# Patient Record
Sex: FEMALE | Race: WHITE | NOT HISPANIC OR LATINO | Employment: FULL TIME | URBAN - METROPOLITAN AREA
[De-identification: names, ages, dates, MRNs, and addresses within clinical notes are randomized per-mention and may not be internally consistent; named-entity substitution may affect disease eponyms.]

---

## 2019-07-08 ENCOUNTER — HOSPITAL ENCOUNTER (OUTPATIENT)
Dept: URGENT CARE | Facility: CLINIC | Age: 39
Discharge: HOME | End: 2019-07-08

## 2021-09-07 ENCOUNTER — LAB (OUTPATIENT)
Dept: LAB | Facility: HOSPITAL | Age: 41
End: 2021-09-07

## 2021-09-07 ENCOUNTER — TRANSCRIBE ORDERS (OUTPATIENT)
Dept: LAB | Facility: HOSPITAL | Age: 41
End: 2021-09-07

## 2021-09-07 DIAGNOSIS — Z00.00 ROUTINE GENERAL MEDICAL EXAMINATION AT A HEALTH CARE FACILITY: Primary | ICD-10-CM

## 2021-09-07 DIAGNOSIS — Z00.00 ROUTINE GENERAL MEDICAL EXAMINATION AT A HEALTH CARE FACILITY: ICD-10-CM

## 2021-09-07 PROCEDURE — U0004 COV-19 TEST NON-CDC HGH THRU: HCPCS

## 2021-09-07 PROCEDURE — C9803 HOPD COVID-19 SPEC COLLECT: HCPCS

## 2021-09-08 LAB — SARS-COV-2 RNA NOSE QL NAA+PROBE: DETECTED

## 2021-09-08 NOTE — PROGRESS NOTES
Called patient, verified name and date of birth, positive COVID results provided, discussed CDC guidelines, the Health Department will be contacting him/her with further instructions, patient verbalized understanding.She has been testing positive since 8/25 and 8/26. She has been congested for a few weeks. She believes she has been sick for over a few weeks when her mother tested positive from 8/20.

## 2022-09-01 ENCOUNTER — APPOINTMENT (OUTPATIENT)
Dept: ULTRASOUND IMAGING | Facility: HOSPITAL | Age: 42
End: 2022-09-01

## 2022-09-01 ENCOUNTER — HOSPITAL ENCOUNTER (EMERGENCY)
Facility: HOSPITAL | Age: 42
Discharge: HOME | End: 2022-09-01
Attending: EMERGENCY MEDICINE | Admitting: EMERGENCY MEDICINE

## 2022-09-01 VITALS
DIASTOLIC BLOOD PRESSURE: 86 MMHG | HEIGHT: 62 IN | WEIGHT: 128.53 LBS | HEART RATE: 91 BPM | BODY MASS INDEX: 23.65 KG/M2 | RESPIRATION RATE: 20 BRPM | OXYGEN SATURATION: 99 % | TEMPERATURE: 98.4 F | SYSTOLIC BLOOD PRESSURE: 138 MMHG

## 2022-09-01 DIAGNOSIS — R10.30 LOWER ABDOMINAL PAIN: Primary | ICD-10-CM

## 2022-09-01 DIAGNOSIS — D25.9 UTERINE LEIOMYOMA, UNSPECIFIED LOCATION: ICD-10-CM

## 2022-09-01 LAB
ALBUMIN SERPL-MCNC: 4.7 G/DL (ref 3.5–5.2)
ALBUMIN/GLOB SERPL: 2.1 G/DL
ALP SERPL-CCNC: 46 U/L (ref 39–117)
ALT SERPL W P-5'-P-CCNC: 14 U/L (ref 1–33)
ANION GAP SERPL CALCULATED.3IONS-SCNC: 12.6 MMOL/L (ref 5–15)
AST SERPL-CCNC: 16 U/L (ref 1–32)
BASOPHILS # BLD AUTO: 0.03 10*3/MM3 (ref 0–0.2)
BASOPHILS NFR BLD AUTO: 0.4 % (ref 0–1.5)
BILIRUB SERPL-MCNC: 0.5 MG/DL (ref 0–1.2)
BUN SERPL-MCNC: 10 MG/DL (ref 6–20)
BUN/CREAT SERPL: 15.9 (ref 7–25)
CALCIUM SPEC-SCNC: 9.1 MG/DL (ref 8.6–10.5)
CHLORIDE SERPL-SCNC: 104 MMOL/L (ref 98–107)
CO2 SERPL-SCNC: 23.4 MMOL/L (ref 22–29)
CREAT SERPL-MCNC: 0.63 MG/DL (ref 0.57–1)
DEPRECATED RDW RBC AUTO: 46.8 FL (ref 37–54)
EGFRCR SERPLBLD CKD-EPI 2021: 115.2 ML/MIN/1.73
EOSINOPHIL # BLD AUTO: 0.21 10*3/MM3 (ref 0–0.4)
EOSINOPHIL NFR BLD AUTO: 3.1 % (ref 0.3–6.2)
ERYTHROCYTE [DISTWIDTH] IN BLOOD BY AUTOMATED COUNT: 13.1 % (ref 12.3–15.4)
GLOBULIN UR ELPH-MCNC: 2.2 GM/DL
GLUCOSE SERPL-MCNC: 120 MG/DL (ref 65–99)
HCG INTACT+B SERPL-ACNC: <0.5 MIU/ML
HCT VFR BLD AUTO: 39.5 % (ref 34–46.6)
HGB BLD-MCNC: 13.8 G/DL (ref 12–15.9)
HOLD SPECIMEN: NORMAL
HOLD SPECIMEN: NORMAL
IMM GRANULOCYTES # BLD AUTO: 0.02 10*3/MM3 (ref 0–0.05)
IMM GRANULOCYTES NFR BLD AUTO: 0.3 % (ref 0–0.5)
LIPASE SERPL-CCNC: 45 U/L (ref 13–60)
LYMPHOCYTES # BLD AUTO: 2.34 10*3/MM3 (ref 0.7–3.1)
LYMPHOCYTES NFR BLD AUTO: 34.2 % (ref 19.6–45.3)
MCH RBC QN AUTO: 33.8 PG (ref 26.6–33)
MCHC RBC AUTO-ENTMCNC: 34.9 G/DL (ref 31.5–35.7)
MCV RBC AUTO: 96.8 FL (ref 79–97)
MONOCYTES # BLD AUTO: 0.42 10*3/MM3 (ref 0.1–0.9)
MONOCYTES NFR BLD AUTO: 6.1 % (ref 5–12)
NEUTROPHILS NFR BLD AUTO: 3.82 10*3/MM3 (ref 1.7–7)
NEUTROPHILS NFR BLD AUTO: 55.9 % (ref 42.7–76)
NRBC BLD AUTO-RTO: 0 /100 WBC (ref 0–0.2)
PLATELET # BLD AUTO: 183 10*3/MM3 (ref 140–450)
PMV BLD AUTO: 9.2 FL (ref 6–12)
POTASSIUM SERPL-SCNC: 3.6 MMOL/L (ref 3.5–5.2)
PROT SERPL-MCNC: 6.9 G/DL (ref 6–8.5)
RBC # BLD AUTO: 4.08 10*6/MM3 (ref 3.77–5.28)
SODIUM SERPL-SCNC: 140 MMOL/L (ref 136–145)
WBC NRBC COR # BLD: 6.84 10*3/MM3 (ref 3.4–10.8)
WHOLE BLOOD HOLD COAG: NORMAL
WHOLE BLOOD HOLD SPECIMEN: NORMAL

## 2022-09-01 PROCEDURE — 99282 EMERGENCY DEPT VISIT SF MDM: CPT

## 2022-09-01 PROCEDURE — 85025 COMPLETE CBC W/AUTO DIFF WBC: CPT | Performed by: EMERGENCY MEDICINE

## 2022-09-01 PROCEDURE — 83690 ASSAY OF LIPASE: CPT | Performed by: EMERGENCY MEDICINE

## 2022-09-01 PROCEDURE — 76830 TRANSVAGINAL US NON-OB: CPT

## 2022-09-01 PROCEDURE — 36415 COLL VENOUS BLD VENIPUNCTURE: CPT | Performed by: EMERGENCY MEDICINE

## 2022-09-01 PROCEDURE — 99283 EMERGENCY DEPT VISIT LOW MDM: CPT

## 2022-09-01 PROCEDURE — 84702 CHORIONIC GONADOTROPIN TEST: CPT | Performed by: EMERGENCY MEDICINE

## 2022-09-01 PROCEDURE — 80053 COMPREHEN METABOLIC PANEL: CPT | Performed by: EMERGENCY MEDICINE

## 2022-09-01 RX ORDER — SODIUM CHLORIDE 0.9 % (FLUSH) 0.9 %
10 SYRINGE (ML) INJECTION AS NEEDED
Status: DISCONTINUED | OUTPATIENT
Start: 2022-09-01 | End: 2022-09-01 | Stop reason: HOSPADM

## 2022-09-01 NOTE — ED PROVIDER NOTES
Time: 4:23 PM EDT  Arrived by: private car  Chief Complaint: lower abdomen/pelvic pain  History provided by: patient  History is limited by: N/A     History of Present Illness:  Patient is a 42 y.o. year old female who presents to the emergency department with lower abdominal/suprapubic pain x 3 days. She reports pain in the same area over the past few months with irregular periods. She feels like there is something going on with her female anatomy. Denies vaginal discharge or concerns for STD's. She reports negative pregnancy tests and one faint positive test recently. She reports in May she felt like she has a miscarriage because she passed a large clot.           History provided by:  Patient  Abdominal Pain  Pain location:  Suprapubic  Pain quality: sharp    Pain radiates to:  Does not radiate  Pain severity:  Moderate  Onset quality:  Sudden  Duration:  3 days  Timing:  Constant  Progression:  Worsening  Chronicity:  Recurrent  Context: not alcohol use, not awakening from sleep, not diet changes, not eating, not laxative use, not medication withdrawal, not previous surgeries, not recent illness, not recent sexual activity, not recent travel, not retching, not sick contacts, not suspicious food intake and not trauma    Relieved by:  Nothing  Worsened by:  Nothing  Ineffective treatments:  None tried  Associated symptoms: no anorexia, no belching, no chest pain, no chills, no constipation, no cough, no diarrhea, no dysuria, no fatigue, no fever, no flatus, no hematemesis, no hematochezia, no hematuria, no melena, no nausea, no shortness of breath, no sore throat, no vaginal bleeding, no vaginal discharge and no vomiting        Similar Symptoms Previously: yes  Recently seen: no      Patient Care Team  Primary Care Provider: Provider, No Known    Past Medical History:     No Known Allergies  No past medical history on file.  No past surgical history on file.  No family history on file.    Home Medications:  Prior  "to Admission medications    Medication Sig Start Date End Date Taking? Authorizing Provider   diclofenac (VOLTAREN) 50 MG EC tablet Take 1 tablet by mouth 3 (Three) Times a Day. 3/17/22   Sonu Frye PA        Social History:   Social History     Tobacco Use   • Smoking status: Current Every Day Smoker     Packs/day: 0.50   • Smokeless tobacco: Never Used   Vaping Use   • Vaping Use: Some days   Substance Use Topics   • Alcohol use: Yes     Alcohol/week: 1.0 standard drink     Types: 1 Shots of liquor per week     Comment: daily whiskey     Recent travel: no     Review of Systems:  Review of Systems   Constitutional: Negative for chills, fatigue and fever.   HENT: Negative for congestion, ear pain and sore throat.    Eyes: Negative for pain.   Respiratory: Negative for cough, chest tightness and shortness of breath.    Cardiovascular: Negative for chest pain.   Gastrointestinal: Positive for abdominal pain. Negative for anorexia, constipation, diarrhea, flatus, hematemesis, hematochezia, melena, nausea and vomiting.   Genitourinary: Positive for pelvic pain. Negative for dysuria, flank pain, hematuria, vaginal bleeding and vaginal discharge.   Musculoskeletal: Negative for joint swelling.   Skin: Negative for pallor.   Neurological: Negative for seizures and headaches.   All other systems reviewed and are negative.       Physical Exam:  /86 (BP Location: Right arm, Patient Position: Sitting)   Pulse 91   Temp 98.4 °F (36.9 °C) (Oral)   Resp 20   Ht 157.5 cm (62\")   Wt 58.3 kg (128 lb 8.5 oz)   LMP 06/12/2022 Comment: 2 periods in June  SpO2 99%   BMI 23.51 kg/m²     Physical Exam  Vitals and nursing note reviewed.   Constitutional:       General: She is not in acute distress.     Appearance: Normal appearance. She is not toxic-appearing.   HENT:      Head: Normocephalic and atraumatic.      Mouth/Throat:      Mouth: Mucous membranes are moist.   Eyes:      General: No scleral " icterus.  Cardiovascular:      Rate and Rhythm: Normal rate and regular rhythm.      Pulses: Normal pulses.      Heart sounds: Normal heart sounds.   Pulmonary:      Effort: Pulmonary effort is normal. No respiratory distress.      Breath sounds: Normal breath sounds.   Abdominal:      General: Abdomen is flat.      Palpations: Abdomen is soft.      Tenderness: There is no abdominal tenderness.   Musculoskeletal:         General: Normal range of motion.      Cervical back: Normal range of motion and neck supple.   Skin:     General: Skin is warm and dry.   Neurological:      Mental Status: She is alert and oriented to person, place, and time. Mental status is at baseline.                Medications in the Emergency Department:  Medications - No data to display     Labs  Lab Results (last 24 hours)     Procedure Component Value Units Date/Time    CBC & Differential [[id number]]  (Abnormal) Collected: 09/01/22 1634    Specimen: Blood Updated: 09/01/22 1643    Narrative:      The following orders were created for panel order CBC & Differential.  Procedure                               Abnormality         Status                     ---------                               -----------         ------                     CBC Auto Differential[[id number]]        Abnormal            Final result                 Please view results for these tests on the individual orders.    Comprehensive Metabolic Panel [[id number]]  (Abnormal) Collected: 09/01/22 1634    Specimen: Blood Updated: 09/01/22 1709     Glucose 120 mg/dL      BUN 10 mg/dL      Creatinine 0.63 mg/dL      Sodium 140 mmol/L      Potassium 3.6 mmol/L      Chloride 104 mmol/L      CO2 23.4 mmol/L      Calcium 9.1 mg/dL      Total Protein 6.9 g/dL      Albumin 4.70 g/dL      ALT (SGPT) 14 U/L      AST (SGOT) 16 U/L      Alkaline Phosphatase 46 U/L      Total Bilirubin 0.5 mg/dL      Globulin 2.2 gm/dL      A/G Ratio 2.1 g/dL      BUN/Creatinine Ratio 15.9     Anion Gap  12.6 mmol/L      eGFR 115.2 mL/min/1.73      Comment: National Kidney Foundation and American Society of Nephrology (ASN) Task Force recommended calculation based on the Chronic Kidney Disease Epidemiology Collaboration (CKD-EPI) equation refit without adjustment for race.       Narrative:      GFR Normal >60  Chronic Kidney Disease <60  Kidney Failure <15      Lipase [[id number]]  (Normal) Collected: 09/01/22 1634    Specimen: Blood Updated: 09/01/22 1709     Lipase 45 U/L     hCG, Quantitative, Pregnancy [[id number]] Collected: 09/01/22 1634    Specimen: Blood Updated: 09/01/22 1706     HCG Quantitative <0.50 mIU/mL     Narrative:      HCG Ranges by Gestational Age    Females - non-pregnant premenopausal   </= 1mIU/mL HCG  Females - postmenopausal               </= 7mIU/mL HCG    3 Weeks       5.4   -      72 mIU/mL  4 Weeks      10.2   -     708 mIU/mL  5 Weeks       217   -   8,245 mIU/mL  6 Weeks       152   -  32,177 mIU/mL  7 Weeks     4,059   - 153,767 mIU/mL  8 Weeks    31,366   - 149,094 mIU/mL  9 Weeks    59,109   - 135,901 mIU/mL  10 Weeks   44,186   - 170,409 mIU/mL  12 Weeks   27,107   - 201,615 mIU/mL  14 Weeks   24,302   -  93,646 mIU/mL  15 Weeks   12,540   -  69,747 mIU/mL  16 Weeks    8,904   -  55,332 mIU/mL  17 Weeks    8,240   -  51,793 mIU/mL  18 Weeks    9,649   -  55,271 mIU/mL    Results may be falsely decreased if patient taking Biotin.      CBC Auto Differential [[id number]]  (Abnormal) Collected: 09/01/22 1634    Specimen: Blood Updated: 09/01/22 1643     WBC 6.84 10*3/mm3      RBC 4.08 10*6/mm3      Hemoglobin 13.8 g/dL      Hematocrit 39.5 %      MCV 96.8 fL      MCH 33.8 pg      MCHC 34.9 g/dL      RDW 13.1 %      RDW-SD 46.8 fl      MPV 9.2 fL      Platelets 183 10*3/mm3      Neutrophil % 55.9 %      Lymphocyte % 34.2 %      Monocyte % 6.1 %      Eosinophil % 3.1 %      Basophil % 0.4 %      Immature Grans % 0.3 %      Neutrophils, Absolute 3.82 10*3/mm3      Lymphocytes, Absolute 2.34  10*3/mm3      Monocytes, Absolute 0.42 10*3/mm3      Eosinophils, Absolute 0.21 10*3/mm3      Basophils, Absolute 0.03 10*3/mm3      Immature Grans, Absolute 0.02 10*3/mm3      nRBC 0.0 /100 WBC            Imaging:  US Non-ob Transvaginal    Result Date: 9/1/2022  PROCEDURE: US NON-OB TRANSVAGINAL  COMPARISON: None  INDICATIONS: lower abd pain/pelic pain  TECHNIQUE: Ultrasound examination of the pelvis was performed, using endovaginal technique.   FINDINGS:  The visualized uterus appears markedly heterogeneous.  The uterus is estimated to measure 13.5 cm x 6.0 cm x 7.7 cm.  Foci of heterogeneity within the uterus measure up to 4.3 cm.  The endometrial stripe is not demonstrated on the provided images.  The ovaries are not visualized.        1. Diffuse enlargement and marked heterogeneity of the uterus suspected to be secondary to multiple fibroids.  There is associated shadowing by the lesions.  Other superimposed pathology is not excluded.  Comparison with any previous pelvic ultrasound would be useful.  Follow-up pelvic MRI on a nonemergent basis may be useful to further evaluate. 2. Nonvisualization of the ovaries.     Isai Zelaya M.D.       Electronically Signed and Approved By: Isai Zelaya M.D. on 9/01/2022 at 18:54               Procedures:  Procedures    Progress  ED Course as of 09/01/22 2228   Thu Sep 01, 2022   1623   --- PROVIDER IN TRIAGE NOTE ---    Patient was evaluated in triage by Nathaniel shafer.  Orders were written and the patient is currently awaiting disposition.  [CM]      ED Course User Index  [CM] Trace Bernal APRN                            The patient was initially evaluated in the triage area where orders were placed. The patient was later dispositioned by HEAVENLY Rodriguez.      Medical Decision Making:  MDM  Number of Diagnoses or Management Options  Lower abdominal pain: new and requires workup  Uterine leiomyoma, unspecified location: new and requires  workup  Diagnosis management comments: The patient is resting comfortably and feels better, is alert and in no distress. Repeat examination is unremarkable and benign; in particular, there's no discomfort at McBurney's point and there is no pulsatile mass. The history, exam, diagnostic testing, and current condition does not suggest acute appendicitis, bowel obstruction, acute cholecystitis, bowel perforation, major gastrointestinal bleeding, severe diverticulitis, abdominal aortic aneurysm, mesenteric ischemia, volvulus, sepsis, or other significant pathology that warrants further testing, continued ED treatment, admission, for surgical evaluation at this point. The vital signs have been stable. The patient does not have uncontrollable pain, intractable vomiting, or other significant symptoms. The patient's condition is stable and appropriate for discharge from the emergency department.       Amount and/or Complexity of Data Reviewed  Clinical lab tests: reviewed  Tests in the radiology section of CPT®: reviewed    Risk of Complications, Morbidity, and/or Mortality  Presenting problems: moderate  Diagnostic procedures: low  Management options: low    Patient Progress  Patient progress: stable       Final diagnoses:   Lower abdominal pain   Uterine leiomyoma, unspecified location        Disposition:  ED Disposition     ED Disposition   Discharge    Condition   Stable    Comment   --             This medical record created using voice recognition software.           Trace Bernal, APRN  09/01/22 5909

## 2022-09-02 NOTE — DISCHARGE INSTRUCTIONS
Follow up with your primary provider or with an OB/GYN for evaluation and to have someone order pelvic MRI as suggested by radiologist tonight based on your ultrasound result. Return to the ER for worsening pain or any concerns.

## 2023-12-13 ENCOUNTER — OFFICE VISIT (OUTPATIENT)
Dept: OBSTETRICS AND GYNECOLOGY | Facility: CLINIC | Age: 43
End: 2023-12-13
Payer: COMMERCIAL

## 2023-12-13 ENCOUNTER — PATIENT ROUNDING (BHMG ONLY) (OUTPATIENT)
Dept: OBSTETRICS AND GYNECOLOGY | Facility: CLINIC | Age: 43
End: 2023-12-13
Payer: COMMERCIAL

## 2023-12-13 VITALS
HEART RATE: 84 BPM | HEIGHT: 62 IN | SYSTOLIC BLOOD PRESSURE: 132 MMHG | BODY MASS INDEX: 29.19 KG/M2 | DIASTOLIC BLOOD PRESSURE: 92 MMHG | WEIGHT: 158.6 LBS

## 2023-12-13 DIAGNOSIS — N91.2 AMENORRHEA: Primary | ICD-10-CM

## 2023-12-13 DIAGNOSIS — R93.89 THICKENED ENDOMETRIUM: ICD-10-CM

## 2023-12-13 DIAGNOSIS — N85.2 BULKY OR ENLARGED UTERUS: ICD-10-CM

## 2023-12-13 LAB
HCG INTACT+B SERPL-ACNC: <1 MIU/ML
PROLACTIN SERPL-MCNC: 20.1 NG/ML (ref 4.79–23.3)
T4 FREE SERPL-MCNC: 1.02 NG/DL (ref 0.93–1.7)
TSH SERPL DL<=0.05 MIU/L-ACNC: 3.69 UIU/ML (ref 0.27–4.2)

## 2023-12-13 PROCEDURE — 84439 ASSAY OF FREE THYROXINE: CPT | Performed by: OBSTETRICS & GYNECOLOGY

## 2023-12-13 PROCEDURE — 84702 CHORIONIC GONADOTROPIN TEST: CPT | Performed by: OBSTETRICS & GYNECOLOGY

## 2023-12-13 PROCEDURE — 84146 ASSAY OF PROLACTIN: CPT | Performed by: OBSTETRICS & GYNECOLOGY

## 2023-12-13 PROCEDURE — 84443 ASSAY THYROID STIM HORMONE: CPT | Performed by: OBSTETRICS & GYNECOLOGY

## 2023-12-13 RX ORDER — PRENATAL VIT NO.126/IRON/FOLIC 28MG-0.8MG
TABLET ORAL DAILY
COMMUNITY

## 2023-12-13 NOTE — PATIENT INSTRUCTIONS
Venipuncture Blood Specimen Collection  Venipuncture performed in right arm by Mary Garibay with good hemostasis. Patient tolerated the procedure well without complications.   12/13/23   Mary Garibay

## 2023-12-13 NOTE — PROGRESS NOTES
"GYN Visit    Chief Complaint   Patient presents with    Follow-up     FUUS w/ findings of enlarged uterus and right ovarian mass.       HPI:   43 y.o. LMP: Patient's last menstrual period was 2023 (within weeks). Presents for referral for enlarged, fibroid uterus and amenorrhea. Patient states she has not had a period since May. She thought she was pregnant and states she had a faint pregnancy test but the lab draws have all been negative. She had an US in November that showed a thickened endometrium with a possible right complex adnexal mass but no measurements were done. She did have an US in 2022 which also showed a thickened area of the endometrium and an enlarged uterus. She was counseled that these are most likely fibroids that have continued to enlarge. She states she has gained weight and feels bloated. She was counseled that due to the amenorrhea I recommend TSH, prolactin, b-hcg and to repeat the US with a possible EMB. She denies any complaints today and states she is up to date with her pap smear from the health department. We will obtain records.       History: PMHx, Meds, Allergies, PSHx, Social Hx, and POBHx all reviewed and updated.    PHYSICAL EXAM:  /92   Pulse 84   Ht 157.5 cm (62\")   Wt 71.9 kg (158 lb 9.6 oz)   LMP 2023 (Within Weeks) Comment: Pt has not had a period since May  BMI 29.01 kg/m²   General- NAD, alert and oriented, appropriate  Psych- Normal mood, good memory  Neck- No masses, no thyroid enlargement  Cardiovascular- Regular rhythm, no murnurs  Respiratory- CTA to bases, no wheezes  Abdomen- Soft, non distended, non tender, no masses  Pelvic exam: defer to US and EMB visit     ASSESSMENT AND PLAN:  Diagnoses and all orders for this visit:    1. Amenorrhea (Primary)  -     US Non-ob Transvaginal; Future  -     TSH; Future  -     T4, free; Future  -     Prolactin; Future  -     hCG, Quantitative, Pregnancy  -     TSH  -     T4, free  -     " Prolactin    2. Thickened endometrium    3. Bulky or enlarged uterus          Follow Up:  Return in about 2 weeks (around 12/27/2023) for TVUS and EMB.    I spent 20 minutes caring for Frances on this date of service. This time includes time spent by me in the following activities:preparing for the visit, reviewing tests, obtaining and/or reviewing a separately obtained history, performing a medically appropriate examination and/or evaluation , counseling and educating the patient/family/caregiver, ordering medications, tests, or procedures, and documenting information in the medical record    Afshan Magana DO  12/13/2023    [facility]  [address] [place] [address]  Dept: [phone]  Dept Fax: [phone]  Loc: [phone]  Loc Fax: [phone]

## 2023-12-20 ENCOUNTER — OFFICE VISIT (OUTPATIENT)
Dept: INTERNAL MEDICINE | Facility: CLINIC | Age: 43
End: 2023-12-20
Payer: COMMERCIAL

## 2023-12-20 VITALS
SYSTOLIC BLOOD PRESSURE: 130 MMHG | BODY MASS INDEX: 29.59 KG/M2 | OXYGEN SATURATION: 98 % | TEMPERATURE: 97.7 F | HEART RATE: 79 BPM | DIASTOLIC BLOOD PRESSURE: 78 MMHG | WEIGHT: 161.8 LBS

## 2023-12-20 DIAGNOSIS — F17.210 CIGARETTE NICOTINE DEPENDENCE WITHOUT COMPLICATION: ICD-10-CM

## 2023-12-20 DIAGNOSIS — R03.0 ELEVATED BLOOD PRESSURE READING WITHOUT DIAGNOSIS OF HYPERTENSION: ICD-10-CM

## 2023-12-20 DIAGNOSIS — Z12.31 ENCOUNTER FOR SCREENING MAMMOGRAM FOR BREAST CANCER: ICD-10-CM

## 2023-12-20 DIAGNOSIS — N92.6 IRREGULAR PERIODS: ICD-10-CM

## 2023-12-20 DIAGNOSIS — Z00.00 ANNUAL PHYSICAL EXAM: Primary | ICD-10-CM

## 2023-12-20 NOTE — ASSESSMENT & PLAN NOTE
Discussed bp elevation at today's visit. Discussed cutting down/quitting mt dew intake and nicotine use. Not high enough for medication at this time. Discussed risks of blood pressure elevation including death, heart attack, stroke, kidney disease, blindness. Low salt diet, increase exercise. We will monitor at follow up and discuss blood pressure medications if necessary. To er if chest pain, palpitations, vision loss, unilateral weakness, altered mental status. Pt understands and agrees with plan.

## 2023-12-20 NOTE — ASSESSMENT & PLAN NOTE
Reviewed preventative medication recommendations that are age appropriate for the patient. Education provided for health and wellness. Encouraged healthy diet, regular exercise, and routine wellness checkups.  Declines vaccines.

## 2023-12-20 NOTE — ASSESSMENT & PLAN NOTE
Discussed risks of smoking with patient including death, blood pressure elevation, heart attack, stroke, kidney disese, blindness, slow wound healing. Pt is not ready to quit smoking at this time, encouraged to RTC once ready to quit.

## 2023-12-20 NOTE — PROGRESS NOTES
Chief Complaint  Establish Care (Possible pregnancy. Thinks it might be menopause.  /)    Subjective          Frances Rao presents to [facility]  History of Present Illness  Last PCP: [facility]  Previous Specialists: GYN   Last labs: 12/13/23  Last mammogram: never, family history of breast cancer in great grandparents  Last pap: at health dept in 11/2023  Last colonoscopy: never,  no family history of colon cancer     Irregular periods:   No period since 5/2023.   Has seen gyn for this. Labs 12/13/23  She had period which started 12/18/23.     BP elevation: it has been high in the past  Denies chest pain, palpitations, dizziness   Pt drinks 5 cans of Mt Dew /day   Smoking use       Past Medical History:   Diagnosis Date    Abnormal Pap smear of cervix     Fibroid     Ovarian cyst         History reviewed. No pertinent surgical history.     Current Outpatient Medications on File Prior to Visit   Medication Sig Dispense Refill    prenatal vitamin (prenatal, CLASSIC, vitamin) tablet Take  by mouth Daily.       No current facility-administered medications on file prior to visit.        No Known Allergies    Social History     Tobacco Use   Smoking Status Every Day    Packs/day: 0.50    Years: 22.00    Additional pack years: 0.00    Total pack years: 11.00    Types: Cigarettes   Smokeless Tobacco Never   Tobacco Comments    Trying to stop          Objective   Vital Signs:   /78   Pulse 79   Temp 97.7 °F (36.5 °C) (Temporal)   Wt 73.4 kg (161 lb 12.8 oz)   SpO2 98%   BMI 29.59 kg/m²     Physical Exam  Vitals reviewed.   Constitutional:       Appearance: Normal appearance.   HENT:      Head: Normocephalic and atraumatic.      Nose: Nose normal.      Mouth/Throat:      Mouth: Mucous membranes are moist.   Eyes:      Extraocular Movements: Extraocular movements intact.      Conjunctiva/sclera: Conjunctivae normal.      Pupils: Pupils are  equal, round, and reactive to light.   Cardiovascular:      Rate and Rhythm: Normal rate and regular rhythm.   Pulmonary:      Effort: Pulmonary effort is normal.      Breath sounds: Normal breath sounds.   Abdominal:      General: Abdomen is flat. Bowel sounds are normal.      Palpations: Abdomen is soft.   Musculoskeletal:         General: Normal range of motion.   Neurological:      General: No focal deficit present.      Mental Status: She is alert and oriented to person, place, and time.   Psychiatric:         Mood and Affect: Mood normal.        Result Review :   The following data was reviewed by: Maria Guadalupe Francisco PA-C on 12/20/2023:    Data reviewed : Radiologic studies TVUS                    Assessment and Plan    Diagnoses and all orders for this visit:    1. Annual physical exam (Primary)  Assessment & Plan:  Reviewed preventative medication recommendations that are age appropriate for the patient. Education provided for health and wellness. Encouraged healthy diet, regular exercise, and routine wellness checkups.  Declines vaccines.      2. Elevated blood pressure reading without diagnosis of hypertension  Assessment & Plan:  Discussed bp elevation at today's visit. Discussed cutting down/quitting mt dew intake and nicotine use. Not high enough for medication at this time. Discussed risks of blood pressure elevation including death, heart attack, stroke, kidney disease, blindness. Low salt diet, increase exercise. We will monitor at follow up and discuss blood pressure medications if necessary. To er if chest pain, palpitations, vision loss, unilateral weakness, altered mental status. Pt understands and agrees with plan.        3. Cigarette nicotine dependence without complication  Assessment & Plan:  Discussed risks of smoking with patient including death, blood pressure elevation, heart attack, stroke, kidney disese, blindness, slow wound healing. Pt is not ready to quit smoking at this time,  encouraged to RTC once ready to quit.      4. Encounter for screening mammogram for breast cancer  -     Mammo Screening Digital Tomosynthesis Bilateral With CAD; Future    5. Irregular periods  Comments:  reviewed GYN note and US. Discussed importance of f/u. Pt will keep appt on 12/28.        Follow Up   Return in about 6 weeks (around 1/31/2024).  Patient was given instructions and counseling regarding her condition or for health maintenance advice. Please see specific information pulled into the AVS if appropriate.

## 2023-12-21 ENCOUNTER — PATIENT ROUNDING (BHMG ONLY) (OUTPATIENT)
Dept: INTERNAL MEDICINE | Facility: CLINIC | Age: 43
End: 2023-12-21
Payer: COMMERCIAL

## 2023-12-21 NOTE — PROGRESS NOTES
A My-Chart message has been sent to the patient for PATIENT ROUNDING with [facility].

## 2023-12-28 ENCOUNTER — PROCEDURE VISIT (OUTPATIENT)
Dept: OBSTETRICS AND GYNECOLOGY | Facility: CLINIC | Age: 43
End: 2023-12-28
Payer: COMMERCIAL

## 2023-12-28 VITALS
HEIGHT: 62 IN | WEIGHT: 161 LBS | DIASTOLIC BLOOD PRESSURE: 85 MMHG | BODY MASS INDEX: 29.63 KG/M2 | SYSTOLIC BLOOD PRESSURE: 121 MMHG | HEART RATE: 73 BPM

## 2023-12-28 DIAGNOSIS — N85.2 BULKY OR ENLARGED UTERUS: ICD-10-CM

## 2023-12-28 DIAGNOSIS — R93.89 THICKENED ENDOMETRIUM: Primary | ICD-10-CM

## 2023-12-28 NOTE — PROGRESS NOTES
US only visit. See report for details.     Electronically signed by:    Afshan Magana DO  12/28/23  16:17 EST

## 2024-01-26 ENCOUNTER — PROCEDURE VISIT (OUTPATIENT)
Dept: OBSTETRICS AND GYNECOLOGY | Facility: CLINIC | Age: 44
End: 2024-01-26
Payer: COMMERCIAL

## 2024-01-26 ENCOUNTER — PREP FOR SURGERY (OUTPATIENT)
Dept: OTHER | Facility: HOSPITAL | Age: 44
End: 2024-01-26
Payer: COMMERCIAL

## 2024-01-26 VITALS
SYSTOLIC BLOOD PRESSURE: 124 MMHG | DIASTOLIC BLOOD PRESSURE: 84 MMHG | WEIGHT: 155.2 LBS | BODY MASS INDEX: 28.56 KG/M2 | HEIGHT: 62 IN

## 2024-01-26 DIAGNOSIS — N85.2 BULKY OR ENLARGED UTERUS: ICD-10-CM

## 2024-01-26 DIAGNOSIS — R93.89 THICKENED ENDOMETRIUM: Primary | ICD-10-CM

## 2024-01-26 LAB
B-HCG UR QL: NEGATIVE
EXPIRATION DATE: NORMAL
INTERNAL NEGATIVE CONTROL: NORMAL
INTERNAL POSITIVE CONTROL: NORMAL
Lab: NORMAL

## 2024-01-26 RX ORDER — SODIUM CHLORIDE 9 MG/ML
40 INJECTION, SOLUTION INTRAVENOUS AS NEEDED
OUTPATIENT
Start: 2024-01-26

## 2024-01-26 RX ORDER — SODIUM CHLORIDE 0.9 % (FLUSH) 0.9 %
3 SYRINGE (ML) INJECTION EVERY 12 HOURS SCHEDULED
OUTPATIENT
Start: 2024-01-26

## 2024-01-26 RX ORDER — SODIUM CHLORIDE 0.9 % (FLUSH) 0.9 %
10 SYRINGE (ML) INJECTION AS NEEDED
OUTPATIENT
Start: 2024-01-26

## 2024-01-26 NOTE — PROGRESS NOTES
"Endometrial Biopsy Procedure Note      CC:  Pt presents for Endometrial Bx  Chief Complaint   Patient presents with    EMBX       Birth control: NA  cg:  Negative  Consent signed: Yes    Procedure reviewed in detail.  She understands the potential risks include, but are not limited to, pain, bleeding, uterine perforation and infection.  Her questions have been answered.      Subjective/HPI:  Patient with thickened endometrium on US.     Objective:  /84   Ht 157.5 cm (62\")   Wt 70.4 kg (155 lb 3.2 oz)   LMP 01/18/2024 (Within Weeks) Comment: Irregular menses  BMI 28.39 kg/m²   External genitalia- Without lesion   Vulva/Vagina/Perineum- Without lesion  Cvx- Without lesion  Procedure:   The patient was placed in the lithotomy position on the exam table. Bimanual exam was performed. The uterus was found to be anteverted. A sterile speculum was placed in the vagina and the cervix was visualized. The cervix was cleansed with Betadine. The cervix was grasped with a tenaculum. The cervix was attempted to be dilated but cervical stenosis prevented this. Unable to obtain EMB, we will plan for Hysteroscopy D&C.    The tenaculum was removed from the cervix , hemostasis was affirmed on the cervix.    The speculum was removed and the patient was given post procedural instructions. Patient tolerated the procedure well.    Assessment and Plan:  Diagnoses and all orders for this visit:    1. Thickened endometrium (Primary)  -     POC Pregnancy, Urine  -     Cancel: Tissue Pathology Exam    2. Bulky or enlarged uterus    -Plan for hysteroscopy D&C      Counseling:  PRECAUTIONS - It is common to have bright red spotting and/or bleeding.  She should not be bleeding heavily.  She can use OTC pain relievers prn.  She needs to return to office or ER (if after hours/weekends) if she has pelvic pain, bleeding > 1 pad/2 hours, discharge that has a bad vaginal odor or vaginal itching, fever > 101.5, or any other concerns.    Pt to " call office if hasn't received results and recommended next steps in the next 7-10days.    TRACK MENSES, RTO if <q21 days (frequent) or >q3mo (infrequent IF not on hormonal BC), >7d long, heavy, or painful.      I spent 20 minutes on the separately reported service of Failed EMB. This time is not included in the time used to support the E/M service also reported today.      Follow Up:  Return in about 2 weeks (around 2/9/2024) for Post op visit.        Afshan Magana DO  01/26/2024    [facility]  [address] [place] [address]  Dept: [phone]  Dept Fax: [phone]  Loc: [phone]  Loc Fax: [phone]

## 2024-01-29 ENCOUNTER — TELEPHONE (OUTPATIENT)
Dept: OBSTETRICS AND GYNECOLOGY | Facility: CLINIC | Age: 44
End: 2024-01-29
Payer: COMMERCIAL

## 2024-01-29 NOTE — TELEPHONE ENCOUNTER
Patient called and advised no notes in account that we called her today. She was advised that it may of been PAT and told to call them back.

## 2024-01-31 ENCOUNTER — OFFICE VISIT (OUTPATIENT)
Dept: INTERNAL MEDICINE | Facility: CLINIC | Age: 44
End: 2024-01-31
Payer: COMMERCIAL

## 2024-01-31 ENCOUNTER — ANESTHESIA EVENT (OUTPATIENT)
Dept: PERIOP | Facility: HOSPITAL | Age: 44
End: 2024-01-31
Payer: COMMERCIAL

## 2024-01-31 VITALS
RESPIRATION RATE: 15 BRPM | HEIGHT: 62 IN | OXYGEN SATURATION: 98 % | SYSTOLIC BLOOD PRESSURE: 130 MMHG | WEIGHT: 157.38 LBS | BODY MASS INDEX: 28.96 KG/M2 | HEART RATE: 75 BPM | TEMPERATURE: 97.8 F | DIASTOLIC BLOOD PRESSURE: 84 MMHG

## 2024-01-31 DIAGNOSIS — R03.0 ELEVATED BLOOD PRESSURE READING WITHOUT DIAGNOSIS OF HYPERTENSION: Primary | ICD-10-CM

## 2024-01-31 PROCEDURE — 99213 OFFICE O/P EST LOW 20 MIN: CPT | Performed by: PHYSICIAN ASSISTANT

## 2024-01-31 NOTE — PROGRESS NOTES
Chief Complaint  Elevated Blood Pressure and Menstrual Problem    Subjective          Frances Rao presents to [facility]  History of Present Illness  Pt here for follow up on bp elevation  Still drinking mt dew but has cut back   Scheduled for d&c tomorrow with gyn   States she is worrying about upcoming procedure.  Denies chest pain, palpitations, ha, dizziness    Past Medical History:   Diagnosis Date    Abnormal Pap smear of cervix     Anxiety     Fibroid     Migraine     Ovarian cyst         Past Surgical History:   Procedure Laterality Date    COLPOSCOPY      WISDOM TOOTH EXTRACTION          Current Facility-Administered Medications on File Prior to Visit   Medication Dose Route Frequency Provider Last Rate Last Admin    acetaminophen (TYLENOL) tablet 1,000 mg  1,000 mg Oral Once Todd Francis MD        lactated ringers infusion  9 mL/hr Intravenous Continuous PRN Todd Francis MD        Midazolam HCl (PF) (VERSED) injection 2 mg  2 mg Intravenous Once Todd Francis MD        sodium chloride 0.9 % flush 10 mL  10 mL Intravenous PRN Afshan Magana, DO        sodium chloride 0.9 % flush 3 mL  3 mL Intravenous Q12H Afshan Magana, DO        sodium chloride 0.9 % infusion 40 mL  40 mL Intravenous PRN Afshan Magana, DO         Current Outpatient Medications on File Prior to Visit   Medication Sig Dispense Refill    doxylamine (UNISOM) 25 MG tablet Take 1 tablet by mouth At Night As Needed for Sleep.      prenatal vitamin (prenatal, CLASSIC, vitamin) tablet Take  by mouth Daily.          No Known Allergies    Social History     Tobacco Use   Smoking Status Every Day    Packs/day: 0.50    Years: 22.00    Additional pack years: 0.00    Total pack years: 11.00    Types: Cigarettes   Smokeless Tobacco Never   Tobacco Comments    Last 1/31/24          Objective   Vital Signs:   /84 (BP Location: Left arm, Patient Position: Sitting, Cuff Size: Adult)   Pulse 75   " Temp 97.8 °F (36.6 °C) (Temporal)   Resp 15   Ht 157.5 cm (62.01\")   Wt 71.4 kg (157 lb 6 oz)   SpO2 98%   BMI 28.77 kg/m²     Physical Exam  Vitals reviewed.   Constitutional:       Appearance: Normal appearance.   HENT:      Head: Normocephalic and atraumatic.      Nose: Nose normal.      Mouth/Throat:      Mouth: Mucous membranes are moist.   Eyes:      Extraocular Movements: Extraocular movements intact.      Conjunctiva/sclera: Conjunctivae normal.      Pupils: Pupils are equal, round, and reactive to light.   Cardiovascular:      Rate and Rhythm: Normal rate and regular rhythm.   Pulmonary:      Effort: Pulmonary effort is normal.      Breath sounds: Normal breath sounds.   Abdominal:      General: Abdomen is flat. Bowel sounds are normal.      Palpations: Abdomen is soft.   Musculoskeletal:         General: Normal range of motion.   Neurological:      General: No focal deficit present.      Mental Status: She is alert and oriented to person, place, and time.   Psychiatric:         Mood and Affect: Mood normal.        Result Review :   The following data was reviewed by: Maria Guadalupe Francisco PA-C on 01/31/2024:  Common labs          2/1/2024    07:06   Common Labs   WBC 7.54    Hemoglobin 14.8    Hematocrit 44.2    Platelets 215                           Assessment and Plan    Diagnoses and all orders for this visit:    1. Elevated blood pressure reading without diagnosis of hypertension (Primary)  Assessment & Plan:  Discussed bp elevation at today's visit. Not high enough for medication at this time. Discussed risks of blood pressure elevation including death, heart attack, stroke, kidney disease, blindness. Low salt diet, increase exercise. We will monitor at follow up and discuss blood pressure medications if necessary. To er if chest pain, palpitations, vision loss, unilateral weakness, altered mental status. Pt understands and agrees with plan.            Follow Up   Return in about 2 months (around " 3/31/2024).  Patient was given instructions and counseling regarding her condition or for health maintenance advice. Please see specific information pulled into the AVS if appropriate.

## 2024-01-31 NOTE — PRE-PROCEDURE INSTRUCTIONS
IMPORTANT INSTRUCTIONS - PRE-ADMISSION TESTING  DO NOT EAT OR CHEW anything after midnight the night before your procedure.    You may have CLEAR liquids up to ______ hours prior to ARRIVAL time. PER DR ALVA BAKER/SEAN NO RED UP TO 3 HOURS PRIOR TO ARRIVAL  Take the following medications the morning of your procedure with JUST A SIP OF WATER:  _____NONE __________________________________________________________________________________________________________________________________________________________________________________    DO NOT BRING your medications to the hospital with you, UNLESS something has changed since your PRE-Admission Testing appointment.  Hold all vitamins, supplements, and NSAIDS (Non- steroidal anti-inflammatory meds) for one week prior to surgery (you MAY take Tylenol or Acetaminophen).  If you are diabetic, check your blood sugar the morning of your procedure. If it is less than 70 or if you are feeling symptomatic, call the following number for further instructions: [phone]  Use your inhalers/nebulizers as usual, the morning of your procedure. BRING YOUR INHALERS with you.   Bring your CPAP or BIPAP to hospital, ONLY IF YOU WILL BE SPENDING THE NIGHT.   Make sure you have a ride home and have someone who will stay with you the day of your procedure after you go home.  If you have any questions, please call your Pre-Admission Testing Nurse, ___DAWMARICARMEN_____________ at [phone]__.   Per anesthesia request, do not smoke for 24 hours before your procedure or as instructed by your surgeon.    BATHING INSTRUCTIONS GIVEN. NO JEWELRY DAY OF PROCEDURE. NO NAIL POLISH UPPER OR LOWER EXTREMITIES.  ENTRANCE A, ELEVATOR A, 3RD FLOOR DAY OF PROCEDURE  NO SMOKING 24 HOURS PRIOR TO PROCEDURE

## 2024-01-31 NOTE — H&P
GYN HISTORY & PHYSICAL      Patient Name: Frances Rao  : 1980  MRN: [id number]    Subjective         HPI:  43 y.o.  Patient's last menstrual period was 2024 (approximate).. Patient here today to undergo hysteroscopy D&C due to cervical stenosis in office and inability to obtain EMB in office.   She wishes to proceed with the above procedure. She denies any F/C, D/C, N/V, CP or SOB.     Risks and benefits and alternatives of surgery were discussed with patient.  Risks are not limited to anesthesia, bleeding, blood transfusion, infection, damage to surrounding organs, wound separation, re-operation, thromboembolic disease, death.            ROS: Review of Systems   Constitutional: Negative.    HENT: Negative.     Eyes: Negative.    Respiratory: Negative.     Cardiovascular: Negative.    Gastrointestinal: Negative.    Endocrine: Negative.    Genitourinary:  Positive for pelvic pain and vaginal bleeding.   Musculoskeletal: Negative.    Skin: Negative.    Neurological: Negative.    Hematological: Negative.    Psychiatric/Behavioral: Negative.           Personal History     PMHx:    Past Medical History:   Diagnosis Date    Abnormal Pap smear of cervix     Anxiety     Fibroid     Migraine     Ovarian cyst        OBHx:   OB History    Para Term  AB Living   0 0 0 0 0 0   SAB IAB Ectopic Molar Multiple Live Births   0 0 0 0 0 0        Home Medications:  doxylamine and prenatal vitamin    Allergies:  No Known Allergies    PSHx:   Past Surgical History:   Procedure Laterality Date    COLPOSCOPY      WISDOM TOOTH EXTRACTION         Social History:    reports that she has been smoking cigarettes. She has a 11.00 pack-year smoking history. She has never used smokeless tobacco. She reports that she does not currently use alcohol. She reports current drug use. Drug: Marijuana.      Objective     Vitals:   Temp:  [97.8 °F (36.6 °C)] 97.8 °F (36.6 °C)  Heart Rate:  [75] 75  Resp:  [15]  15  BP: (130)/(84) 130/84    PHYSICAL EXAM:   General- NAD, alert and oriented, appropriate  Psych- Normal mood, good memory  CV- Regular rhythm, no murnurs  Resp- CTA to bases, no wheezes  Abdomen- NABS, soft, non distended, non tender, no masses  Pelvic- Defer to OR           Assessment / Plan     Assessment:  Enlarged fibroid uterus  AUB   Cervical stenosis     Plan:   Hysteroscopy, dilation and curettage   Patient to follow up in office in 2 weeks for post op visit. All questions and concerns have been answered.       Counseling: Risks and benefits and alternatives of surgery were discussed with patient.  Risks are not limited to anesthesia, bleeding, blood transfusion, infection, damage to surrounding organs, wound separation, re-operation, thromboembolic disease, death.  See separate written and signed consent for surgical specific risks and benefits.        Signature:   Electronically signed by:    Afshan Magana DO  02/01/24  07:01 EST

## 2024-02-01 ENCOUNTER — ANESTHESIA (OUTPATIENT)
Dept: PERIOP | Facility: HOSPITAL | Age: 44
End: 2024-02-01
Payer: COMMERCIAL

## 2024-02-01 ENCOUNTER — HOSPITAL ENCOUNTER (OUTPATIENT)
Facility: HOSPITAL | Age: 44
Setting detail: HOSPITAL OUTPATIENT SURGERY
Discharge: HOME | End: 2024-02-01
Attending: OBSTETRICS & GYNECOLOGY | Admitting: OBSTETRICS & GYNECOLOGY
Payer: COMMERCIAL

## 2024-02-01 VITALS
RESPIRATION RATE: 16 BRPM | DIASTOLIC BLOOD PRESSURE: 75 MMHG | BODY MASS INDEX: 28.76 KG/M2 | OXYGEN SATURATION: 100 % | HEIGHT: 62 IN | WEIGHT: 156.31 LBS | TEMPERATURE: 97.8 F | HEART RATE: 50 BPM | SYSTOLIC BLOOD PRESSURE: 132 MMHG

## 2024-02-01 DIAGNOSIS — R93.89 THICKENED ENDOMETRIUM: ICD-10-CM

## 2024-02-01 LAB
B-HCG UR QL: NEGATIVE
BASOPHILS # BLD AUTO: 0.04 10*3/MM3 (ref 0–0.2)
BASOPHILS NFR BLD AUTO: 0.5 % (ref 0–1.5)
DEPRECATED RDW RBC AUTO: 42.1 FL (ref 37–54)
EOSINOPHIL # BLD AUTO: 0.38 10*3/MM3 (ref 0–0.4)
EOSINOPHIL NFR BLD AUTO: 5 % (ref 0.3–6.2)
ERYTHROCYTE [DISTWIDTH] IN BLOOD BY AUTOMATED COUNT: 12.7 % (ref 12.3–15.4)
HCT VFR BLD AUTO: 44.2 % (ref 34–46.6)
HGB BLD-MCNC: 14.8 G/DL (ref 12–15.9)
IMM GRANULOCYTES # BLD AUTO: 0.01 10*3/MM3 (ref 0–0.05)
IMM GRANULOCYTES NFR BLD AUTO: 0.1 % (ref 0–0.5)
LYMPHOCYTES # BLD AUTO: 2.53 10*3/MM3 (ref 0.7–3.1)
LYMPHOCYTES NFR BLD AUTO: 33.6 % (ref 19.6–45.3)
MCH RBC QN AUTO: 30.1 PG (ref 26.6–33)
MCHC RBC AUTO-ENTMCNC: 33.5 G/DL (ref 31.5–35.7)
MCV RBC AUTO: 90 FL (ref 79–97)
MONOCYTES # BLD AUTO: 0.44 10*3/MM3 (ref 0.1–0.9)
MONOCYTES NFR BLD AUTO: 5.8 % (ref 5–12)
NEUTROPHILS NFR BLD AUTO: 4.14 10*3/MM3 (ref 1.7–7)
NEUTROPHILS NFR BLD AUTO: 55 % (ref 42.7–76)
NRBC BLD AUTO-RTO: 0 /100 WBC (ref 0–0.2)
PLATELET # BLD AUTO: 215 10*3/MM3 (ref 140–450)
PMV BLD AUTO: 9.4 FL (ref 6–12)
RBC # BLD AUTO: 4.91 10*6/MM3 (ref 3.77–5.28)
WBC NRBC COR # BLD AUTO: 7.54 10*3/MM3 (ref 3.4–10.8)

## 2024-02-01 PROCEDURE — 85025 COMPLETE CBC W/AUTO DIFF WBC: CPT | Performed by: OBSTETRICS & GYNECOLOGY

## 2024-02-01 PROCEDURE — 25810000003 LACTATED RINGERS PER 1000 ML: Performed by: ANESTHESIOLOGY

## 2024-02-01 PROCEDURE — 25010000002 MIDAZOLAM PER 1MG: Performed by: ANESTHESIOLOGY

## 2024-02-01 PROCEDURE — 25010000002 FENTANYL CITRATE (PF) 50 MCG/ML SOLUTION

## 2024-02-01 PROCEDURE — 81025 URINE PREGNANCY TEST: CPT | Performed by: OBSTETRICS & GYNECOLOGY

## 2024-02-01 PROCEDURE — 58558 HYSTEROSCOPY BIOPSY: CPT | Performed by: OBSTETRICS & GYNECOLOGY

## 2024-02-01 PROCEDURE — 25010000002 KETOROLAC TROMETHAMINE PER 15 MG

## 2024-02-01 PROCEDURE — 88305 TISSUE EXAM BY PATHOLOGIST: CPT | Performed by: OBSTETRICS & GYNECOLOGY

## 2024-02-01 PROCEDURE — 25010000002 ONDANSETRON PER 1 MG

## 2024-02-01 PROCEDURE — 25010000002 PROPOFOL 10 MG/ML EMULSION

## 2024-02-01 PROCEDURE — 25010000002 DEXAMETHASONE PER 1 MG

## 2024-02-01 RX ORDER — SODIUM CHLORIDE 0.9 % (FLUSH) 0.9 %
3 SYRINGE (ML) INJECTION EVERY 12 HOURS SCHEDULED
Status: DISCONTINUED | OUTPATIENT
Start: 2024-02-01 | End: 2024-02-01 | Stop reason: HOSPADM

## 2024-02-01 RX ORDER — MAGNESIUM HYDROXIDE 1200 MG/15ML
LIQUID ORAL AS NEEDED
Status: DISCONTINUED | OUTPATIENT
Start: 2024-02-01 | End: 2024-02-01 | Stop reason: HOSPADM

## 2024-02-01 RX ORDER — KETOROLAC TROMETHAMINE 30 MG/ML
INJECTION, SOLUTION INTRAMUSCULAR; INTRAVENOUS AS NEEDED
Status: DISCONTINUED | OUTPATIENT
Start: 2024-02-01 | End: 2024-02-01 | Stop reason: SURG

## 2024-02-01 RX ORDER — SODIUM CHLORIDE 0.9 % (FLUSH) 0.9 %
10 SYRINGE (ML) INJECTION AS NEEDED
Status: DISCONTINUED | OUTPATIENT
Start: 2024-02-01 | End: 2024-02-01 | Stop reason: HOSPADM

## 2024-02-01 RX ORDER — MIDAZOLAM HYDROCHLORIDE 2 MG/2ML
2 INJECTION, SOLUTION INTRAMUSCULAR; INTRAVENOUS ONCE
Status: COMPLETED | OUTPATIENT
Start: 2024-02-01 | End: 2024-02-01

## 2024-02-01 RX ORDER — PROMETHAZINE HYDROCHLORIDE 12.5 MG/1
25 TABLET ORAL ONCE AS NEEDED
Status: DISCONTINUED | OUTPATIENT
Start: 2024-02-01 | End: 2024-02-01 | Stop reason: HOSPADM

## 2024-02-01 RX ORDER — SODIUM CHLORIDE 9 MG/ML
40 INJECTION, SOLUTION INTRAVENOUS AS NEEDED
Status: DISCONTINUED | OUTPATIENT
Start: 2024-02-01 | End: 2024-02-01 | Stop reason: HOSPADM

## 2024-02-01 RX ORDER — FENTANYL CITRATE 50 UG/ML
INJECTION, SOLUTION INTRAMUSCULAR; INTRAVENOUS AS NEEDED
Status: DISCONTINUED | OUTPATIENT
Start: 2024-02-01 | End: 2024-02-01 | Stop reason: SURG

## 2024-02-01 RX ORDER — ONDANSETRON 2 MG/ML
4 INJECTION INTRAMUSCULAR; INTRAVENOUS ONCE AS NEEDED
Status: DISCONTINUED | OUTPATIENT
Start: 2024-02-01 | End: 2024-02-01 | Stop reason: HOSPADM

## 2024-02-01 RX ORDER — PROPOFOL 10 MG/ML
VIAL (ML) INTRAVENOUS AS NEEDED
Status: DISCONTINUED | OUTPATIENT
Start: 2024-02-01 | End: 2024-02-01 | Stop reason: SURG

## 2024-02-01 RX ORDER — PROMETHAZINE HYDROCHLORIDE 25 MG/1
25 SUPPOSITORY RECTAL ONCE AS NEEDED
Status: DISCONTINUED | OUTPATIENT
Start: 2024-02-01 | End: 2024-02-01 | Stop reason: HOSPADM

## 2024-02-01 RX ORDER — KETAMINE HCL IN NACL, ISO-OSM 100MG/10ML
SYRINGE (ML) INJECTION AS NEEDED
Status: DISCONTINUED | OUTPATIENT
Start: 2024-02-01 | End: 2024-02-01 | Stop reason: SURG

## 2024-02-01 RX ORDER — DEXAMETHASONE SODIUM PHOSPHATE 4 MG/ML
INJECTION, SOLUTION INTRA-ARTICULAR; INTRALESIONAL; INTRAMUSCULAR; INTRAVENOUS; SOFT TISSUE AS NEEDED
Status: DISCONTINUED | OUTPATIENT
Start: 2024-02-01 | End: 2024-02-01 | Stop reason: SURG

## 2024-02-01 RX ORDER — MEPERIDINE HYDROCHLORIDE 25 MG/ML
12.5 INJECTION INTRAMUSCULAR; INTRAVENOUS; SUBCUTANEOUS
Status: DISCONTINUED | OUTPATIENT
Start: 2024-02-01 | End: 2024-02-01 | Stop reason: HOSPADM

## 2024-02-01 RX ORDER — LIDOCAINE HYDROCHLORIDE 20 MG/ML
INJECTION, SOLUTION EPIDURAL; INFILTRATION; INTRACAUDAL; PERINEURAL AS NEEDED
Status: DISCONTINUED | OUTPATIENT
Start: 2024-02-01 | End: 2024-02-01 | Stop reason: SURG

## 2024-02-01 RX ORDER — DEXMEDETOMIDINE HYDROCHLORIDE 100 UG/ML
INJECTION, SOLUTION INTRAVENOUS AS NEEDED
Status: DISCONTINUED | OUTPATIENT
Start: 2024-02-01 | End: 2024-02-01 | Stop reason: SURG

## 2024-02-01 RX ORDER — ACETAMINOPHEN 500 MG
1000 TABLET ORAL ONCE
Status: COMPLETED | OUTPATIENT
Start: 2024-02-01 | End: 2024-02-01

## 2024-02-01 RX ORDER — ONDANSETRON 2 MG/ML
INJECTION INTRAMUSCULAR; INTRAVENOUS AS NEEDED
Status: DISCONTINUED | OUTPATIENT
Start: 2024-02-01 | End: 2024-02-01 | Stop reason: SURG

## 2024-02-01 RX ORDER — OXYCODONE HYDROCHLORIDE 5 MG/1
5 TABLET ORAL
Status: DISCONTINUED | OUTPATIENT
Start: 2024-02-01 | End: 2024-02-01 | Stop reason: HOSPADM

## 2024-02-01 RX ORDER — SODIUM CHLORIDE, SODIUM LACTATE, POTASSIUM CHLORIDE, CALCIUM CHLORIDE 600; 310; 30; 20 MG/100ML; MG/100ML; MG/100ML; MG/100ML
9 INJECTION, SOLUTION INTRAVENOUS CONTINUOUS PRN
Status: DISCONTINUED | OUTPATIENT
Start: 2024-02-01 | End: 2024-02-01 | Stop reason: HOSPADM

## 2024-02-01 RX ADMIN — ONDANSETRON 4 MG: 2 INJECTION INTRAMUSCULAR; INTRAVENOUS at 08:58

## 2024-02-01 RX ADMIN — KETOROLAC TROMETHAMINE 30 MG: 30 INJECTION, SOLUTION INTRAMUSCULAR; INTRAVENOUS at 08:59

## 2024-02-01 RX ADMIN — DEXAMETHASONE SODIUM PHOSPHATE 4 MG: 4 INJECTION, SOLUTION INTRAMUSCULAR; INTRAVENOUS at 08:58

## 2024-02-01 RX ADMIN — DEXMEDETOMIDINE HYDROCHLORIDE 10 MCG: 100 INJECTION, SOLUTION, CONCENTRATE INTRAVENOUS at 08:55

## 2024-02-01 RX ADMIN — MIDAZOLAM HYDROCHLORIDE 2 MG: 1 INJECTION, SOLUTION INTRAMUSCULAR; INTRAVENOUS at 08:14

## 2024-02-01 RX ADMIN — LIDOCAINE HYDROCHLORIDE 50 MG: 20 INJECTION, SOLUTION EPIDURAL; INFILTRATION; INTRACAUDAL; PERINEURAL at 08:37

## 2024-02-01 RX ADMIN — DEXMEDETOMIDINE HYDROCHLORIDE 10 MCG: 100 INJECTION, SOLUTION, CONCENTRATE INTRAVENOUS at 08:49

## 2024-02-01 RX ADMIN — PROPOFOL 100 MG: 10 INJECTION, EMULSION INTRAVENOUS at 08:37

## 2024-02-01 RX ADMIN — PROPOFOL 200 MCG/KG/MIN: 10 INJECTION, EMULSION INTRAVENOUS at 08:37

## 2024-02-01 RX ADMIN — Medication 25 MG: at 08:45

## 2024-02-01 RX ADMIN — ACETAMINOPHEN 1000 MG: 500 TABLET ORAL at 07:52

## 2024-02-01 RX ADMIN — SODIUM CHLORIDE, POTASSIUM CHLORIDE, SODIUM LACTATE AND CALCIUM CHLORIDE 9 ML/HR: 600; 310; 30; 20 INJECTION, SOLUTION INTRAVENOUS at 07:52

## 2024-02-01 RX ADMIN — FENTANYL CITRATE 50 MCG: 50 INJECTION, SOLUTION INTRAMUSCULAR; INTRAVENOUS at 08:49

## 2024-02-01 NOTE — DISCHARGE INSTRUCTIONS
DISCHARGE INSTRUCTIONS  GYNECOLOGICAL  PROCEDURES      For your surgery you had:  Monitored anesthesia care  You may experience dizziness, drowsiness, or lightheadedness for several hours following surgery.  Do not stay alone today or tonight.  Limit your activity for 24 hours.  Resume your diet slowly.  Follow any special dietary instructions you may have been given by your doctor.  You should not drive or operate machinery, drink alcohol, or sign legally binding documents for 24 hours or while you are taking pain medication.    NOTIFY YOUR DOCTOR IF YOU EXPERIENCE ANY OF THE FOLLOWING:  Temperature greater than 101 degrees Fahrenheit  Shaking Chills  Redness or excessive drainage from incision  Nausea, vomiting and/or pain that is not controlled by prescribed medications  Increase in bleeding or bleeding that is excessive  Unable to urinate in 6 hours after surgery  If unable to reach your doctor, please go to the closest Emergency Room [x] Nothing in the vagina until cleared at follow up to include intercourse, douches, or tampons.  [x] Vaginal bleeding may be expected for several days with flow decreasing with time and never any heavier than a normal period.  If you have foul smelling discharge, notify your physician.      SPECIAL INSTRUCTIONS:  Follow any verbal instructions given by Dr. Magana.      Last dose of pain medication was given at:   Tylenol (1000mg) last at 7:52am. Do not exceed 4000mg of tylenol in a 24hour period.  May take tylenol next at 1:52pm if needed.  Toradol last at 9am. May take ibuprofen next at 2pm if needed.

## 2024-02-01 NOTE — ANESTHESIA PREPROCEDURE EVALUATION
Anesthesia Evaluation     Patient summary reviewed and Nursing notes reviewed   no history of anesthetic complications:   NPO Solid Status: > 8 hours  NPO Liquid Status: > 2 hours           Airway   Mallampati: III  TM distance: >3 FB  Neck ROM: full  No difficulty expected  Dental      Pulmonary - negative pulmonary ROS and normal exam    breath sounds clear to auscultation  Cardiovascular - negative cardio ROS and normal exam  Exercise tolerance: good (4-7 METS)    Rhythm: regular  Rate: normal        Neuro/Psych  (+) headaches, psychiatric history  GI/Hepatic/Renal/Endo - negative ROS     Musculoskeletal (-) negative ROS    Abdominal    Substance History - negative use     OB/GYN negative ob/gyn ROS         Other - negative ROS       ROS/Med Hx Other: PAT Nursing Notes unavailable.                     Anesthesia Plan    ASA 2     general     (Patient understands anesthesia not responsible for dental damage.    Please proceed w heavy mac/TIVA spontaneous breathing (pt requests))  intravenous induction     Anesthetic plan, risks, benefits, and alternatives have been provided, discussed and informed consent has been obtained with: patient.    Use of blood products discussed with patient .    Plan discussed with CRNA.        CODE STATUS:

## 2024-02-01 NOTE — ANESTHESIA POSTPROCEDURE EVALUATION
Patient: Frances Rao    Procedure Summary       Date: 02/01/24 Room / Location: [facility] OR 04 / [facility] MAIN OR    Anesthesia Start: 0834 Anesthesia Stop: 0913    Procedure: DILATATION AND CURETTAGE HYSTEROSCOPY (Uterus) Diagnosis:       Thickened endometrium      (Thickened endometrium [R93.89])    Surgeons: Afshan Magana DO Provider: Des Littlejohn MD    Anesthesia Type: general ASA Status: 2            Anesthesia Type: general    Vitals  Vitals Value Taken Time   /89 02/01/24 0937   Temp 36.3 °C (97.3 °F) 02/01/24 0910   Pulse 44 02/01/24 0937   Resp 16 02/01/24 0930   SpO2 97 % 02/01/24 0937   Vitals shown include unfiled device data.        Post Anesthesia Care and Evaluation    Patient location during evaluation: bedside  Patient participation: complete - patient participated  Level of consciousness: awake  Pain management: adequate    Airway patency: patent  PONV Status: none  Cardiovascular status: acceptable  Respiratory status: acceptable  Hydration status: acceptable    Comments: An Anesthesiologist personally participated in the most demanding procedures (including induction and emergence if applicable) in the anesthesia plan, monitored the course of anesthesia administration at frequent intervals and remained physically present and available for immediate diagnosis and treatment of emergencies.

## 2024-02-01 NOTE — OP NOTE
Pre-Op diagnosis: Abnormal Uterine Bleeding, enlarged fibroid uterus, cervical stenosis     Post-Op diagnosis:  same    Procedure:  Dilation & Curettage, Hysteroscopy (Extraction of the endometrium),     Surgeon:  Afshan Magana DO     Assistant: Jessica Clark,   was responsible for performing the following activities: Retraction and their skilled assistance was necessary for the success of this case.        Anesthesia:  Des Littlejohn MD    Anesthesia Type:  General    EBL:  Minimal    Specimen:  Endometrial curettings to pathology    Complications:  None    Disposition:  To Recovery Room in stable condition    Findings:  Normal uterus    After general anesthesia was administered, the patient was placed in candy cane stirrups. She was then prepped and draped in a normal sterile fashion.      A weighted speculum was placed in the patient's vagina, and the anterior lip of the cervix was grasped with a single-tooth tenaculum  clamp.  The cervix was dilated using arpita dilators to a size 5mm  The hysteroscope was inserted.  The distension medium was Normal Saline.    The findings are noted above.    Using a #2 curette, the endometrium was curetted until a gritty texture was noted.  The hysteroscope was re-inserted and a clean endometrial cavity was encountered.    All instruments were removed from the patient's vagina.  Sponge, lap, and instrument counts are correct times 2.  The patient was taken to the recovery room awake and in stable condition.    Her surgery went well and she is stable.  All of her discharge instructions have been given to her personally by me.  She is to have vaginal rest for  weeks.  No lifting anything heavier than 10 lb until it is not painful.  No driving while taking pain medications. She will follow up with me in 2 weeks.      Electronically signed by:    Afshan Magana DO  02/01/24  09:12 EST

## 2024-02-02 LAB
CYTO UR: NORMAL
LAB AP CASE REPORT: NORMAL
LAB AP CLINICAL INFORMATION: NORMAL
PATH REPORT.FINAL DX SPEC: NORMAL
PATH REPORT.GROSS SPEC: NORMAL

## 2024-02-13 ENCOUNTER — OFFICE VISIT (OUTPATIENT)
Dept: OBSTETRICS AND GYNECOLOGY | Facility: CLINIC | Age: 44
End: 2024-02-13
Payer: COMMERCIAL

## 2024-02-13 VITALS
WEIGHT: 160 LBS | DIASTOLIC BLOOD PRESSURE: 81 MMHG | HEIGHT: 62 IN | BODY MASS INDEX: 29.44 KG/M2 | HEART RATE: 80 BPM | SYSTOLIC BLOOD PRESSURE: 118 MMHG

## 2024-02-13 DIAGNOSIS — Z98.890 POST-OPERATIVE STATE: Primary | ICD-10-CM

## 2024-02-20 ENCOUNTER — TELEPHONE (OUTPATIENT)
Dept: OBSTETRICS AND GYNECOLOGY | Facility: CLINIC | Age: 44
End: 2024-02-20
Payer: COMMERCIAL

## 2024-02-20 NOTE — TELEPHONE ENCOUNTER
Caller: Frances Rao    Relationship: Self    Best call back number: [phone]      Who are you requesting to speak with (clinical staff, DR. CALLE      What was the call regarding: PATIENT ADVISED THAT PER LAST VISIT ON 2/13/24, IT WAS DISCUSSED THAT MEDICATION WOULD BE PRESCRIBED TO ASSIST WITH FIBROIDS. PATIENT WOULD LIKE TO KNOW WHAT TYPE OF MEDICATION AND ITS' SIDE EFFECTS BEFORE TAKING.

## 2024-02-21 NOTE — TELEPHONE ENCOUNTER
Patient called I have attached her note.  Last seen 2/13/24.  Patient has questions about meds.  I didn't see any meds in Epic sent in that day.

## 2024-02-26 NOTE — TELEPHONE ENCOUNTER
Called patient informed her name of Rx was Myf[facility].  Patient is going to decide then she will call back & let me know if she is wanting Rx

## 2024-02-26 NOTE — TELEPHONE ENCOUNTER
I wasn't sure if you wanted me to send an Rx for Myfembree?  I have attached an Rx for Myfembree.  [facility] isn't one I'm familiar with I left all the boxes blank

## 2024-03-13 ENCOUNTER — HOSPITAL ENCOUNTER (OUTPATIENT)
Dept: MAMMOGRAPHY | Facility: HOSPITAL | Age: 44
Discharge: HOME | End: 2024-03-13
Admitting: PHYSICIAN ASSISTANT
Payer: COMMERCIAL

## 2024-03-13 DIAGNOSIS — Z12.31 ENCOUNTER FOR SCREENING MAMMOGRAM FOR BREAST CANCER: ICD-10-CM

## 2024-03-13 PROCEDURE — 77067 SCR MAMMO BI INCL CAD: CPT

## 2024-03-13 PROCEDURE — 77063 BREAST TOMOSYNTHESIS BI: CPT

## 2024-03-14 DIAGNOSIS — R92.8 ABNORMAL MAMMOGRAM: Primary | ICD-10-CM

## 2024-04-04 ENCOUNTER — HOSPITAL ENCOUNTER (OUTPATIENT)
Dept: ULTRASOUND IMAGING | Facility: HOSPITAL | Age: 44
Discharge: HOME | End: 2024-04-04
Payer: COMMERCIAL

## 2024-04-04 ENCOUNTER — HOSPITAL ENCOUNTER (OUTPATIENT)
Dept: MAMMOGRAPHY | Facility: HOSPITAL | Age: 44
Discharge: HOME | End: 2024-04-04
Payer: COMMERCIAL

## 2024-04-04 DIAGNOSIS — R92.8 ABNORMAL MAMMOGRAM: ICD-10-CM

## 2024-04-04 PROCEDURE — 76642 ULTRASOUND BREAST LIMITED: CPT

## 2024-04-04 PROCEDURE — G0279 TOMOSYNTHESIS, MAMMO: HCPCS

## 2024-04-04 PROCEDURE — 77066 DX MAMMO INCL CAD BI: CPT

## 2024-04-08 ENCOUNTER — OFFICE VISIT (OUTPATIENT)
Dept: INTERNAL MEDICINE | Facility: CLINIC | Age: 44
End: 2024-04-08
Payer: COMMERCIAL

## 2024-04-08 VITALS
HEIGHT: 62 IN | HEART RATE: 87 BPM | BODY MASS INDEX: 29.74 KG/M2 | RESPIRATION RATE: 18 BRPM | SYSTOLIC BLOOD PRESSURE: 120 MMHG | TEMPERATURE: 97.5 F | DIASTOLIC BLOOD PRESSURE: 68 MMHG | WEIGHT: 161.6 LBS | OXYGEN SATURATION: 98 %

## 2024-04-08 DIAGNOSIS — Z13.220 SCREENING FOR CHOLESTEROL LEVEL: ICD-10-CM

## 2024-04-08 DIAGNOSIS — Z11.59 SCREENING FOR VIRAL DISEASE: ICD-10-CM

## 2024-04-08 DIAGNOSIS — R92.8 ABNORMAL MAMMOGRAM: Primary | ICD-10-CM

## 2024-04-08 DIAGNOSIS — R23.2 HOT FLASHES: Primary | ICD-10-CM

## 2024-04-08 LAB
ALBUMIN SERPL-MCNC: 4.7 G/DL (ref 3.5–5.2)
ALBUMIN/GLOB SERPL: 2.1 G/DL
ALP SERPL-CCNC: 81 U/L (ref 39–117)
ALT SERPL W P-5'-P-CCNC: 19 U/L (ref 1–33)
ANION GAP SERPL CALCULATED.3IONS-SCNC: 8 MMOL/L (ref 5–15)
AST SERPL-CCNC: 14 U/L (ref 1–32)
BASOPHILS # BLD AUTO: 0.04 10*3/MM3 (ref 0–0.2)
BASOPHILS NFR BLD AUTO: 0.6 % (ref 0–1.5)
BILIRUB SERPL-MCNC: 0.3 MG/DL (ref 0–1.2)
BUN SERPL-MCNC: 11 MG/DL (ref 6–20)
BUN/CREAT SERPL: 13.8 (ref 7–25)
CALCIUM SPEC-SCNC: 9.8 MG/DL (ref 8.6–10.5)
CHLORIDE SERPL-SCNC: 106 MMOL/L (ref 98–107)
CHOLEST SERPL-MCNC: 192 MG/DL (ref 0–200)
CO2 SERPL-SCNC: 28 MMOL/L (ref 22–29)
CREAT SERPL-MCNC: 0.8 MG/DL (ref 0.57–1)
DEPRECATED RDW RBC AUTO: 41.5 FL (ref 37–54)
EGFRCR SERPLBLD CKD-EPI 2021: 95.1 ML/MIN/1.73
EOSINOPHIL # BLD AUTO: 0.33 10*3/MM3 (ref 0–0.4)
EOSINOPHIL NFR BLD AUTO: 5.1 % (ref 0.3–6.2)
ERYTHROCYTE [DISTWIDTH] IN BLOOD BY AUTOMATED COUNT: 12.7 % (ref 12.3–15.4)
GLOBULIN UR ELPH-MCNC: 2.2 GM/DL
GLUCOSE SERPL-MCNC: 91 MG/DL (ref 65–99)
HCT VFR BLD AUTO: 42.6 % (ref 34–46.6)
HCV AB SER DONR QL: NORMAL
HDLC SERPL-MCNC: 41 MG/DL (ref 40–60)
HGB BLD-MCNC: 14.3 G/DL (ref 12–15.9)
IMM GRANULOCYTES # BLD AUTO: 0.01 10*3/MM3 (ref 0–0.05)
IMM GRANULOCYTES NFR BLD AUTO: 0.2 % (ref 0–0.5)
LDLC SERPL CALC-MCNC: 136 MG/DL (ref 0–100)
LDLC/HDLC SERPL: 3.29 {RATIO}
LYMPHOCYTES # BLD AUTO: 2.11 10*3/MM3 (ref 0.7–3.1)
LYMPHOCYTES NFR BLD AUTO: 32.8 % (ref 19.6–45.3)
MCH RBC QN AUTO: 30 PG (ref 26.6–33)
MCHC RBC AUTO-ENTMCNC: 33.6 G/DL (ref 31.5–35.7)
MCV RBC AUTO: 89.3 FL (ref 79–97)
MONOCYTES # BLD AUTO: 0.5 10*3/MM3 (ref 0.1–0.9)
MONOCYTES NFR BLD AUTO: 7.8 % (ref 5–12)
NEUTROPHILS NFR BLD AUTO: 3.44 10*3/MM3 (ref 1.7–7)
NEUTROPHILS NFR BLD AUTO: 53.5 % (ref 42.7–76)
NRBC BLD AUTO-RTO: 0 /100 WBC (ref 0–0.2)
PLATELET # BLD AUTO: 205 10*3/MM3 (ref 140–450)
PMV BLD AUTO: 10 FL (ref 6–12)
POTASSIUM SERPL-SCNC: 4.5 MMOL/L (ref 3.5–5.2)
PROT SERPL-MCNC: 6.9 G/DL (ref 6–8.5)
RBC # BLD AUTO: 4.77 10*6/MM3 (ref 3.77–5.28)
SODIUM SERPL-SCNC: 142 MMOL/L (ref 136–145)
T4 FREE SERPL-MCNC: 1.11 NG/DL (ref 0.93–1.7)
TRIGL SERPL-MCNC: 80 MG/DL (ref 0–150)
TSH SERPL DL<=0.05 MIU/L-ACNC: 2.43 UIU/ML (ref 0.27–4.2)
VLDLC SERPL-MCNC: 15 MG/DL (ref 5–40)
WBC NRBC COR # BLD AUTO: 6.43 10*3/MM3 (ref 3.4–10.8)

## 2024-04-08 PROCEDURE — 80053 COMPREHEN METABOLIC PANEL: CPT | Performed by: PHYSICIAN ASSISTANT

## 2024-04-08 PROCEDURE — 84439 ASSAY OF FREE THYROXINE: CPT | Performed by: PHYSICIAN ASSISTANT

## 2024-04-08 PROCEDURE — 80061 LIPID PANEL: CPT | Performed by: PHYSICIAN ASSISTANT

## 2024-04-08 PROCEDURE — 84443 ASSAY THYROID STIM HORMONE: CPT | Performed by: PHYSICIAN ASSISTANT

## 2024-04-08 PROCEDURE — 86803 HEPATITIS C AB TEST: CPT | Performed by: PHYSICIAN ASSISTANT

## 2024-04-08 PROCEDURE — 85025 COMPLETE CBC W/AUTO DIFF WBC: CPT | Performed by: PHYSICIAN ASSISTANT

## 2024-04-08 RX ORDER — VENLAFAXINE HYDROCHLORIDE 37.5 MG/1
37.5 CAPSULE, EXTENDED RELEASE ORAL DAILY
Qty: 30 CAPSULE | Refills: 1 | Status: SHIPPED | OUTPATIENT
Start: 2024-04-08

## 2024-04-08 NOTE — PROGRESS NOTES
Chief Complaint  Follow-up (2 month), Night Sweats, heat flashes, and Insomnia    Subjective          Frances Rao presents to [facility]  History of Present Illness  Pt has been having hot flashes, worst at night since 2021  Hot flashes occur during the day as well  She has not had any vaginal bleeding since D&C in Feb 2024  Pt has issues with insomnia. She falls asleep ok but unable to stay asleep. She wakes up every 1-2 hr sweating. Sleeps with fan and a/c on.  Admits to feeling down, sad at times  When she is depressed, she does not want to leave her home.  States she has her moments since 2016 when her grandma passed away  Denies si/hi     Past Medical History:   Diagnosis Date    Abnormal Pap smear of cervix     Anxiety     Fibroid     Migraine     Ovarian cyst         Past Surgical History:   Procedure Laterality Date    COLPOSCOPY      D & C HYSTEROSCOPY N/A 2/1/2024    Procedure: DILATATION AND CURETTAGE HYSTEROSCOPY;  Surgeon: Afshan Magana DO;  Location: [facility] OR;  Service: Gynecology;  Laterality: N/A;    WISDOM TOOTH EXTRACTION          Current Outpatient Medications on File Prior to Visit   Medication Sig Dispense Refill    doxylamine (UNISOM) 25 MG tablet Take 1 tablet by mouth At Night As Needed for Sleep.      [DISCONTINUED] ondansetron ODT (ZOFRAN-ODT) 4 MG disintegrating tablet Place 1 tablet on the tongue Every 8 (Eight) Hours As Needed for Nausea or Vomiting. 12 tablet 0    [DISCONTINUED] prenatal vitamin (prenatal, CLASSIC, vitamin) tablet Take  by mouth Daily.       No current facility-administered medications on file prior to visit.        No Known Allergies    Social History     Tobacco Use   Smoking Status Every Day    Current packs/day: 0.50    Average packs/day: 0.5 packs/day for 22.0 years (11.0 ttl pk-yrs)    Types: Cigarettes   Smokeless Tobacco Never   Tobacco Comments    Last 1/31/24          Objective   Vital Signs:   BP  "120/68 (BP Location: Left arm, Patient Position: Sitting, Cuff Size: Adult)   Pulse 87   Temp 97.5 °F (36.4 °C) (Temporal)   Resp 18   Ht 157.5 cm (62.01\")   Wt 73.3 kg (161 lb 9.6 oz)   SpO2 98%   BMI 29.55 kg/m²     Physical Exam  Vitals reviewed.   Constitutional:       Appearance: Normal appearance.   HENT:      Head: Normocephalic and atraumatic.      Nose: Nose normal.      Mouth/Throat:      Mouth: Mucous membranes are moist.   Eyes:      Extraocular Movements: Extraocular movements intact.      Conjunctiva/sclera: Conjunctivae normal.      Pupils: Pupils are equal, round, and reactive to light.   Cardiovascular:      Rate and Rhythm: Normal rate and regular rhythm.   Pulmonary:      Effort: Pulmonary effort is normal.      Breath sounds: Normal breath sounds.   Abdominal:      General: Abdomen is flat. Bowel sounds are normal.      Palpations: Abdomen is soft.   Musculoskeletal:         General: Normal range of motion.   Neurological:      General: No focal deficit present.      Mental Status: She is alert and oriented to person, place, and time.   Psychiatric:         Mood and Affect: Mood normal.        Result Review :                     Assessment and Plan    Diagnoses and all orders for this visit:    1. Hot flashes (Primary)  -     Comprehensive Metabolic Panel  -     CBC & Differential  -     TSH; Future  -     T4, Free; Future  -     TSH  -     T4, Free    2. Screening for cholesterol level  -     Lipid Panel    3. Screening for viral disease  -     Hepatitis C Antibody    Other orders  -     venlafaxine XR (Effexor XR) 37.5 MG 24 hr capsule; Take 1 capsule by mouth Daily.  Dispense: 30 capsule; Refill: 1  -     Tdap Vaccine => 8yo IM (BOOSTRIX)     Discussed ddx. Will get labs today. Discussed medication options. Will start Effexor for mood and hot flashes. Discussed f/u with GYN as directed.   Pt understands and agrees with plan.       Follow Up   Return in about 6 weeks (around " 5/20/2024).  Patient was given instructions and counseling regarding her condition or for health maintenance advice. Please see specific information pulled into the AVS if appropriate.

## 2024-04-09 ENCOUNTER — TELEPHONE (OUTPATIENT)
Dept: INTERNAL MEDICINE | Facility: CLINIC | Age: 44
End: 2024-04-09
Payer: COMMERCIAL

## 2024-04-09 NOTE — TELEPHONE ENCOUNTER
Caller: Frances Rao    Relationship: Self    Best call back number: [phone]    What was the call regarding: PATIENT IS RETURNING [facility]S CALL.

## 2024-05-06 ENCOUNTER — TELEPHONE (OUTPATIENT)
Dept: OBSTETRICS AND GYNECOLOGY | Facility: CLINIC | Age: 44
End: 2024-05-06
Payer: COMMERCIAL

## 2024-05-07 ENCOUNTER — PREP FOR SURGERY (OUTPATIENT)
Dept: OTHER | Facility: HOSPITAL | Age: 44
End: 2024-05-07
Payer: COMMERCIAL

## 2024-05-07 DIAGNOSIS — N93.9 ABNORMAL UTERINE BLEEDING (AUB): Primary | ICD-10-CM

## 2024-05-07 DIAGNOSIS — D25.9 UTERINE LEIOMYOMA, UNSPECIFIED LOCATION: ICD-10-CM

## 2024-05-07 RX ORDER — SODIUM CHLORIDE 0.9 % (FLUSH) 0.9 %
10 SYRINGE (ML) INJECTION AS NEEDED
OUTPATIENT
Start: 2024-05-07

## 2024-05-07 RX ORDER — SODIUM CHLORIDE 0.9 % (FLUSH) 0.9 %
3 SYRINGE (ML) INJECTION EVERY 12 HOURS SCHEDULED
OUTPATIENT
Start: 2024-05-07

## 2024-05-07 RX ORDER — BUPIVACAINE HCL/0.9 % NACL/PF 0.1 %
2000 PLASTIC BAG, INJECTION (ML) EPIDURAL ONCE
OUTPATIENT
Start: 2024-05-07 | End: 2024-05-07

## 2024-05-07 RX ORDER — PHENAZOPYRIDINE HYDROCHLORIDE 100 MG/1
100 TABLET, FILM COATED ORAL ONCE
OUTPATIENT
Start: 2024-05-07 | End: 2024-05-07

## 2024-05-07 RX ORDER — SODIUM CHLORIDE 9 MG/ML
40 INJECTION, SOLUTION INTRAVENOUS AS NEEDED
OUTPATIENT
Start: 2024-05-07

## 2024-05-07 RX ORDER — ACETAMINOPHEN 500 MG
1000 TABLET ORAL ONCE
OUTPATIENT
Start: 2024-05-07 | End: 2024-05-07

## 2024-05-20 ENCOUNTER — OFFICE VISIT (OUTPATIENT)
Dept: INTERNAL MEDICINE | Facility: CLINIC | Age: 44
End: 2024-05-20
Payer: COMMERCIAL

## 2024-05-20 VITALS
HEART RATE: 85 BPM | OXYGEN SATURATION: 95 % | BODY MASS INDEX: 29.44 KG/M2 | RESPIRATION RATE: 18 BRPM | WEIGHT: 160 LBS | DIASTOLIC BLOOD PRESSURE: 76 MMHG | SYSTOLIC BLOOD PRESSURE: 132 MMHG | TEMPERATURE: 97.4 F | HEIGHT: 62 IN

## 2024-05-20 DIAGNOSIS — M54.50 ACUTE BILATERAL LOW BACK PAIN WITHOUT SCIATICA: ICD-10-CM

## 2024-05-20 DIAGNOSIS — R23.2 HOT FLASHES: Primary | ICD-10-CM

## 2024-05-20 DIAGNOSIS — G47.00 INSOMNIA, UNSPECIFIED TYPE: ICD-10-CM

## 2024-05-20 PROCEDURE — 1160F RVW MEDS BY RX/DR IN RCRD: CPT | Performed by: PHYSICIAN ASSISTANT

## 2024-05-20 PROCEDURE — 1159F MED LIST DOCD IN RCRD: CPT | Performed by: PHYSICIAN ASSISTANT

## 2024-05-20 PROCEDURE — 99214 OFFICE O/P EST MOD 30 MIN: CPT | Performed by: PHYSICIAN ASSISTANT

## 2024-05-20 RX ORDER — CYCLOBENZAPRINE HCL 10 MG
10 TABLET ORAL DAILY PRN
Qty: 30 TABLET | Refills: 0 | Status: SHIPPED | OUTPATIENT
Start: 2024-05-20

## 2024-05-20 RX ORDER — CLONIDINE HYDROCHLORIDE 0.1 MG/1
0.1 TABLET ORAL NIGHTLY
Qty: 30 TABLET | Refills: 1 | Status: SHIPPED | OUTPATIENT
Start: 2024-05-20

## 2024-05-20 NOTE — PROGRESS NOTES
Chief Complaint  Follow-up (6 week follow up for hot flashes and insomnia, venlafaxine 37.5 mg once a day for hot flashes is not helping, still not sleeping well because of the hot flashes, unsure if she has restless leg syndrome legs are bothering her when she is trying to sleep, also wanted to see about muscle relaxer for back pain until she can have her hysterectomy )    Subjective          Frances Rao presents to [facility]  History of Present Illness  Pt here for follow up on several issues     States she could not feel any improvement in hot flashes or mood with Effexor.   She states she wakes up drenched in sweat  She is using fan and a/c   Insomnia: She is taking unisom.   States when she lays down at night, she has to get up and walk because legs feel they are constantly moving them.   States this happened after taking unisom in the past  She does not snore that she knows of     LBP: states she has had chronic back pain. It has worsened recently. Feels like pressure   Worse with certain movements   Denies recent injuries   states she was told by gyn the back pain is coming from fibroids  She is planning to schedule surgery for July.  Back feels tight   Denies incontinence or saddle anesthesia     Pt states mood is doing ok  She feels down due to not having a job  Denies si/hi     Past Medical History:   Diagnosis Date    Abnormal Pap smear of cervix     Anxiety     Fibroid     Migraine     Ovarian cyst         Past Surgical History:   Procedure Laterality Date    COLPOSCOPY      D & C HYSTEROSCOPY N/A 2/1/2024    Procedure: DILATATION AND CURETTAGE HYSTEROSCOPY;  Surgeon: Afshan Magana DO;  Location: [facility] MAIN OR;  Service: Gynecology;  Laterality: N/A;    WISDOM TOOTH EXTRACTION          Current Outpatient Medications on File Prior to Visit   Medication Sig Dispense Refill    doxylamine (UNISOM) 25 MG tablet Take 1 tablet by mouth At Night As Needed  "for Sleep.      [DISCONTINUED] venlafaxine XR (Effexor XR) 37.5 MG 24 hr capsule Take 1 capsule by mouth Daily. (Patient not taking: Reported on 5/20/2024) 30 capsule 1     No current facility-administered medications on file prior to visit.        No Known Allergies    Social History     Tobacco Use   Smoking Status Every Day    Current packs/day: 0.50    Average packs/day: 0.5 packs/day for 49.8 years (24.9 ttl pk-yrs)    Types: Cigarettes    Start date: 8/12/1996   Smokeless Tobacco Never   Tobacco Comments    Last 1/31/24          Objective   Vital Signs:   /76 (BP Location: Left arm, Patient Position: Sitting, Cuff Size: Adult)   Pulse 85   Temp 97.4 °F (36.3 °C) (Temporal)   Resp 18   Ht 157.5 cm (62.01\")   Wt 72.6 kg (160 lb)   SpO2 95%   BMI 29.25 kg/m²     Physical Exam  Vitals reviewed.   Constitutional:       Appearance: Normal appearance.   HENT:      Head: Normocephalic and atraumatic.      Nose: Nose normal.      Mouth/Throat:      Mouth: Mucous membranes are moist.   Eyes:      Extraocular Movements: Extraocular movements intact.      Conjunctiva/sclera: Conjunctivae normal.      Pupils: Pupils are equal, round, and reactive to light.   Cardiovascular:      Rate and Rhythm: Normal rate and regular rhythm.   Pulmonary:      Effort: Pulmonary effort is normal.      Breath sounds: Normal breath sounds.   Abdominal:      General: Abdomen is flat. Bowel sounds are normal.      Palpations: Abdomen is soft.   Musculoskeletal:         General: Normal range of motion.   Neurological:      General: No focal deficit present.      Mental Status: She is alert and oriented to person, place, and time.   Psychiatric:         Mood and Affect: Mood normal.        Result Review :   The following data was reviewed by: Maria Guadalupe Francisco PA-C on 05/20/2024:  Common labs          2/1/2024    07:06 4/8/2024    12:42   Common Labs   Glucose  91    BUN  11    Creatinine  0.80    Sodium  142    Potassium  4.5  "   Chloride  106    Calcium  9.8    Albumin  4.7    Total Bilirubin  0.3    Alkaline Phosphatase  81    AST (SGOT)  14    ALT (SGPT)  19    WBC 7.54  6.43    Hemoglobin 14.8  14.3    Hematocrit 44.2  42.6    Platelets 215  205    Total Cholesterol  192    Triglycerides  80    HDL Cholesterol  41    LDL Cholesterol   136                           Assessment and Plan    Diagnoses and all orders for this visit:    1. Hot flashes (Primary)  Comments:  Pt self d/c Effexor. Will try clonidine. Discussed need to monitor bp. Will recheck at f/u.    2. Insomnia, unspecified type  Comments:  Will refer for sleep study to [facility], treating hot flashes with additional medicine.  Orders:  -     Ambulatory Referral to Sleep Medicine    3. Acute bilateral low back pain without sciatica  Comments:  Discussed ddx back pain. Pt is going to schedule hysterectomy. Will give muscle realxer to use prn. Discussed not using before driving/work due to drosiness.    Other orders  -     cyclobenzaprine (FLEXERIL) 10 MG tablet; Take 1 tablet by mouth Daily As Needed for Muscle Spasms.  Dispense: 30 tablet; Refill: 0  -     cloNIDine (Catapres) 0.1 MG tablet; Take 1 tablet by mouth Every Night.  Dispense: 30 tablet; Refill: 1      I spent 35 minutes caring for Frances on this date of service. This time includes time spent by me in the following activities:preparing for the visit, reviewing tests, obtaining and/or reviewing a separately obtained history, performing a medically appropriate examination and/or evaluation , counseling and educating the patient/family/caregiver, ordering medications, tests, or procedures, referring and communicating with other health care professionals , and documenting information in the medical record  Follow Up   Return in about 1 month (around 6/20/2024).  Patient was given instructions and counseling regarding her condition or for health maintenance advice. Please see specific information pulled into the AVS if  appropriate.

## 2024-05-21 ENCOUNTER — OFFICE VISIT (OUTPATIENT)
Dept: SLEEP MEDICINE | Facility: HOSPITAL | Age: 44
End: 2024-05-21
Payer: COMMERCIAL

## 2024-05-21 ENCOUNTER — LAB (OUTPATIENT)
Dept: LAB | Facility: HOSPITAL | Age: 44
End: 2024-05-21
Payer: COMMERCIAL

## 2024-05-21 VITALS
DIASTOLIC BLOOD PRESSURE: 60 MMHG | WEIGHT: 163 LBS | SYSTOLIC BLOOD PRESSURE: 109 MMHG | BODY MASS INDEX: 30 KG/M2 | HEART RATE: 81 BPM | HEIGHT: 62 IN | OXYGEN SATURATION: 97 %

## 2024-05-21 DIAGNOSIS — G25.81 RESTLESS LEGS SYNDROME (RLS): Primary | ICD-10-CM

## 2024-05-21 DIAGNOSIS — G47.01 INSOMNIA DUE TO MEDICAL CONDITION: ICD-10-CM

## 2024-05-21 DIAGNOSIS — G25.81 RESTLESS LEGS SYNDROME (RLS): ICD-10-CM

## 2024-05-21 DIAGNOSIS — F17.210 CIGARETTE NICOTINE DEPENDENCE WITHOUT COMPLICATION: ICD-10-CM

## 2024-05-21 LAB
FERRITIN SERPL-MCNC: 49.4 NG/ML (ref 13–150)
IRON 24H UR-MRATE: 68 MCG/DL (ref 37–145)

## 2024-05-21 PROCEDURE — 83540 ASSAY OF IRON: CPT

## 2024-05-21 PROCEDURE — 36415 COLL VENOUS BLD VENIPUNCTURE: CPT

## 2024-05-21 PROCEDURE — G0463 HOSPITAL OUTPT CLINIC VISIT: HCPCS

## 2024-05-21 PROCEDURE — 82728 ASSAY OF FERRITIN: CPT

## 2024-05-21 RX ORDER — PRAMIPEXOLE DIHYDROCHLORIDE 0.12 MG/1
0.12 TABLET ORAL 2 TIMES DAILY
Qty: 60 TABLET | Refills: 0 | Status: SHIPPED | OUTPATIENT
Start: 2024-05-21 | End: 2024-06-20

## 2024-05-21 NOTE — PROGRESS NOTES
Sleep Consultation    Patient Name: Frances Rao  Age/Sex: 44 y.o. female  : 1980  MRN: [id number]    Date of Encounter Visit: 2024  Encounter Provider: Braeden Do MD  Referring Provider: Maria Guadalupe Francisco PA-C  Place of Service: [facility]  Patient Care Team:  Maria Guadalupe Francisco PA-C as PCP - General (Physician Assistant)    Subjective:     Reason for Consult: Insomnia    History of Present Illness:  Frances Rao is a 44 y.o. female is here for evaluation of insomnia.  Patient is been having insomnia with worsening symptoms with the recent hot flashes.  Patient is already on Flexeril for the muscle pain  and clonidine for the hot flushes . She is no longer on the Venlafaxine   She takes unisome and it helps her go to sleep but can not stay asleep   She is on flexeril but she just got it yesterday to help with back pain   Not a good historian , she has poor sleep schedule, with variable bed time and wake up time.   Last night she went to bed at 1 am, and got out of bed at 7-8 am, she is not sure how much sleep she got in between   Patient complains of daytime fatigue and sleepiness with an Carson Sleepiness Scale (ESS) of 6.  Patient complains of  leg jerking and RLS  Patient denies any cataplexy, sleep paralysis or other symptoms to suggest narcolepsy.  Patient denies any parasomnias.  Denies any history of seizure disorder or recent head trauma.  Patient spends adequate amount of time in bed with improper sleep hygiene     Comorbidities include: Hypertension, smoking,     Review of Systems:   A twelve-system review was conducted and was negative except for the following:  insomnia,         Past Medical History:  Past Medical History:   Diagnosis Date    Abnormal Pap smear of cervix     Anxiety     Fibroid     History of sleep walking     Insomnia     Migraine     Ovarian cyst        Past Surgical History:   Procedure Laterality Date    COLPOSCOPY      D & C  "HYSTEROSCOPY N/A 2/1/2024    Procedure: DILATATION AND CURETTAGE HYSTEROSCOPY;  Surgeon: Afshan Magana DO;  Location: [facility] MAIN OR;  Service: Gynecology;  Laterality: N/A;    WISDOM TOOTH EXTRACTION         Home Medications:     Current Outpatient Medications:     cloNIDine (Catapres) 0.1 MG tablet, Take 1 tablet by mouth Every Night., Disp: 30 tablet, Rfl: 1    cyclobenzaprine (FLEXERIL) 10 MG tablet, Take 1 tablet by mouth Daily As Needed for Muscle Spasms., Disp: 30 tablet, Rfl: 0    doxylamine (UNISOM) 25 MG tablet, Take 1 tablet by mouth At Night As Needed for Sleep., Disp: , Rfl:     pramipexole (Mirapex) 0.125 MG tablet, Take 1 tablet by mouth 2 (Two) Times a Day for 30 days., Disp: 60 tablet, Rfl: 0    Allergies:  No Known Allergies    Past Social History:  Social History     Socioeconomic History    Marital status: Single   Tobacco Use    Smoking status: Every Day     Current packs/day: 0.50     Average packs/day: 0.5 packs/day for 49.8 years (24.9 ttl pk-yrs)     Types: Cigarettes     Start date: 8/12/1996    Smokeless tobacco: Never    Tobacco comments:     Last 1/31/24   Vaping Use    Vaping status: Former    Substances: Nicotine, Flavoring    Devices: Disposable   Substance and Sexual Activity    Alcohol use: Not Currently     Comment: Quit 03/02/23    Drug use: Yes     Types: Marijuana     Comment: last 1/31/24    Sexual activity: Yes     Partners: Male     Birth control/protection: None       Past Family History:  Family History   Problem Relation Age of Onset    COPD Mother     Restless legs syndrome Mother     COPD Father     Insomnia Father     Breast cancer Paternal Great-Grandmother     Breast cancer Maternal Great-Grandmother     Uterine cancer Neg Hx     Ovarian cancer Neg Hx     Colon cancer Neg Hx     Melanoma Neg Hx     Prostate cancer Neg Hx         Objective:        Vital Signs:   Visit Vitals  /60   Pulse 81   Ht 157.5 cm (62.01\")   Wt 73.9 kg (163 lb)   SpO2 97%   BMI 29.80 " kg/m²     Wt Readings from Last 3 Encounters:   05/21/24 73.9 kg (163 lb)   05/20/24 72.6 kg (160 lb)   04/08/24 73.3 kg (161 lb 9.6 oz)     Neck Circumference: 12.5 inches    Physical Exam:   GEN:  No acute distress, alert, cooperative, well developed   EYES:   Sclerae clear. No icterus. PERRL. Normal EOM  ENT:   External ears/nose normal, no oral lesions, no thrush, mucous membranes moist, Septum midline. Mallampati 3 airway.  Enlarged uvula, pierced tongue   NECK:  Supple, midline trachea, no JVD  LUNGS: Normal chest on inspection, CTAB, no wheezes. No rhonchi. No crackles. Respirations regular, even and unlabored.   CV:  Regular rhythm and rate. Normal S1/S2. No murmurs, gallops, or rubs noted.  ABD:  Soft, nontender and nondistended. Normal bowel sounds. No guarding  EXT:  Moves all extremities well. No cyanosis. No redness. No edema.   Skin: Dry, intact, no bleeding      Diagnostic Data:  No prior sleep study on records  No prior iron study on record    Assessment and Plan:       ICD-10-CM ICD-9-CM   1. Restless legs syndrome (RLS)  G25.81 333.94   2. Insomnia due to medical condition  G47.01 327.01   3. Cigarette nicotine dependence without complication  F17.210 305.1       Recommendations:     Patient was educated in depth about restless leg syndrome and the association with paretic leg movement disorder  We did discuss the impact on sleep quality and the potential benefit from the treatment  The insomnia can be secondary to restless leg syndrome or can be another independent condition and will address accordingly depending on the residual symptoms after treating the restless leg  Patient is no longer on any antidepressant and no medication on her list is to be considered as a possible factor  We did discuss the effect of caffeine and nicotine on the restless leg  We did discuss the effect of nicotine on the sleep onset specially if she smokes before bedtime  We did discuss the proper sleep hygiene  We did  discuss the treatment option versus like starting with the pramipexole, will start with 0.125 mg with education on how to titrate the dose gradually over up to 0.5 mg depending on the symptoms  We will check iron level and we will replace any iron deficiency with the goal to keep iron and ferritin level above 50  We will consider gabapentin if no better on the pramipexole  We might even consider clonazepam if she fails all the others  Will follow-up in 4 weeks for further recommendations, no specific sleep testing is recommended at this point, her history is not suggestive of obstructive sleep apnea    Orders Placed This Encounter   Procedures    Ferritin Level    Iron     New Medications Ordered This Visit   Medications    pramipexole (Mirapex) 0.125 MG tablet     Sig: Take 1 tablet by mouth 2 (Two) Times a Day for 30 days.     Dispense:  60 tablet     Refill:  0      Return in about 4 weeks (around 6/18/2024).    Braeden Do MD   [facility]   05/21/24  16:19 EDT    Dictated utilizing Dragon dictation

## 2024-06-19 ENCOUNTER — OFFICE VISIT (OUTPATIENT)
Dept: SLEEP MEDICINE | Facility: HOSPITAL | Age: 44
End: 2024-06-19
Payer: COMMERCIAL

## 2024-06-19 VITALS
OXYGEN SATURATION: 99 % | BODY MASS INDEX: 30.05 KG/M2 | HEIGHT: 62 IN | SYSTOLIC BLOOD PRESSURE: 112 MMHG | HEART RATE: 82 BPM | DIASTOLIC BLOOD PRESSURE: 83 MMHG | WEIGHT: 163.3 LBS

## 2024-06-19 DIAGNOSIS — G47.01 INSOMNIA DUE TO MEDICAL CONDITION: ICD-10-CM

## 2024-06-19 DIAGNOSIS — G25.81 RESTLESS LEGS SYNDROME (RLS): Primary | ICD-10-CM

## 2024-06-19 PROCEDURE — G0463 HOSPITAL OUTPT CLINIC VISIT: HCPCS

## 2024-06-19 RX ORDER — CLONIDINE HYDROCHLORIDE 0.1 MG/1
0.1 TABLET ORAL NIGHTLY
Qty: 30 TABLET | Refills: 1 | Status: SHIPPED | OUTPATIENT
Start: 2024-06-19 | End: 2024-06-20 | Stop reason: SDUPTHER

## 2024-06-19 RX ORDER — PRAMIPEXOLE DIHYDROCHLORIDE 0.12 MG/1
0.12 TABLET ORAL NIGHTLY
Qty: 30 TABLET | Refills: 11 | Status: SHIPPED | OUTPATIENT
Start: 2024-06-19 | End: 2025-06-19

## 2024-06-19 NOTE — PROGRESS NOTES
Sleep Disorder Follow Up    Patient Name: Frances Rao  Age/Sex: 44 y.o. female  : 1980  MRN: [id number]    Date of Encounter Visit: 24   Referring Provider: Maria Guadalupe Francisco PA-C  Place of Service: [facility]  Patient Care Team:  Maria Guadalupe Francisco PA-C as PCP - General (Physician Assistant)    PROBLEM LIST:  Restless leg syndrome  Insomnia    History of Present Illness:  Frances Rao is a 44 y.o. female who is here for follow up on restless leg syndrome. Patient was last seen in the office on 2024.  Since last visit, Patient was started on pramipexole and had a blood test for iron and ferritin level .  Iron level came back normal at 68, ferritin level was normal at 50  Jean Sleepiness Scale (ESS) is 9.  Compliance download was reviewed and documented below.  Weight is unchanged from last visit since last visit.  Other comorbidities include hypertension and history of smoking  Patient reported consuming 5 carbonated caffeinated beverages per day    She started the pramipexole at 0.125 mg and it is working   She denies any side effect, no residual RLS symptoms   Improved sleep onset and overall sleep quality     Review of Systems:   A ten-system review was conducted and was negative except for the following: Acid reflux/heartburn and anxiety.        Past Medical History:  Past medical, surgical, social, and family history, except as mentioned above, was unchanged from the last visit.     Past Medical History:   Diagnosis Date    Abnormal Pap smear of cervix     Anxiety     Fibroid     History of sleep walking     Insomnia     Migraine     Ovarian cyst        Past Surgical History:   Procedure Laterality Date    COLPOSCOPY      D & C HYSTEROSCOPY N/A 2024    Procedure: DILATATION AND CURETTAGE HYSTEROSCOPY;  Surgeon: Afshan Magana DO;  Location: [facility] MAIN OR;  Service: Gynecology;  Laterality: N/A;    WISDOM TOOTH EXTRACTION         Home Medications:  "    Current Outpatient Medications:     cloNIDine (Catapres) 0.1 MG tablet, Take 1 tablet by mouth Every Night., Disp: 30 tablet, Rfl: 1    cyclobenzaprine (FLEXERIL) 10 MG tablet, Take 1 tablet by mouth Daily As Needed for Muscle Spasms., Disp: 30 tablet, Rfl: 0    doxylamine (UNISOM) 25 MG tablet, Take 1 tablet by mouth At Night As Needed for Sleep., Disp: , Rfl:     pramipexole (Mirapex) 0.125 MG tablet, Take 1 tablet by mouth Every Night., Disp: 30 tablet, Rfl: 11    Allergies:  No Known Allergies    Past Social History:  Social History     Socioeconomic History    Marital status: Single   Tobacco Use    Smoking status: Every Day     Current packs/day: 0.50     Average packs/day: 0.5 packs/day for 49.9 years (24.9 ttl pk-yrs)     Types: Cigarettes     Start date: 8/12/1996    Smokeless tobacco: Never    Tobacco comments:     Last 1/31/24   Vaping Use    Vaping status: Former    Substances: Nicotine, Flavoring    Devices: Disposable   Substance and Sexual Activity    Alcohol use: Not Currently     Comment: Quit 03/02/23    Drug use: Yes     Types: Marijuana     Comment: last 1/31/24    Sexual activity: Yes     Partners: Male     Birth control/protection: None       Past Family History:  Family History   Problem Relation Age of Onset    COPD Mother     Restless legs syndrome Mother     COPD Father     Insomnia Father     Breast cancer Paternal Great-Grandmother     Breast cancer Maternal Great-Grandmother     Uterine cancer Neg Hx     Ovarian cancer Neg Hx     Colon cancer Neg Hx     Melanoma Neg Hx     Prostate cancer Neg Hx          Objective:        Vital Signs:   Visit Vitals  /83   Pulse 82   Ht 157.5 cm (62.01\")   Wt 74.1 kg (163 lb 4.8 oz)   SpO2 99%   BMI 29.86 kg/m²     Wt Readings from Last 3 Encounters:   06/19/24 74.1 kg (163 lb 4.8 oz)   05/21/24 73.9 kg (163 lb)   05/20/24 72.6 kg (160 lb)          Physical Exam:   GEN:  No acute distress, alert, cooperative, well developed   EYES:   Sclerae " clear. No icterus. PERRL. Normal EOM  ENT:   External ears/nose normal, no oral lesions, no thrush, mucous membranes moist, Septum midline. Mallampati 3 airway.  Enlarged uvula, pierced tongue   NECK:  Supple, midline trachea, no JVD  LUNGS: Normal chest on inspection, CTAB, no wheezes. No rhonchi. No crackles. Respirations regular, even and unlabored.   CV:  Regular rhythm and rate. Normal S1/S2. No murmurs, gallops, or rubs noted.  ABD:  Soft, nontender and nondistended. Normal bowel sounds. No guarding  EXT:  Moves all extremities well. No cyanosis. No redness. No edema.   Skin: Dry, intact, no bleeding    Diagnostic Data:   Latest Reference Range & Units Most Recent   Iron 37 - 145 mcg/dL 68  5/21/24 16:44   Ferritin 13.00 - 150.00 ng/mL 49.40  5/21/24 16:44           Assessment and Plan:       ICD-10-CM ICD-9-CM   1. Restless legs syndrome (RLS)  G25.81 333.94   2. Insomnia due to medical condition  G47.01 327.01       Recommendations:     Patient is responding very well to the pramipexole at the minimum dose of 0.125 mg per night  No sign of any augmentation symptoms  No unwanted side effect  No problem with impulse control  At this point I see no contraindication to continue with that medication at the same dose and we will follow-up once a year  We did discuss again the potential augmentation syndrome with dose medication and as long as she does not have that problem we should be safe to continue with it for the long term  Iron studies were normal and there is no room for iron supplementation in this case    No orders of the defined types were placed in this encounter.    New Medications Ordered This Visit   Medications    pramipexole (Mirapex) 0.125 MG tablet     Sig: Take 1 tablet by mouth Every Night.     Dispense:  30 tablet     Refill:  11     Return in about 1 year (around 6/19/2025).    Braeden Do MD   [facility]   06/19/24  09:42 EDT    Dictated utilizing AirPOS

## 2024-06-20 ENCOUNTER — OFFICE VISIT (OUTPATIENT)
Dept: INTERNAL MEDICINE | Facility: CLINIC | Age: 44
End: 2024-06-20
Payer: COMMERCIAL

## 2024-06-20 VITALS
BODY MASS INDEX: 29.81 KG/M2 | DIASTOLIC BLOOD PRESSURE: 78 MMHG | HEIGHT: 62 IN | WEIGHT: 162 LBS | TEMPERATURE: 98.1 F | OXYGEN SATURATION: 95 % | RESPIRATION RATE: 16 BRPM | SYSTOLIC BLOOD PRESSURE: 110 MMHG | HEART RATE: 87 BPM

## 2024-06-20 DIAGNOSIS — G25.81 RESTLESS LEGS SYNDROME (RLS): ICD-10-CM

## 2024-06-20 DIAGNOSIS — R23.2 HOT FLASHES: Primary | ICD-10-CM

## 2024-06-20 DIAGNOSIS — M54.50 CHRONIC LOW BACK PAIN WITHOUT SCIATICA, UNSPECIFIED BACK PAIN LATERALITY: ICD-10-CM

## 2024-06-20 DIAGNOSIS — D25.9 UTERINE LEIOMYOMA, UNSPECIFIED LOCATION: ICD-10-CM

## 2024-06-20 DIAGNOSIS — G89.29 CHRONIC LOW BACK PAIN WITHOUT SCIATICA, UNSPECIFIED BACK PAIN LATERALITY: ICD-10-CM

## 2024-06-20 PROCEDURE — 99214 OFFICE O/P EST MOD 30 MIN: CPT | Performed by: PHYSICIAN ASSISTANT

## 2024-06-20 PROCEDURE — 1160F RVW MEDS BY RX/DR IN RCRD: CPT | Performed by: PHYSICIAN ASSISTANT

## 2024-06-20 PROCEDURE — 1126F AMNT PAIN NOTED NONE PRSNT: CPT | Performed by: PHYSICIAN ASSISTANT

## 2024-06-20 PROCEDURE — 1159F MED LIST DOCD IN RCRD: CPT | Performed by: PHYSICIAN ASSISTANT

## 2024-06-20 RX ORDER — CLONIDINE HYDROCHLORIDE 0.1 MG/1
0.1 TABLET ORAL NIGHTLY
Qty: 30 TABLET | Refills: 1 | Status: SHIPPED | OUTPATIENT
Start: 2024-06-20

## 2024-06-20 NOTE — PROGRESS NOTES
Chief Complaint  Hot Flashes (i), Insomnia, and Back Pain    Subjective          Frances Rao presents to [facility]  History of Present Illness  Patient recently had sleep study.  Diagnosed with restless leg syndrome.  Patient was started on pramipexole 0.125 milligrams and has been doing well.    Hot flashes: Has been taking clonidine and feels they are helping more than Lexapro.  States she has still been having hot flashes at times but definitely improved.    Back pain: did not change with Flexoril   States GYN told her that symptoms would likely improve after surgery next month.  Declines x-rays or further workup until after surgery.    Past Medical History:   Diagnosis Date    Abnormal Pap smear of cervix     Anxiety     Fibroid     History of sleep walking     Insomnia     Migraine     Ovarian cyst         Past Surgical History:   Procedure Laterality Date    COLPOSCOPY      D & C HYSTEROSCOPY N/A 2/1/2024    Procedure: DILATATION AND CURETTAGE HYSTEROSCOPY;  Surgeon: Afshan Magana DO;  Location: [facility] OR;  Service: Gynecology;  Laterality: N/A;    WISDOM TOOTH EXTRACTION          Current Outpatient Medications on File Prior to Visit   Medication Sig Dispense Refill    doxylamine (UNISOM) 25 MG tablet Take 1 tablet by mouth At Night As Needed for Sleep.      pramipexole (Mirapex) 0.125 MG tablet Take 1 tablet by mouth Every Night. 30 tablet 11    [DISCONTINUED] cloNIDine (CATAPRES) 0.1 MG tablet TAKE ONE TABLET BY MOUTH ONCE NIGHTLY 30 tablet 1    [DISCONTINUED] cyclobenzaprine (FLEXERIL) 10 MG tablet Take 1 tablet by mouth Daily As Needed for Muscle Spasms. 30 tablet 0     No current facility-administered medications on file prior to visit.        No Known Allergies    Social History     Tobacco Use   Smoking Status Every Day    Current packs/day: 0.50    Average packs/day: 0.5 packs/day for 49.9 years (24.9 ttl pk-yrs)    Types: Cigarettes    Start  "date: 8/12/1996   Smokeless Tobacco Never   Tobacco Comments    Last 1/31/24          Objective   Vital Signs:   /78 (BP Location: Left arm, Patient Position: Sitting, Cuff Size: Adult)   Pulse 87   Temp 98.1 °F (36.7 °C) (Temporal)   Resp 16   Ht 157.5 cm (62.01\")   Wt 73.5 kg (162 lb)   SpO2 95%   BMI 29.62 kg/m²     Physical Exam  Vitals reviewed.   Constitutional:       Appearance: Normal appearance.   HENT:      Head: Normocephalic and atraumatic.      Nose: Nose normal.      Mouth/Throat:      Mouth: Mucous membranes are moist.   Eyes:      Extraocular Movements: Extraocular movements intact.      Conjunctiva/sclera: Conjunctivae normal.      Pupils: Pupils are equal, round, and reactive to light.   Cardiovascular:      Rate and Rhythm: Normal rate and regular rhythm.   Pulmonary:      Effort: Pulmonary effort is normal.      Breath sounds: Normal breath sounds.   Abdominal:      General: Abdomen is flat. Bowel sounds are normal.      Palpations: Abdomen is soft.   Musculoskeletal:         General: Normal range of motion.   Neurological:      General: No focal deficit present.      Mental Status: She is alert and oriented to person, place, and time.   Psychiatric:         Mood and Affect: Mood normal.        Result Review :   The following data was reviewed by: Maria Guadalupe Francisco PA-C on 06/20/2024:  Common labs          2/1/2024    07:06 4/8/2024    12:42   Common Labs   Glucose  91    BUN  11    Creatinine  0.80    Sodium  142    Potassium  4.5    Chloride  106    Calcium  9.8    Albumin  4.7    Total Bilirubin  0.3    Alkaline Phosphatase  81    AST (SGOT)  14    ALT (SGPT)  19    WBC 7.54  6.43    Hemoglobin 14.8  14.3    Hematocrit 44.2  42.6    Platelets 215  205    Total Cholesterol  192    Triglycerides  80    HDL Cholesterol  41    LDL Cholesterol   136                           Assessment and Plan    Diagnoses and all orders for this visit:    1. Hot flashes (Primary)  Comments:  Symptoms " have improved clonidine.  Will continue at this time and monitor at follow-up.    2. Restless legs syndrome (RLS)  Comments:  Reviewed sleep medicine's note, continue medication as prescribed.  Symptoms improved    3. Chronic low back pain without sciatica, unspecified back pain laterality  Comments:  Discussed DDx.  Offered x-rays, patient declined.  Will follow-up after surgery to see if symptoms are persisting or if they resolve with removal of fibroids    4. Uterine leiomyoma, unspecified location  Comments:  Follow-up with GYN as discussed for surgery next month    Other orders  -     cloNIDine (CATAPRES) 0.1 MG tablet; Take 1 tablet by mouth Every Night.  Dispense: 30 tablet; Refill: 1        Follow Up   Return in about 6 weeks (around 8/1/2024).  Patient was given instructions and counseling regarding her condition or for health maintenance advice. Please see specific information pulled into the AVS if appropriate.

## 2024-06-20 NOTE — PROGRESS NOTES
I have reviewed the notes, assessments, and/or procedures performed by Maria Guadalupe Francisco PA-C, I concur with her documentation of Frances Rao.

## 2024-06-24 ENCOUNTER — PATIENT MESSAGE (OUTPATIENT)
Dept: INTERNAL MEDICINE | Facility: CLINIC | Age: 44
End: 2024-06-24
Payer: COMMERCIAL

## 2024-06-24 RX ORDER — CYCLOBENZAPRINE HCL 10 MG
10 TABLET ORAL DAILY PRN
Qty: 30 TABLET | Refills: 0 | Status: SHIPPED | OUTPATIENT
Start: 2024-06-24

## 2024-06-24 NOTE — TELEPHONE ENCOUNTER
From: Frances Rao  To: Maria Guadalupe Francisco  Sent: 6/24/2024 9:48 AM EDT  Subject: Muscle relaxer     Is there any way I can get more muscle relaxers? They did help some in the morning. Didn't notice til I didn't have anymore.

## 2024-07-10 PROBLEM — D25.9 UTERINE LEIOMYOMA: Status: ACTIVE | Noted: 2024-05-07

## 2024-07-10 PROBLEM — N93.9 ABNORMAL UTERINE BLEEDING (AUB): Status: ACTIVE | Noted: 2024-05-07

## 2024-07-12 RX ORDER — CYCLOBENZAPRINE HCL 10 MG
10 TABLET ORAL DAILY PRN
Qty: 30 TABLET | Refills: 0 | OUTPATIENT
Start: 2024-07-12

## 2024-07-22 ENCOUNTER — OFFICE VISIT (OUTPATIENT)
Dept: INTERNAL MEDICINE | Facility: CLINIC | Age: 44
End: 2024-07-22
Payer: COMMERCIAL

## 2024-07-22 VITALS
RESPIRATION RATE: 18 BRPM | OXYGEN SATURATION: 97 % | HEIGHT: 62 IN | SYSTOLIC BLOOD PRESSURE: 120 MMHG | WEIGHT: 160.2 LBS | DIASTOLIC BLOOD PRESSURE: 82 MMHG | HEART RATE: 75 BPM | BODY MASS INDEX: 29.48 KG/M2

## 2024-07-22 DIAGNOSIS — J06.9 ACUTE URI: ICD-10-CM

## 2024-07-22 DIAGNOSIS — R05.1 ACUTE COUGH: Primary | ICD-10-CM

## 2024-07-22 PROCEDURE — 1126F AMNT PAIN NOTED NONE PRSNT: CPT | Performed by: NURSE PRACTITIONER

## 2024-07-22 PROCEDURE — 99213 OFFICE O/P EST LOW 20 MIN: CPT | Performed by: NURSE PRACTITIONER

## 2024-07-22 NOTE — PROGRESS NOTES
"Chief Complaint  URI, Sinus Problem (Sinus pressure ), and Nasal Congestion (45 y/o female is here today c/o nasal congestion, cough, chest congestion x 1 week. Patient stated she has not tried anything OTC.)    Subjective          Frances Rao presents to [facility]  History of Present Illness  She reports onset of sinus pressure, rhinorrhea, congestion and cough x7 days  Denies fever  She reports no change in soa from baseline  She reports that shortness of air began when she had all her weight gain.  She denies wheezing, orthopnea  Not taking  any otc meds  Eating and drinking well   Does not feel bad but has hysterectomy scheduled next Friday   with similar sx last week. Negative testing for covid, flu  Objective   Vital Signs:   /82 (BP Location: Left arm, Patient Position: Sitting, Cuff Size: Adult)   Pulse 75   Resp 18   Ht 157.5 cm (62.01\")   Wt 72.7 kg (160 lb 3.2 oz)   SpO2 97%   BMI 29.29 kg/m²     Physical Exam  Vitals and nursing note reviewed.   Constitutional:       General: She is not in acute distress.     Appearance: Normal appearance.   HENT:      Head: Normocephalic and atraumatic.      Right Ear: External ear normal.      Left Ear: External ear normal.      Nose: Nose normal.      Mouth/Throat:      Mouth: Mucous membranes are moist.   Eyes:      Conjunctiva/sclera: Conjunctivae normal.   Cardiovascular:      Rate and Rhythm: Normal rate and regular rhythm.      Pulses: Normal pulses.      Heart sounds: Normal heart sounds. No murmur heard.     No friction rub. No gallop.   Pulmonary:      Effort: Pulmonary effort is normal. No respiratory distress.      Breath sounds: Wheezing (LLL intially, cleared with cough) present. No rhonchi or rales.   Musculoskeletal:      Cervical back: Neck supple.      Right lower leg: No edema.      Left lower leg: No edema.   Skin:     General: Skin is warm and dry.   Neurological:      General: " No focal deficit present.      Mental Status: She is alert and oriented to person, place, and time.   Psychiatric:         Mood and Affect: Mood normal.         Behavior: Behavior normal.        Result Review :          Procedures      Assessment and Plan    Diagnoses and all orders for this visit:    1. Acute cough (Primary)  -     XR Chest PA & Lateral (In Office)    2. Acute URI    Discussed suspicion for viral etiology.  She declined testing today for flu, COVID, strep given has been with similar symptoms and negative testing for him.  Also outside the window where she may test positive.  Discussed course and supportive care for viral illness.  Chest x-ray today given complaint of shortness of breath and wheeze that cleared with cough.  Patient will call with new or worsening symptoms for further eval.           Follow Up   No follow-ups on file.  Patient was given instructions and counseling regarding her condition or for health maintenance advice. Please see specific information pulled into the AVS if appropriate.

## 2024-07-26 ENCOUNTER — TELEPHONE (OUTPATIENT)
Dept: INTERNAL MEDICINE | Facility: CLINIC | Age: 44
End: 2024-07-26
Payer: COMMERCIAL

## 2024-07-26 RX ORDER — CYCLOBENZAPRINE HCL 10 MG
10 TABLET ORAL DAILY PRN
Qty: 30 TABLET | Refills: 0 | Status: SHIPPED | OUTPATIENT
Start: 2024-07-26

## 2024-07-26 NOTE — TELEPHONE ENCOUNTER
Caller: Maira Francesulysses Gama    Relationship: Self    Best call back number: [phone]    Requested Prescriptions:     cyclobenzaprine (FLEXERIL) 10 MG tablet     Pharmacy where request should be sent: [facility] [id number] - [place] - [address] AT [facility] [address] - [phone] [facility] [phone]      Last office visit with prescribing clinician: 6/20/2024   Last telemedicine visit with prescribing clinician: Visit date not found   Next office visit with prescribing clinician: 8/1/2024     Additional details provided by patient: OUT OF MEDICATION     Does the patient have less than a 3 day supply:  [x] Yes  [] No    Would you like a call back once the refill request has been completed: [] Yes [] No    If the office needs to give you a call back, can they leave a voicemail: [] Yes [] No    Slim Yan   07/26/24 11:06 EDT

## 2024-07-31 NOTE — PRE-PROCEDURE INSTRUCTIONS
PATIENT INSTRUCTED TO BE:    - NOTHING TO EAT AFTER MIDNIGHT OR CHEW, EXCEPT CAN HAVE CLEAR LIQUIDS 2 HOURS PRIOR TO SURGERY ARRIVAL TIME , NO MORE THAN 8 OZ. (NOTHING RED)     - TO HOLD ALL VITAMINS, SUPPLEMENTS, NSAIDS FOR ONE WEEK PRIOR TO THEIR SURGICAL PROCEDURE     - INSTRUCTED PT TO USE SURGICAL SOAP 1 TIME THE NIGHT PRIOR TO SURGERY ____8/1_______ OR THE AM OF SURGERY _____8/2________   USE THE SOAP FROM NECK TO TOES, AVOID THEIR FACE, HAIR, AND PRIVATE PARTS. IF USE THE SOAP THE NIGHT PRIOR TO SURGERY, CHANGE BED LINENS AND NO PETS IN THE BED.     INSTRUCTED NO LOTIONS, JEWELRY, PIERCINGS,  NAIL POLISH, OR DEODORANT DAY OF SURGERY    - IF DIABETIC, CHECK BLOOD GLUCOSE IF LESS THAN 70 OR HAVING SYMPTOMS CALL THE PREOP AREA FOR INSTRUCTIONS ON AM OF SURGERY ([phone]    -INSTRUCTED TO TAKE THE FOLLOWING MEDICATIONS THE DAY OF SURGERY WITH SIPS OF WATER: NONE    - DO NOT BRING ANY MEDICATIONS WITH YOU TO THE HOSPITAL THE DAY OF SURGERY, EXCEPT IF USE INHALERS. BRING INHALERS DAY OF SURGERY       - BRING CPAP OR BIPAP TO THE HOSPITAL ONLY IF YOU ARE SPENDING THE NIGHT    - DO NOT SMOKE OR VAPE 24 HOURS PRIOR TO PROCEDURE PER ANESTHESIA REQUEST     -MAKE SURE YOU HAVE A RIDE HOME OR SOMEONE TO STAY WITH YOU THE DAY OF THE PROCEDURE AFTER YOU GO HOME     - FOLLOW ANY OTHER INSTRUCTIONS GIVEN TO YOU BY YOUR SURGEON'S OFFICE.     - DAY OF SURGERY ____8/2________, COME TO ELEVATOR A, THIRD FLOOR, CHECK IN AT THE DESK FOR REGISTRATION/SURGERY     - YOU WILL RECEIVE A PHONE CALL THE DAY PRIOR TO SURGERY BETWEEN 1PM AND 4 PM WITH ARRIVAL TIME, IF YOUR SURGERY IS ON A MONDAY YOU WILL RECEIVE A CALL THE FRIDAY PRIOR TO SURGERY DATE    - BRING CASH OR CREDIT CARD FOR COPAYMENT OF MEDICATIONS AFTER SURGERY IF YOU USE THE HOSPITAL PHARMACY (MEDS TO BED)    - PREADMISSION TESTING NURSE JOMAR JOYCE [phone] IF HAVE ANY QUESTIONS     -PATIENT PROVIDED THE NUMBER FOR PREOP SURGICAL DEPT IF HAD QUESTIONS AFTER HOURS PRIOR  TO SURGERY ([phone]   INFORMED PT IF NO ANSWER, LEAVE A MESSAGE AND SOMEONE WILL RETURN THEIR CALL       PATIENT VERBALIZED UNDERSTANDING       Clear Liquid Diet        Find out when you need to start a clear liquid diet.   Think of “clear liquids” as anything you could read a newspaper through. This includes things like water, broth, sports drinks, or tea WITHOUT any kind of milk or cream.           Once you are told to start a clear liquid diet, only drink these things until 2 hours before arrival to the hospital or when the hospital says to stop. Total volume limitation: 8 oz.       Clear liquids you CAN drink:   Water   Clear broth: beef, chicken, vegetable, or bone broth with nothing in it   Gatorade   Lemonade or Justin-aid   Soda   Tea, coffee (NO cream or honey)   Jell-O (without fruit)   Popsicles (without fruit or cream)   Italian ices   Juice without pulp: apple, white, grape   You may use salt, pepper, and sugar  NO RED  NO NOODLES    Do NOT drink:   Milk or cream   Soy milk, almond milk, coconut milk, or other non-dairy drinks and   creamers   Milkshakes or smoothies   Tomato juice   Orange juice   Grapefruit juice   Cream soups or any other than broth         Clear Liquid Diet:  Do NOT eat any solid food.  Do NOT eat or suck on mints or candy.  Do NOT chew gum.  Do NOT drink thick liquids like milk or juice with pulp in it.  Do NOT add milk, cream, or anything like soy milk or almond milk to coffee or tea.

## 2024-08-01 ENCOUNTER — OFFICE VISIT (OUTPATIENT)
Dept: INTERNAL MEDICINE | Facility: CLINIC | Age: 44
End: 2024-08-01
Payer: COMMERCIAL

## 2024-08-01 VITALS
SYSTOLIC BLOOD PRESSURE: 122 MMHG | TEMPERATURE: 99.2 F | HEIGHT: 62 IN | HEART RATE: 90 BPM | OXYGEN SATURATION: 98 % | BODY MASS INDEX: 29.3 KG/M2 | WEIGHT: 159.2 LBS | DIASTOLIC BLOOD PRESSURE: 68 MMHG | RESPIRATION RATE: 18 BRPM

## 2024-08-01 DIAGNOSIS — R23.2 HOT FLASHES: Primary | ICD-10-CM

## 2024-08-01 DIAGNOSIS — M54.50 ACUTE BILATERAL LOW BACK PAIN WITHOUT SCIATICA: ICD-10-CM

## 2024-08-01 PROCEDURE — 99213 OFFICE O/P EST LOW 20 MIN: CPT | Performed by: PHYSICIAN ASSISTANT

## 2024-08-01 PROCEDURE — 1160F RVW MEDS BY RX/DR IN RCRD: CPT | Performed by: PHYSICIAN ASSISTANT

## 2024-08-01 PROCEDURE — 1126F AMNT PAIN NOTED NONE PRSNT: CPT | Performed by: PHYSICIAN ASSISTANT

## 2024-08-01 PROCEDURE — 1159F MED LIST DOCD IN RCRD: CPT | Performed by: PHYSICIAN ASSISTANT

## 2024-08-01 RX ORDER — CLONIDINE HYDROCHLORIDE 0.1 MG/1
0.1 TABLET ORAL NIGHTLY
Qty: 90 TABLET | Refills: 1 | Status: SHIPPED | OUTPATIENT
Start: 2024-08-01

## 2024-08-01 NOTE — PROGRESS NOTES
"Chief Complaint  Follow-up (F/u on medication for hot flashes.)    Subjective          Frances Rao presents to [facility]  History of Present Illness  LBP: pt states she was moving her inlaws so she had flare of lbp   She was taking 2 muscle relaxer/day for a period for a period of time   Hot flashes: Pt states hot flashes have improved with clonidine  She is scheduled for hysterectomy tomorrow with GYN    Past Medical History:   Diagnosis Date    Abnormal Pap smear of cervix     Abnormal uterine bleeding (AUB)     Anxiety     Fibroid     History of sleep walking     Insomnia     Migraine     Ovarian cyst         Past Surgical History:   Procedure Laterality Date    COLPOSCOPY      D & C HYSTEROSCOPY N/A 2/1/2024    Procedure: DILATATION AND CURETTAGE HYSTEROSCOPY;  Surgeon: Afshan Magana DO;  Location: [facility] MAIN OR;  Service: Gynecology;  Laterality: N/A;    WISDOM TOOTH EXTRACTION          Current Outpatient Medications on File Prior to Visit   Medication Sig Dispense Refill    cyclobenzaprine (FLEXERIL) 10 MG tablet Take 1 tablet by mouth Daily As Needed for Muscle Spasms. (Patient taking differently: Take 1 tablet by mouth At Night As Needed for Muscle Spasms.) 30 tablet 0    pramipexole (Mirapex) 0.125 MG tablet Take 1 tablet by mouth Every Night. 30 tablet 11     No current facility-administered medications on file prior to visit.        No Known Allergies    Social History     Tobacco Use   Smoking Status Every Day    Current packs/day: 0.50    Average packs/day: 0.5 packs/day for 50.0 years (25.0 ttl pk-yrs)    Types: Cigarettes    Start date: 8/12/1996   Smokeless Tobacco Never   Tobacco Comments    Last 1/31/24          Objective   Vital Signs:   /68 (BP Location: Right arm, Patient Position: Lying, Cuff Size: Adult)   Pulse 90   Temp 99.2 °F (37.3 °C) (Temporal)   Resp 18   Ht 157.5 cm (62.01\")   Wt 72.2 kg (159 lb 3.2 oz)   SpO2 98%   " BMI 29.11 kg/m²     Physical Exam  Vitals reviewed.   Constitutional:       Appearance: Normal appearance.   HENT:      Head: Normocephalic and atraumatic.      Nose: Nose normal.      Mouth/Throat:      Mouth: Mucous membranes are moist.   Eyes:      Extraocular Movements: Extraocular movements intact.      Conjunctiva/sclera: Conjunctivae normal.      Pupils: Pupils are equal, round, and reactive to light.   Cardiovascular:      Rate and Rhythm: Normal rate and regular rhythm.   Pulmonary:      Effort: Pulmonary effort is normal.      Breath sounds: Normal breath sounds.   Abdominal:      General: Abdomen is flat. Bowel sounds are normal.      Palpations: Abdomen is soft.   Musculoskeletal:         General: Normal range of motion.   Neurological:      General: No focal deficit present.      Mental Status: She is alert and oriented to person, place, and time.   Psychiatric:         Mood and Affect: Mood normal.        Result Review :                            Assessment and Plan    Diagnoses and all orders for this visit:    1. Hot flashes (Primary)  Comments:  improved, cont current medicine at this time.    2. Acute bilateral low back pain without sciatica  Comments:  Cont muscle relaxer prn    Other orders  -     cloNIDine (CATAPRES) 0.1 MG tablet; Take 1 tablet by mouth Every Night.  Dispense: 90 tablet; Refill: 1        Follow Up   Return in about 3 months (around 11/1/2024).  Patient was given instructions and counseling regarding her condition or for health maintenance advice. Please see specific information pulled into the AVS if appropriate.

## 2024-08-02 ENCOUNTER — TELEPHONE (OUTPATIENT)
Dept: OBSTETRICS AND GYNECOLOGY | Facility: CLINIC | Age: 44
End: 2024-08-02
Payer: COMMERCIAL

## 2024-08-02 NOTE — TELEPHONE ENCOUNTER
LVM - Trying to reach pt - was supposed to have surgery 8/2 but will need to reschedule  [phone] option 3

## 2024-08-05 ENCOUNTER — TELEPHONE (OUTPATIENT)
Dept: SLEEP MEDICINE | Facility: HOSPITAL | Age: 44
End: 2024-08-05
Payer: COMMERCIAL

## 2024-08-05 NOTE — TELEPHONE ENCOUNTER
Patient reports she is taking 2 pills per day of Mirpaex now due to symptom increase. [facility] states they need new prescription for the change. Could you please send this in? Patient has enough pills to last for this week only.

## 2024-08-06 RX ORDER — PRAMIPEXOLE DIHYDROCHLORIDE 0.25 MG/1
0.25 TABLET ORAL NIGHTLY
Qty: 30 TABLET | Refills: 11 | Status: SHIPPED | OUTPATIENT
Start: 2024-08-06 | End: 2025-08-06

## 2024-08-08 ENCOUNTER — ANESTHESIA EVENT (OUTPATIENT)
Dept: PERIOP | Facility: HOSPITAL | Age: 44
End: 2024-08-08
Payer: COMMERCIAL

## 2024-08-08 NOTE — H&P
GYN HISTORY & PHYSICAL      Patient Name: Frances Rao  : 1980  MRN: [id number]    Subjective       HPI:  44 y.o.  No LMP recorded.. Patient here today to undergo RALH with BS and cystoscopy due to enlarged, fibroid uterus and AUB.   She wishes to proceed with the above procedure. She denies any F/C, D/C, N/V, CP or SOB.     Risks and benefits and alternatives of surgery were discussed with patient.  Risks are not limited to anesthesia, bleeding, blood transfusion, infection, damage to surrounding organs, wound separation, re-operation, thromboembolic disease, death.            ROS: Review of Systems   Constitutional: Negative.    HENT: Negative.     Eyes: Negative.    Respiratory: Negative.     Cardiovascular: Negative.    Gastrointestinal: Negative.    Endocrine: Negative.    Genitourinary:  Positive for menstrual problem, pelvic pain and vaginal bleeding.   Musculoskeletal: Negative.    Skin: Negative.    Allergic/Immunologic: Negative.    Neurological: Negative.    Hematological: Negative.    Psychiatric/Behavioral: Negative.           Personal History     PMHx:    Past Medical History:   Diagnosis Date    Abnormal Pap smear of cervix     Abnormal uterine bleeding (AUB)     Anxiety     Fibroid     History of sleep walking     Insomnia     Migraine     Ovarian cyst        OBHx:   OB History    Para Term  AB Living   0 0 0 0 0 0   SAB IAB Ectopic Molar Multiple Live Births   0 0 0 0 0 0        Home Medications:  cloNIDine, cyclobenzaprine, pramipexole, and venlafaxine    Allergies:  No Known Allergies    PSHx:   Past Surgical History:   Procedure Laterality Date    COLPOSCOPY      D & C HYSTEROSCOPY N/A 2024    Procedure: DILATATION AND CURETTAGE HYSTEROSCOPY;  Surgeon: Afshan Magana DO;  Location: [facility] MAIN OR;  Service: Gynecology;  Laterality: N/A;    WISDOM TOOTH EXTRACTION         Social History:    reports that she has been smoking cigarettes. She started smoking  about 28 years ago. She has a 25 pack-year smoking history. She has never used smokeless tobacco. She reports that she does not currently use alcohol. She reports that she does not currently use drugs after having used the following drugs: Marijuana.      Objective     Vitals:   Temp:  [98.2 °F (36.8 °C)] 98.2 °F (36.8 °C)  Heart Rate:  [87] 87  Resp:  [18] 18  BP: (133)/(87) 133/87    PHYSICAL EXAM:   General- NAD, alert and oriented, appropriate  Psych- Normal mood, good memory  CV- Regular rhythm, no murnurs  Resp- CTA to bases, no wheezes  Abdomen- NABS, soft, non distended, non tender, no masses  Pelvic- Defer to OR     Lab/Imaging/Other:  [facility]  Obstetrics and Gynecology  [place]      Ultrasound: 23     Patient:  Frances Rao    MR#:[id number]     43 y.o.   for GYN US for amenorrhea     Comparison: none     Method: TVUS     Findings:  Uterus: 14cm X10cm X11cm with two intramural fibroids   Largest fibroid 8cm X5cm X6cm, second measuring 7cm X5cm X6cm   Endometrium: 10.81mm  Ovaries: Bilateral ovaries with normal blood flow  Right ovary with 2 simple ovarian cyst  Let ovary with small hemorrhagic cyst measuring 1cm X 1cm X1cm      Interpretation:  Enlarged, fibroid uterus  Recommend EMB and then hysterectomy  See US report in media for full details of exam         Surgical Pathology Report (Final result) [id number]  Authorizing Provider: Afshan Magana DO Ordering Provider: Afshan Magana DO  Ordering Location: [facility] MAIN OR Collected: 2024 0858  Pathologist: Cecilia Mclaughlin MD Received: 2024 1238  .  Specimens  1 Endometrial Curettings, endometrial curettings  .  Clinical Information  Thickened endometrium  .  Final Diagnosis  Endometrium, currettage:  - Weakly proliferative endometrium  Electronically signed by Cecilia Mclaughlin MD on 2024 at 1020  .  Gross Description  1. Endometrial Curettings.  Received in  "formalin and labeled \"endometrial curettings\" is a 1.0 cm aggregate of red-pink soft tissue fragments admixed  with clotted blood on a Telfa pad. The specimen is entirely submitted in 1 cassette.  ASIF  Microscopic Description  Microscopic examination performed.    Assessment / Plan     Assessment:  AUB  Enlarged, fibroid uterus     Plan:   RALH with BS, cystoscopy   Patient to follow up in office in 2 weeks for post op visit. All questions and concerns have been answered.       Counseling: Risks and benefits and alternatives of surgery were discussed with patient.  Risks are not limited to anesthesia, bleeding, blood transfusion, infection, damage to surrounding organs, wound separation, re-operation, thromboembolic disease, death.  See separate written and signed consent for surgical specific risks and benefits.        Signature:   Electronically signed by:    Afshan Magana DO  08/09/24  06:58 EDT               "

## 2024-08-09 ENCOUNTER — HOSPITAL ENCOUNTER (OUTPATIENT)
Facility: HOSPITAL | Age: 44
Setting detail: HOSPITAL OUTPATIENT SURGERY
Discharge: HOME | End: 2024-08-09
Attending: OBSTETRICS & GYNECOLOGY | Admitting: OBSTETRICS & GYNECOLOGY
Payer: COMMERCIAL

## 2024-08-09 ENCOUNTER — ANESTHESIA EVENT CONVERTED (OUTPATIENT)
Dept: ANESTHESIOLOGY | Facility: HOSPITAL | Age: 44
End: 2024-08-09
Payer: COMMERCIAL

## 2024-08-09 ENCOUNTER — ANESTHESIA (OUTPATIENT)
Dept: PERIOP | Facility: HOSPITAL | Age: 44
End: 2024-08-09
Payer: COMMERCIAL

## 2024-08-09 VITALS
HEART RATE: 52 BPM | DIASTOLIC BLOOD PRESSURE: 69 MMHG | TEMPERATURE: 97.1 F | HEIGHT: 63 IN | SYSTOLIC BLOOD PRESSURE: 126 MMHG | RESPIRATION RATE: 16 BRPM | BODY MASS INDEX: 28.2 KG/M2 | OXYGEN SATURATION: 98 % | WEIGHT: 159.17 LBS

## 2024-08-09 DIAGNOSIS — N93.9 ABNORMAL UTERINE BLEEDING (AUB): ICD-10-CM

## 2024-08-09 DIAGNOSIS — Z90.710 S/P HYSTERECTOMY: Primary | ICD-10-CM

## 2024-08-09 DIAGNOSIS — D25.9 UTERINE LEIOMYOMA, UNSPECIFIED LOCATION: ICD-10-CM

## 2024-08-09 LAB
ABO GROUP BLD: NORMAL
ABO GROUP BLD: NORMAL
B-HCG UR QL: NEGATIVE
BASOPHILS # BLD AUTO: 0.04 10*3/MM3 (ref 0–0.2)
BASOPHILS NFR BLD AUTO: 0.6 % (ref 0–1.5)
BLD GP AB SCN SERPL QL: NEGATIVE
DEPRECATED RDW RBC AUTO: 45.8 FL (ref 37–54)
EOSINOPHIL # BLD AUTO: 0.21 10*3/MM3 (ref 0–0.4)
EOSINOPHIL NFR BLD AUTO: 3 % (ref 0.3–6.2)
ERYTHROCYTE [DISTWIDTH] IN BLOOD BY AUTOMATED COUNT: 13.6 % (ref 12.3–15.4)
HCT VFR BLD AUTO: 45 % (ref 34–46.6)
HGB BLD-MCNC: 14.9 G/DL (ref 12–15.9)
IMM GRANULOCYTES # BLD AUTO: 0.02 10*3/MM3 (ref 0–0.05)
IMM GRANULOCYTES NFR BLD AUTO: 0.3 % (ref 0–0.5)
LYMPHOCYTES # BLD AUTO: 2.39 10*3/MM3 (ref 0.7–3.1)
LYMPHOCYTES NFR BLD AUTO: 33.7 % (ref 19.6–45.3)
MCH RBC QN AUTO: 30.3 PG (ref 26.6–33)
MCHC RBC AUTO-ENTMCNC: 33.1 G/DL (ref 31.5–35.7)
MCV RBC AUTO: 91.6 FL (ref 79–97)
MONOCYTES # BLD AUTO: 0.42 10*3/MM3 (ref 0.1–0.9)
MONOCYTES NFR BLD AUTO: 5.9 % (ref 5–12)
NEUTROPHILS NFR BLD AUTO: 4.02 10*3/MM3 (ref 1.7–7)
NEUTROPHILS NFR BLD AUTO: 56.5 % (ref 42.7–76)
NRBC BLD AUTO-RTO: 0 /100 WBC (ref 0–0.2)
PLATELET # BLD AUTO: 212 10*3/MM3 (ref 140–450)
PMV BLD AUTO: 9.3 FL (ref 6–12)
RBC # BLD AUTO: 4.91 10*6/MM3 (ref 3.77–5.28)
RH BLD: POSITIVE
RH BLD: POSITIVE
T&S EXPIRATION DATE: NORMAL
WBC NRBC COR # BLD AUTO: 7.1 10*3/MM3 (ref 3.4–10.8)

## 2024-08-09 PROCEDURE — S2900 ROBOTIC SURGICAL SYSTEM: HCPCS | Performed by: OBSTETRICS & GYNECOLOGY

## 2024-08-09 PROCEDURE — 25810000003 LACTATED RINGERS PER 1000 ML: Performed by: ANESTHESIOLOGY

## 2024-08-09 PROCEDURE — 25010000002 MIDAZOLAM PER 1MG: Performed by: ANESTHESIOLOGY

## 2024-08-09 PROCEDURE — 25010000002 DEXAMETHASONE PER 1 MG

## 2024-08-09 PROCEDURE — 25010000002 FENTANYL CITRATE (PF) 50 MCG/ML SOLUTION: Performed by: ANESTHESIOLOGY

## 2024-08-09 PROCEDURE — 25010000002 SUGAMMADEX 200 MG/2ML SOLUTION

## 2024-08-09 PROCEDURE — 25010000002 LABETALOL 5 MG/ML SOLUTION

## 2024-08-09 PROCEDURE — 25010000002 HYDROMORPHONE 1 MG/ML SOLUTION

## 2024-08-09 PROCEDURE — 81025 URINE PREGNANCY TEST: CPT | Performed by: OBSTETRICS & GYNECOLOGY

## 2024-08-09 PROCEDURE — 25010000002 ROPIVACAINE PER 1 MG: Performed by: ANESTHESIOLOGY

## 2024-08-09 PROCEDURE — 25010000002 PROPOFOL 500 MG/50ML EMULSION

## 2024-08-09 PROCEDURE — 86900 BLOOD TYPING SEROLOGIC ABO: CPT | Performed by: OBSTETRICS & GYNECOLOGY

## 2024-08-09 PROCEDURE — 25010000002 ONDANSETRON PER 1 MG

## 2024-08-09 PROCEDURE — 86901 BLOOD TYPING SEROLOGIC RH(D): CPT | Performed by: OBSTETRICS & GYNECOLOGY

## 2024-08-09 PROCEDURE — 25010000002 PROPOFOL 10 MG/ML EMULSION

## 2024-08-09 PROCEDURE — 88307 TISSUE EXAM BY PATHOLOGIST: CPT | Performed by: OBSTETRICS & GYNECOLOGY

## 2024-08-09 PROCEDURE — 25010000002 MAGNESIUM SULFATE IN D5W 1G/100ML (PREMIX) 1-5 GM/100ML-% SOLUTION

## 2024-08-09 PROCEDURE — 85025 COMPLETE CBC W/AUTO DIFF WBC: CPT | Performed by: OBSTETRICS & GYNECOLOGY

## 2024-08-09 PROCEDURE — 25010000002 CEFAZOLIN PER 500 MG: Performed by: OBSTETRICS & GYNECOLOGY

## 2024-08-09 PROCEDURE — 25010000002 MIDAZOLAM PER 1MG

## 2024-08-09 PROCEDURE — 86850 RBC ANTIBODY SCREEN: CPT | Performed by: OBSTETRICS & GYNECOLOGY

## 2024-08-09 PROCEDURE — 86900 BLOOD TYPING SEROLOGIC ABO: CPT

## 2024-08-09 PROCEDURE — 25010000002 ESMOLOL 100 MG/10ML SOLUTION

## 2024-08-09 PROCEDURE — 86901 BLOOD TYPING SEROLOGIC RH(D): CPT

## 2024-08-09 PROCEDURE — 58573 TLH W/T/O UTERUS OVER 250 G: CPT | Performed by: OBSTETRICS & GYNECOLOGY

## 2024-08-09 DEVICE — SEAL HEMO SURG ARISTA/AH ABS/PWDR 3GM: Type: IMPLANTABLE DEVICE | Site: ABDOMEN | Status: FUNCTIONAL

## 2024-08-09 DEVICE — ABSORBABLE WOUND CLOSURE DEVICE
Type: IMPLANTABLE DEVICE | Site: ABDOMEN | Status: FUNCTIONAL
Brand: V-LOC 180

## 2024-08-09 RX ORDER — ROPIVACAINE HYDROCHLORIDE 5 MG/ML
INJECTION, SOLUTION EPIDURAL; INFILTRATION; PERINEURAL
Status: COMPLETED | OUTPATIENT
Start: 2024-08-09 | End: 2024-08-09

## 2024-08-09 RX ORDER — HYDROCODONE BITARTRATE AND ACETAMINOPHEN 5; 325 MG/1; MG/1
1 TABLET ORAL EVERY 6 HOURS PRN
Qty: 10 TABLET | Refills: 0 | Status: SHIPPED | OUTPATIENT
Start: 2024-08-09 | End: 2024-08-12

## 2024-08-09 RX ORDER — LIDOCAINE HYDROCHLORIDE 20 MG/ML
JELLY TOPICAL AS NEEDED
Status: DISCONTINUED | OUTPATIENT
Start: 2024-08-09 | End: 2024-08-09 | Stop reason: HOSPADM

## 2024-08-09 RX ORDER — ROPIVACAINE HYDROCHLORIDE 5 MG/ML
INJECTION, SOLUTION EPIDURAL; INFILTRATION; PERINEURAL
Status: COMPLETED
Start: 2024-08-09 | End: 2024-08-09

## 2024-08-09 RX ORDER — EPHEDRINE SULFATE 50 MG/ML
INJECTION INTRAVENOUS AS NEEDED
Status: DISCONTINUED | OUTPATIENT
Start: 2024-08-09 | End: 2024-08-09 | Stop reason: SURG

## 2024-08-09 RX ORDER — SODIUM CHLORIDE, SODIUM LACTATE, POTASSIUM CHLORIDE, CALCIUM CHLORIDE 600; 310; 30; 20 MG/100ML; MG/100ML; MG/100ML; MG/100ML
9 INJECTION, SOLUTION INTRAVENOUS CONTINUOUS PRN
Status: DISCONTINUED | OUTPATIENT
Start: 2024-08-09 | End: 2024-08-09 | Stop reason: HOSPADM

## 2024-08-09 RX ORDER — OXYCODONE HYDROCHLORIDE 5 MG/1
5 TABLET ORAL
Status: DISCONTINUED | OUTPATIENT
Start: 2024-08-09 | End: 2024-08-09 | Stop reason: HOSPADM

## 2024-08-09 RX ORDER — LIDOCAINE HYDROCHLORIDE 20 MG/ML
INJECTION, SOLUTION EPIDURAL; INFILTRATION; INTRACAUDAL; PERINEURAL AS NEEDED
Status: DISCONTINUED | OUTPATIENT
Start: 2024-08-09 | End: 2024-08-09 | Stop reason: SURG

## 2024-08-09 RX ORDER — DEXMEDETOMIDINE HYDROCHLORIDE 4 UG/ML
INJECTION, SOLUTION INTRAVENOUS AS NEEDED
Status: DISCONTINUED | OUTPATIENT
Start: 2024-08-09 | End: 2024-08-09 | Stop reason: SURG

## 2024-08-09 RX ORDER — MEPERIDINE HYDROCHLORIDE 25 MG/ML
12.5 INJECTION INTRAMUSCULAR; INTRAVENOUS; SUBCUTANEOUS
Status: DISCONTINUED | OUTPATIENT
Start: 2024-08-09 | End: 2024-08-09 | Stop reason: HOSPADM

## 2024-08-09 RX ORDER — ROCURONIUM BROMIDE 10 MG/ML
INJECTION, SOLUTION INTRAVENOUS AS NEEDED
Status: DISCONTINUED | OUTPATIENT
Start: 2024-08-09 | End: 2024-08-09 | Stop reason: SURG

## 2024-08-09 RX ORDER — FENTANYL CITRATE 50 UG/ML
INJECTION, SOLUTION INTRAMUSCULAR; INTRAVENOUS AS NEEDED
Status: DISCONTINUED | OUTPATIENT
Start: 2024-08-09 | End: 2024-08-09 | Stop reason: SURG

## 2024-08-09 RX ORDER — EPHEDRINE SULFATE 50 MG/ML
5 INJECTION, SOLUTION INTRAVENOUS ONCE AS NEEDED
Status: DISCONTINUED | OUTPATIENT
Start: 2024-08-09 | End: 2024-08-09 | Stop reason: HOSPADM

## 2024-08-09 RX ORDER — LABETALOL HYDROCHLORIDE 5 MG/ML
INJECTION, SOLUTION INTRAVENOUS AS NEEDED
Status: DISCONTINUED | OUTPATIENT
Start: 2024-08-09 | End: 2024-08-09 | Stop reason: SURG

## 2024-08-09 RX ORDER — SODIUM CHLORIDE 0.9 % (FLUSH) 0.9 %
3 SYRINGE (ML) INJECTION EVERY 12 HOURS SCHEDULED
Status: DISCONTINUED | OUTPATIENT
Start: 2024-08-09 | End: 2024-08-09 | Stop reason: HOSPADM

## 2024-08-09 RX ORDER — ONDANSETRON 2 MG/ML
INJECTION INTRAMUSCULAR; INTRAVENOUS AS NEEDED
Status: DISCONTINUED | OUTPATIENT
Start: 2024-08-09 | End: 2024-08-09 | Stop reason: SURG

## 2024-08-09 RX ORDER — ACETAMINOPHEN 500 MG
1000 TABLET ORAL ONCE
Status: DISCONTINUED | OUTPATIENT
Start: 2024-08-09 | End: 2024-08-09 | Stop reason: SDUPTHER

## 2024-08-09 RX ORDER — ESMOLOL HYDROCHLORIDE 10 MG/ML
INJECTION INTRAVENOUS AS NEEDED
Status: DISCONTINUED | OUTPATIENT
Start: 2024-08-09 | End: 2024-08-09 | Stop reason: SURG

## 2024-08-09 RX ORDER — PHENAZOPYRIDINE HYDROCHLORIDE 100 MG/1
100 TABLET, FILM COATED ORAL ONCE
Status: COMPLETED | OUTPATIENT
Start: 2024-08-09 | End: 2024-08-09

## 2024-08-09 RX ORDER — PROMETHAZINE HYDROCHLORIDE 25 MG/1
25 SUPPOSITORY RECTAL ONCE AS NEEDED
Status: DISCONTINUED | OUTPATIENT
Start: 2024-08-09 | End: 2024-08-09 | Stop reason: HOSPADM

## 2024-08-09 RX ORDER — KETAMINE HCL IN NACL, ISO-OSM 100MG/10ML
SYRINGE (ML) INJECTION AS NEEDED
Status: DISCONTINUED | OUTPATIENT
Start: 2024-08-09 | End: 2024-08-09 | Stop reason: SURG

## 2024-08-09 RX ORDER — SODIUM CHLORIDE 0.9 % (FLUSH) 0.9 %
10 SYRINGE (ML) INJECTION AS NEEDED
Status: DISCONTINUED | OUTPATIENT
Start: 2024-08-09 | End: 2024-08-09 | Stop reason: HOSPADM

## 2024-08-09 RX ORDER — MAGNESIUM SULFATE 1 G/100ML
2 INJECTION INTRAVENOUS ONCE
Status: COMPLETED | OUTPATIENT
Start: 2024-08-09 | End: 2024-08-09

## 2024-08-09 RX ORDER — PROPOFOL 10 MG/ML
INJECTION, EMULSION INTRAVENOUS CONTINUOUS PRN
Status: DISCONTINUED | OUTPATIENT
Start: 2024-08-09 | End: 2024-08-09 | Stop reason: SURG

## 2024-08-09 RX ORDER — PROMETHAZINE HYDROCHLORIDE 12.5 MG/1
25 TABLET ORAL ONCE AS NEEDED
Status: DISCONTINUED | OUTPATIENT
Start: 2024-08-09 | End: 2024-08-09 | Stop reason: HOSPADM

## 2024-08-09 RX ORDER — ACETAMINOPHEN 500 MG
1000 TABLET ORAL ONCE
Status: COMPLETED | OUTPATIENT
Start: 2024-08-09 | End: 2024-08-09

## 2024-08-09 RX ORDER — SCOLOPAMINE TRANSDERMAL SYSTEM 1 MG/1
1 PATCH, EXTENDED RELEASE TRANSDERMAL ONCE
Status: DISCONTINUED | OUTPATIENT
Start: 2024-08-09 | End: 2024-08-09 | Stop reason: HOSPADM

## 2024-08-09 RX ORDER — DEXAMETHASONE SODIUM PHOSPHATE 4 MG/ML
INJECTION, SOLUTION INTRA-ARTICULAR; INTRALESIONAL; INTRAMUSCULAR; INTRAVENOUS; SOFT TISSUE AS NEEDED
Status: DISCONTINUED | OUTPATIENT
Start: 2024-08-09 | End: 2024-08-09 | Stop reason: SURG

## 2024-08-09 RX ORDER — MIDAZOLAM HYDROCHLORIDE 2 MG/2ML
INJECTION, SOLUTION INTRAMUSCULAR; INTRAVENOUS AS NEEDED
Status: DISCONTINUED | OUTPATIENT
Start: 2024-08-09 | End: 2024-08-09 | Stop reason: SURG

## 2024-08-09 RX ORDER — DIPHENHYDRAMINE HYDROCHLORIDE 50 MG/ML
12.5 INJECTION INTRAMUSCULAR; INTRAVENOUS ONCE AS NEEDED
Status: DISCONTINUED | OUTPATIENT
Start: 2024-08-09 | End: 2024-08-09 | Stop reason: HOSPADM

## 2024-08-09 RX ORDER — AMOXICILLIN 250 MG
1 CAPSULE ORAL 2 TIMES DAILY PRN
Qty: 20 TABLET | Refills: 0 | Status: SHIPPED | OUTPATIENT
Start: 2024-08-09

## 2024-08-09 RX ORDER — MIDAZOLAM HYDROCHLORIDE 2 MG/2ML
2 INJECTION, SOLUTION INTRAMUSCULAR; INTRAVENOUS ONCE
Status: COMPLETED | OUTPATIENT
Start: 2024-08-09 | End: 2024-08-09

## 2024-08-09 RX ORDER — PHENYLEPHRINE HCL IN 0.9% NACL 1 MG/10 ML
SYRINGE (ML) INTRAVENOUS AS NEEDED
Status: DISCONTINUED | OUTPATIENT
Start: 2024-08-09 | End: 2024-08-09 | Stop reason: SURG

## 2024-08-09 RX ORDER — IBUPROFEN 800 MG/1
800 TABLET ORAL EVERY 6 HOURS PRN
Qty: 20 TABLET | Refills: 0 | Status: SHIPPED | OUTPATIENT
Start: 2024-08-09

## 2024-08-09 RX ORDER — LABETALOL HYDROCHLORIDE 5 MG/ML
5 INJECTION, SOLUTION INTRAVENOUS
Status: DISCONTINUED | OUTPATIENT
Start: 2024-08-09 | End: 2024-08-09 | Stop reason: HOSPADM

## 2024-08-09 RX ORDER — PETROLATUM,WHITE
OINTMENT IN PACKET (GRAM) TOPICAL AS NEEDED
Status: DISCONTINUED | OUTPATIENT
Start: 2024-08-09 | End: 2024-08-09 | Stop reason: SURG

## 2024-08-09 RX ORDER — ONDANSETRON 2 MG/ML
4 INJECTION INTRAMUSCULAR; INTRAVENOUS ONCE AS NEEDED
Status: DISCONTINUED | OUTPATIENT
Start: 2024-08-09 | End: 2024-08-09 | Stop reason: HOSPADM

## 2024-08-09 RX ORDER — SODIUM CHLORIDE 9 MG/ML
40 INJECTION, SOLUTION INTRAVENOUS AS NEEDED
Status: DISCONTINUED | OUTPATIENT
Start: 2024-08-09 | End: 2024-08-09 | Stop reason: HOSPADM

## 2024-08-09 RX ORDER — VENLAFAXINE 37.5 MG/1
37.5 TABLET ORAL NIGHTLY
COMMUNITY

## 2024-08-09 RX ORDER — FENTANYL CITRATE 50 UG/ML
INJECTION, SOLUTION INTRAMUSCULAR; INTRAVENOUS
Status: COMPLETED
Start: 2024-08-09 | End: 2024-08-09

## 2024-08-09 RX ORDER — PROPOFOL 10 MG/ML
VIAL (ML) INTRAVENOUS AS NEEDED
Status: DISCONTINUED | OUTPATIENT
Start: 2024-08-09 | End: 2024-08-09 | Stop reason: SURG

## 2024-08-09 RX ADMIN — Medication 0.1 PACKAGE: at 07:47

## 2024-08-09 RX ADMIN — PROPOFOL 150 MCG/KG/MIN: 10 INJECTION, EMULSION INTRAVENOUS at 07:52

## 2024-08-09 RX ADMIN — HYDROMORPHONE HYDROCHLORIDE 0.25 MG: 1 INJECTION, SOLUTION INTRAMUSCULAR; INTRAVENOUS; SUBCUTANEOUS at 13:48

## 2024-08-09 RX ADMIN — MIDAZOLAM HYDROCHLORIDE 2 MG: 1 INJECTION, SOLUTION INTRAMUSCULAR; INTRAVENOUS at 07:22

## 2024-08-09 RX ADMIN — SODIUM CHLORIDE 2000 MG: 9 INJECTION, SOLUTION INTRAVENOUS at 08:04

## 2024-08-09 RX ADMIN — DEXMEDETOMIDINE HYDROCHLORIDE IN 0.9% SODIUM CHLORIDE 8 MCG: 4 INJECTION INTRAVENOUS at 11:31

## 2024-08-09 RX ADMIN — Medication 25 MG: at 10:19

## 2024-08-09 RX ADMIN — Medication 200 MCG: at 08:18

## 2024-08-09 RX ADMIN — PROPOFOL 120 MCG/KG/MIN: 10 INJECTION, EMULSION INTRAVENOUS at 12:24

## 2024-08-09 RX ADMIN — PROPOFOL 140 MG: 10 INJECTION, EMULSION INTRAVENOUS at 07:46

## 2024-08-09 RX ADMIN — ROCURONIUM BROMIDE 20 MG: 10 INJECTION, SOLUTION INTRAVENOUS at 08:39

## 2024-08-09 RX ADMIN — DEXAMETHASONE SODIUM PHOSPHATE 8 MG: 4 INJECTION, SOLUTION INTRAMUSCULAR; INTRAVENOUS at 08:05

## 2024-08-09 RX ADMIN — Medication 25 MG: at 11:21

## 2024-08-09 RX ADMIN — MIDAZOLAM HYDROCHLORIDE 2 MG: 1 INJECTION, SOLUTION INTRAMUSCULAR; INTRAVENOUS at 07:08

## 2024-08-09 RX ADMIN — FENTANYL CITRATE 25 MCG: 50 INJECTION, SOLUTION INTRAMUSCULAR; INTRAVENOUS at 13:05

## 2024-08-09 RX ADMIN — PHENAZOPYRIDINE 100 MG: 100 TABLET ORAL at 07:09

## 2024-08-09 RX ADMIN — ROPIVACAINE HYDROCHLORIDE 20 ML: 5 INJECTION, SOLUTION EPIDURAL; INFILTRATION; PERINEURAL at 07:23

## 2024-08-09 RX ADMIN — LIDOCAINE HYDROCHLORIDE 80 MG: 20 INJECTION, SOLUTION EPIDURAL; INFILTRATION; INTRACAUDAL; PERINEURAL at 07:46

## 2024-08-09 RX ADMIN — SODIUM CHLORIDE, POTASSIUM CHLORIDE, SODIUM LACTATE AND CALCIUM CHLORIDE: 600; 310; 30; 20 INJECTION, SOLUTION INTRAVENOUS at 10:33

## 2024-08-09 RX ADMIN — EPHEDRINE SULFATE 10 MG: 50 INJECTION INTRAVENOUS at 08:16

## 2024-08-09 RX ADMIN — SUGAMMADEX 150 MG: 100 INJECTION, SOLUTION INTRAVENOUS at 13:11

## 2024-08-09 RX ADMIN — HYDROMORPHONE HYDROCHLORIDE 0.5 MG: 1 INJECTION, SOLUTION INTRAMUSCULAR; INTRAVENOUS; SUBCUTANEOUS at 09:42

## 2024-08-09 RX ADMIN — ROPIVACAINE HYDROCHLORIDE 20 ML: 5 INJECTION, SOLUTION EPIDURAL; INFILTRATION; PERINEURAL at 07:26

## 2024-08-09 RX ADMIN — FENTANYL CITRATE 50 MCG: 50 INJECTION, SOLUTION INTRAMUSCULAR; INTRAVENOUS at 07:23

## 2024-08-09 RX ADMIN — SUGAMMADEX 50 MG: 100 INJECTION, SOLUTION INTRAVENOUS at 13:05

## 2024-08-09 RX ADMIN — Medication 35 MG: at 08:21

## 2024-08-09 RX ADMIN — ESMOLOL HYDROCHLORIDE 20 MG: 10 INJECTION, SOLUTION INTRAVENOUS at 08:39

## 2024-08-09 RX ADMIN — PROPOFOL 125 MCG/KG/MIN: 10 INJECTION, EMULSION INTRAVENOUS at 08:32

## 2024-08-09 RX ADMIN — SODIUM CHLORIDE, POTASSIUM CHLORIDE, SODIUM LACTATE AND CALCIUM CHLORIDE 9 ML/HR: 600; 310; 30; 20 INJECTION, SOLUTION INTRAVENOUS at 07:09

## 2024-08-09 RX ADMIN — PROPOFOL 125 MCG/KG/MIN: 10 INJECTION, EMULSION INTRAVENOUS at 09:41

## 2024-08-09 RX ADMIN — DEXMEDETOMIDINE HYDROCHLORIDE IN 0.9% SODIUM CHLORIDE 8 MCG: 4 INJECTION INTRAVENOUS at 12:16

## 2024-08-09 RX ADMIN — PROPOFOL 120 MCG/KG/MIN: 10 INJECTION, EMULSION INTRAVENOUS at 11:28

## 2024-08-09 RX ADMIN — DEXMEDETOMIDINE HYDROCHLORIDE IN 0.9% SODIUM CHLORIDE 12 MCG: 4 INJECTION INTRAVENOUS at 07:40

## 2024-08-09 RX ADMIN — ROCURONIUM BROMIDE 20 MG: 10 INJECTION, SOLUTION INTRAVENOUS at 11:20

## 2024-08-09 RX ADMIN — ROCURONIUM BROMIDE 20 MG: 10 INJECTION, SOLUTION INTRAVENOUS at 10:39

## 2024-08-09 RX ADMIN — ESMOLOL HYDROCHLORIDE 30 MG: 10 INJECTION, SOLUTION INTRAVENOUS at 07:46

## 2024-08-09 RX ADMIN — LABETALOL HYDROCHLORIDE 2.5 MG: 5 INJECTION, SOLUTION INTRAVENOUS at 09:58

## 2024-08-09 RX ADMIN — PROPOFOL 120 MCG/KG/MIN: 10 INJECTION, EMULSION INTRAVENOUS at 10:34

## 2024-08-09 RX ADMIN — DEXMEDETOMIDINE HYDROCHLORIDE IN 0.9% SODIUM CHLORIDE 8 MCG: 4 INJECTION INTRAVENOUS at 08:21

## 2024-08-09 RX ADMIN — ACETAMINOPHEN 1000 MG: 500 TABLET ORAL at 07:08

## 2024-08-09 RX ADMIN — ONDANSETRON 4 MG: 2 INJECTION INTRAMUSCULAR; INTRAVENOUS at 12:51

## 2024-08-09 RX ADMIN — FENTANYL CITRATE 50 MCG: 50 INJECTION, SOLUTION INTRAMUSCULAR; INTRAVENOUS at 11:35

## 2024-08-09 RX ADMIN — SCOPALAMINE 1 PATCH: 1 PATCH, EXTENDED RELEASE TRANSDERMAL at 07:08

## 2024-08-09 RX ADMIN — DEXMEDETOMIDINE HYDROCHLORIDE IN 0.9% SODIUM CHLORIDE 8 MCG: 4 INJECTION INTRAVENOUS at 07:46

## 2024-08-09 RX ADMIN — ROCURONIUM BROMIDE 10 MG: 10 INJECTION, SOLUTION INTRAVENOUS at 09:31

## 2024-08-09 RX ADMIN — HYDROMORPHONE HYDROCHLORIDE 0.5 MG: 1 INJECTION, SOLUTION INTRAMUSCULAR; INTRAVENOUS; SUBCUTANEOUS at 08:48

## 2024-08-09 RX ADMIN — ROCURONIUM BROMIDE 70 MG: 10 INJECTION, SOLUTION INTRAVENOUS at 07:46

## 2024-08-09 RX ADMIN — DEXMEDETOMIDINE HYDROCHLORIDE IN 0.9% SODIUM CHLORIDE 8 MCG: 4 INJECTION INTRAVENOUS at 11:24

## 2024-08-09 RX ADMIN — ROCURONIUM BROMIDE 10 MG: 10 INJECTION, SOLUTION INTRAVENOUS at 11:57

## 2024-08-09 RX ADMIN — MAGNESIUM SULFATE HEPTAHYDRATE 2 G: 1 INJECTION, SOLUTION INTRAVENOUS at 10:05

## 2024-08-09 RX ADMIN — DEXMEDETOMIDINE HYDROCHLORIDE IN 0.9% SODIUM CHLORIDE 8 MCG: 4 INJECTION INTRAVENOUS at 08:38

## 2024-08-09 RX ADMIN — Medication 15 MG: at 09:20

## 2024-08-09 RX ADMIN — LABETALOL HYDROCHLORIDE 2.5 MG: 5 INJECTION, SOLUTION INTRAVENOUS at 12:16

## 2024-08-09 RX ADMIN — FENTANYL CITRATE 25 MCG: 50 INJECTION, SOLUTION INTRAMUSCULAR; INTRAVENOUS at 12:51

## 2024-08-09 RX ADMIN — ESMOLOL HYDROCHLORIDE 30 MG: 10 INJECTION, SOLUTION INTRAVENOUS at 08:58

## 2024-08-09 RX ADMIN — FENTANYL CITRATE 50 MCG: 50 INJECTION, SOLUTION INTRAMUSCULAR; INTRAVENOUS at 07:24

## 2024-08-09 RX ADMIN — LABETALOL HYDROCHLORIDE 2.5 MG: 5 INJECTION, SOLUTION INTRAVENOUS at 11:25

## 2024-08-09 RX ADMIN — OXYCODONE HYDROCHLORIDE 5 MG: 5 TABLET ORAL at 13:48

## 2024-08-09 RX ADMIN — SODIUM CHLORIDE 2000 MG: 9 INJECTION, SOLUTION INTRAVENOUS at 12:13

## 2024-08-09 NOTE — OP NOTE
PRE OP DIAGNOSIS:  Abnormal uterine bleeding, Enlarged fibroid uterus    POST OP DIAGNOSIS:  same, endometriosis    PROCEDURE:  DaVinci-assisted Total Laparoscopic Hysterectomy, Bilateral Salpingectomy, left oophorectomy, lysis of adhesions, cystoscopy     SURGEON:  Afshan Magana DO     ASSISTANT:  Assistant: Jessica Clark RN CSA was responsible for performing the following activities: Retraction, Suction, Irrigation, Closing, and Held/Positioned Camera and their skilled assistance was necessary for the success of this case.      ANESTHESIA PROVIDER:  Timmy Parisi MD    ANESTHESIA TYPE:  General    ESTIMATED BLOOD LOSS:  50ml     COMPLICATIONS:  None    DISPOSITION:  To recovery room in stable condition    FINDINGS:  Enlarged, fibroid uterus, left ovarian endometrioma, significant pelvic adhesions to left pelvic side wall and colon     SPECIMEN:  Uterus, cervix and fallopian tubes. Left ovary     PROCEDURE:  The patient was taken to the operating room where general anesthesia was placed without difficulty.  She was prepped and draped in a normal sterile fashion and placed in low lithotomy position and then I was called into the room.  A Bush catheter was placed sterilely.  An exam under anesthesia was performed.  A speculum was placed in the vagina and a single-tooth tenaculum was used to grasp the anterior lip of the cervix.  The uterus sounded to 14cm.  The cervix was measured and a small V-care manipulator was easily placed and balloons were inflated.  Attention was then turned to the abdomen.    Outer gloves were removed and new gloves were placed.  All incision sites were pre-injected with local anesthetic.  A 10 mm incision was made 2cm superior to the umbilicus. The anterior abdominal wall was tented up.  An Opti-Jania non-bladed trocar was placed intra-abdominally after visualizing all tissue planes.  Entry into the intra-abdominal cavity was confirmed.  A full evaluation of the abdomen and pelvis  was performed with findings as above.  There was no noted damage to underlying bowel, bladder, blood vessels, or organs.  Three more trocars were placed under direct visualization after pneumoperitoneum was established.  Left sided 5 & 10mm and  right sided 5mm ports were also placed without difficulty. DaVinci robot was then docked without difficulty. The pelvis was inspected with the findings above.     The Right ureter was noted to be vermiculating well below the level of the operating plane. The left side was noted to have significant adhesions to the left ovary and colon. Bipolar and scissors were used to lyse adhesions and re-store normal anatomy. The left side wall was opened to allow visualization of left ureter which was vermiculating well below the level of the operating plane. The Left IP ligament was grasped and cauterized and  from the ovary. Ovary was noted to have a large endometrioma and it was removed and placed into an endocatch bag.  The left  mesosalpinx was then excised using monopolar scissors and the fallopian tube was  from the mesosalpinx and was removed from the body cavity. The right mesosalpinx was then excised using monopolar scissors and the fallopian tube was  from the mesosalpinx and left attached to the uterus. Hemostasis was assured. The  right round ligament was grasped, cauterized and transected.  The broad ligament was opened and the bladder flap was created.  The uterine artery was skeletonized, cauterized, cut and lateralized.  In a similar fashion the left round ligament was grasped, cauterized and transected.  The broad ligament was opened.   The bladder flap was connected to the opposite side and dissected well below the level of the upper aspect of the V-care cup.  The uterine artery was skeletonized, cauterized, cut and lateralized.  The posterior vagina was entered. Using monopolar scissors a circumferential incision was made, following the  V-care cup. The specimen dad multiple large fibroids and due to the size the decision was made to use robotic hook to bivalve the uterus and remove fibroids in multiple pieces once it was amputated. Each piece was then  removed through the vagina and sent to pathology. The endocatch bag with right ovary and right ovarian endometrioma was removed from the body cavity vaginally.     Copious amounts of irrigation were performed.  The cuff was made hemostatic.  The vagina was then closed with running sutures of 0 V-Loc. Uterosacral ligaments were attached bilaterally to the posterolateral vault of the vaginal cuff for support.   All pedicles and cuff were reinspected and noted to be hemostatic.  CO2 pneumoperitoneum was taken down to zero in patient's abdomen to ensure hemostasis. Hemostasis again noted. Renetta powder was blown onto vaginal cuff and pedicles. Hemostasis remained assured.     Speculum was placed in the vagina and the cuff was visualized and palpated.  It was noted to be intact and hemostatic.        A rigid cystoscope was inserted through the urethra. Under direct visualization using normal saline solution distending media, the ureters were identified bilaterally and reflux was noted from the ureteral orifices bilaterally. The bladder was also examined and no evidence of injury was noted.    Attention was turned back up to the abdomen.  There was no evidence of active bleeding.  The bilateral ureters were visualized and they were of normal caliber.    The skin was closed with 4-0 Monocryl in a simple interrupted fashion. Steri-Strips and bandages were placed.      The patient tolerated the procedure well.  Instrument, lap, and needle counts were correct.  She received antibiotics prior to procedure.  She was taken to the recovery room in stable condition.       Electronically signed by:    Afshan Magana DO  08/09/24  13:21 EDT

## 2024-08-09 NOTE — DISCHARGE INSTRUCTIONS
DISCHARGE INSTRUCTIONS  GYNECOLOGICAL  PROCEDURES      For your surgery you had:  General anesthesia (you may have a sore throat for the first 24 hours)    You may experience dizziness, drowsiness, or lightheadedness for several hours following surgery.  Do not stay alone today or tonight.  Limit your activity for 24 hours.  Resume your diet slowly.  Follow any special dietary instructions you may have been given by your doctor.  You should not drive or operate machinery, drink alcohol, or sign legally binding documents for 24 hours or while you are taking pain medication.    NOTIFY YOUR DOCTOR IF YOU EXPERIENCE ANY OF THE FOLLOWING:  Temperature greater than 101 degrees Fahrenheit  Shaking Chills  Redness or excessive drainage from incision  Nausea, vomiting and/or pain that is not controlled by prescribed medications  Increase in bleeding or bleeding that is excessive  Unable to urinate in 6 hours after surgery  If unable to reach your doctor, please go to the closest Emergency Room [x] Change martha-pad as needed.    [x] Skin adhesive will flake off in 10-14 days.    [x] Nothing in the vagina until released by MD to include intercourse, douches, or tampons.  [x] Shower starting tomorrow, no submerging of incisions for 2 weeks.      Vaginal bleeding may be expected for several days with flow decreasing with time and never any heavier than a normal  period.  If you have an ablation, vaginal discharge is expected after bleeding stops.   If you have foul smelling discharge, notify your physician.  Medications per physician instructions as indicated on After Visit Summary.    Last dose of pain medication was given at:    .    SPECIAL INSTRUCTIONS:

## 2024-08-09 NOTE — ANESTHESIA POSTPROCEDURE EVALUATION
Patient: Frances Rao    Procedure Summary       Date: 08/09/24 Room / Location: [facility] OR  / [facility] MAIN OR    Anesthesia Start: 0739 Anesthesia Stop: 1327    Procedure: ROBOTIC ASSISTED LAPAROSCOPIC HYSTERECTOMY WITH BILATERAL SALPINGECTOMY, CYTSOCOPY (Abdomen) Diagnosis:       Abnormal uterine bleeding (AUB)      Uterine leiomyoma, unspecified location      (Abnormal uterine bleeding (AUB) [N93.9])      (Uterine leiomyoma, unspecified location [D25.9])    Surgeons: Afshan Magana DO Provider: Timmy Parisi MD    Anesthesia Type: general with block ASA Status: 2            Anesthesia Type: general with block    Vitals  Vitals Value Taken Time   /68 08/09/24 1400   Temp 36.1 °C (96.9 °F) 08/09/24 1320   Pulse 54 08/09/24 1402   Resp 14 08/09/24 1355   SpO2 96 % 08/09/24 1402   Vitals shown include unfiled device data.        Post Anesthesia Care and Evaluation    Patient location during evaluation: bedside  Patient participation: complete - patient participated  Level of consciousness: awake  Pain management: adequate    Airway patency: patent  PONV Status: none  Cardiovascular status: acceptable and stable  Respiratory status: acceptable  Hydration status: acceptable

## 2024-08-09 NOTE — ANESTHESIA PROCEDURE NOTES
Peripheral Block    Pre-sedation assessment completed: 8/9/2024 6:52 AM    Patient reassessed immediately prior to procedure    Patient location during procedure: pre-op  Start time: 8/9/2024 7:23 AM  Stop time: 8/9/2024 7:29 AM  Reason for block: at surgeon's request and post-op pain management  Preanesthetic Checklist  Completed: patient identified, IV checked, site marked, risks and benefits discussed, surgical consent, monitors and equipment checked, pre-op evaluation and timeout performed  Prep:  Pt Position: left lateral decubitus  Sterile barriers:partial drape, alcohol skin prep, cap, washed/disinfected hands, gloves and mask  Prep: ChloraPrep  Patient monitoring: blood pressure monitoring, EKG and continuous pulse oximetry  Procedure    Sedation: yes  Performed under: local infiltration  Guidance:ultrasound guided and landmark technique    ULTRASOUND INTERPRETATION.  Using ultrasound guidance a 22 G gauge needle was placed in close proximity to the nerve, at which point, under ultrasound guidance anesthetic was injected in the area of the nerve and spread of the anesthesia was seen on ultrasound in close proximity thereto.  There were no abnormalities seen on ultrasound; a digital image was taken; and the patient tolerated the procedure with no complications. Images:still images obtained, printed/placed on chart    Laterality:Bilateral  Block Type:erector spinae block  Injection Technique:single-shot  Needle Type:echogenic  Needle Gauge:22 G (2in)  Resistance on Injection: none    Medications Used: ropivacaine (NAROPIN) 0.5 % injection - Injection   20 mL - 8/9/2024 7:23:00 AM   20 mL - 8/9/2024 7:26:00 AM      Post Assessment  Injection Assessment: negative aspiration for heme, no paresthesia on injection and incremental injection  Patient Tolerance:comfortable throughout block  Complications:no  Additional Notes  The block or continuous infusion is requested by the referring physician for management of  postoperative pain, or pain related to a procedure. Ultrasound guidance (deemed medically necessary). Painless injection, pt was awake and conversant during the procedure without complications. Needle and surrounding structures visualized throughout procedure. No adverse reactions or complications seen during this period. Post-procedure image showed no signs of complication, and anatomy was consistent with an uncomplicated nerve blockade.  Performed by: Carina Andres MD

## 2024-08-09 NOTE — ANESTHESIA PREPROCEDURE EVALUATION
Anesthesia Evaluation     Patient summary reviewed and Nursing notes reviewed   no history of anesthetic complications:   NPO Solid Status: > 8 hours  NPO Liquid Status: > 2 hours           Airway   Mallampati: I  TM distance: >3 FB  Neck ROM: full  Dental - normal exam     Pulmonary - normal exam   (+) a smoker Current,  Cardiovascular - negative cardio ROS and normal exam  Exercise tolerance: good (4-7 METS)        Neuro/Psych  (+) headaches, psychiatric history Anxiety and Depression  GI/Hepatic/Renal/Endo - negative ROS     Musculoskeletal (-) negative ROS    Abdominal  - normal exam   Substance History - negative use     OB/GYN negative ob/gyn ROS         Other - negative ROS       ROS/Med Hx Other: PAT Nursing Notes unavailable.               Anesthesia Plan    ASA 2     general with block     intravenous induction     Anesthetic plan, risks, benefits, and alternatives have been provided, discussed and informed consent has been obtained with: patient.    Plan discussed with CRNA.    CODE STATUS:

## 2024-08-13 ENCOUNTER — TELEPHONE (OUTPATIENT)
Dept: OBSTETRICS AND GYNECOLOGY | Facility: CLINIC | Age: 44
End: 2024-08-13
Payer: COMMERCIAL

## 2024-08-13 NOTE — TELEPHONE ENCOUNTER
"[id number]: We are contacting you to reschedule your appt w Dr CALLE ON 11/18/24   We are sorry for any inconvenience.  This change is due to a change in our scheduling since [facility], not due to the provider requesting changes.\"  Please call the office to reschedule or if you have  any questions. HUB TO RELAY   "

## 2024-08-16 LAB
CYTO UR: NORMAL
LAB AP CASE REPORT: NORMAL
LAB AP CLINICAL INFORMATION: NORMAL
LAB AP DIAGNOSIS COMMENT: NORMAL
PATH REPORT.FINAL DX SPEC: NORMAL
PATH REPORT.GROSS SPEC: NORMAL

## 2024-08-22 ENCOUNTER — OFFICE VISIT (OUTPATIENT)
Dept: OBSTETRICS AND GYNECOLOGY | Facility: CLINIC | Age: 44
End: 2024-08-22
Payer: COMMERCIAL

## 2024-08-22 VITALS
BODY MASS INDEX: 26.58 KG/M2 | SYSTOLIC BLOOD PRESSURE: 130 MMHG | HEIGHT: 63 IN | DIASTOLIC BLOOD PRESSURE: 86 MMHG | WEIGHT: 150 LBS

## 2024-08-22 DIAGNOSIS — Z09 POSTOPERATIVE FOLLOW-UP: Primary | ICD-10-CM

## 2024-08-22 PROCEDURE — 99024 POSTOP FOLLOW-UP VISIT: CPT | Performed by: OBSTETRICS & GYNECOLOGY

## 2024-08-22 RX ORDER — HYDROXYZINE HYDROCHLORIDE 25 MG/1
25 TABLET, FILM COATED ORAL 3 TIMES DAILY PRN
Qty: 30 TABLET | Refills: 1 | Status: SHIPPED | OUTPATIENT
Start: 2024-08-22

## 2024-08-22 RX ORDER — PHENAZOPYRIDINE HYDROCHLORIDE 200 MG/1
200 TABLET, FILM COATED ORAL 3 TIMES DAILY PRN
Qty: 15 TABLET | Refills: 0 | Status: SHIPPED | OUTPATIENT
Start: 2024-08-22 | End: 2024-08-27

## 2024-08-22 NOTE — PROGRESS NOTES
"Post Operative Visit        Chief Complaint   Patient presents with    Post-op     ROBOTIC ASSISTED LAPAROSCOPIC HYSTERECTOMY WITH BILATERAL SALPINGECTOMY, CYTSOCOPY       HPI:   Frances Rao is here for a post op visit.  She had a DaVinci-assisted Total Laparoscopic Hysterectomy, Bilateral Salpingectomy, left oophorectomy, lysis of adhesions, cystoscopy  and has no complaints.  We have gone over her pathology and any available pictures and all questions have been answered.    Pain:  No  Vaginal bleeding:  No  Vaginal discharge:  No  Fever/chills:  No  Good appetite:  Yes  Normal bladder function:  Having some urethral irritation   Normal bowel function:  Yes  Hot flashes: No    PHYSICAL EXAM:  /86   Ht 160 cm (63\")   Wt 68 kg (150 lb)   LMP 06/28/2024 (Approximate)   BMI 26.57 kg/m²   General- NAD, alert and oriented, appropriate  Psych- Normal mood, good memory  Abdomen- Soft, non distended, non tender, no masses  Incisions-  Clean, dry, intact, healing well   Lymphatic- No palpable groin nodes  Ext- No edema, no cyanosis   Skin- No lesions, no rashes          ASSESSMENT and PLAN:  Post-operative exam    Diagnoses and all orders for this visit:    1. Postoperative follow-up (Primary)  -     hydrOXYzine (ATARAX) 25 MG tablet; Take 1 tablet by mouth 3 (Three) Times a Day As Needed for Anxiety.  Dispense: 30 tablet; Refill: 1  -     phenazopyridine (Pyridium) 200 MG tablet; Take 1 tablet by mouth 3 (Three) Times a Day As Needed for Bladder Spasms for up to 5 days.  Dispense: 15 tablet; Refill: 0        Operative report, surgical findings and any pathology/photos reviewed.  All questions answered.     Counseling:   Continue post op restrictions       Follow Up:  Return in about 4 weeks (around 9/19/2024) for Post op visit.    I spent 20 minutes on the separately reported service of Post op. This time is not included in the time used to support the E/M service also reported today.        Afshan Magana, " Form received in Medical Release for processing.Form sent to Doctor via interoffice mail for review and signature.    DO  08/22/2024    [facility]  [address] [place] [address]  Dept: [phone]  Dept Fax: [phone]  Loc: [phone]  Loc Fax: [phone]     Answers submitted by the patient for this visit:  Other (Submitted on 8/17/2024)  Please describe your symptoms.: After surgery check up  Have you had these symptoms before?: No  How long have you been having these symptoms?: 5-7 days  Primary Reason for Visit (Submitted on 8/17/2024)  What is the primary reason for your visit?: Other

## 2024-08-27 ENCOUNTER — TELEPHONE (OUTPATIENT)
Dept: SLEEP MEDICINE | Facility: HOSPITAL | Age: 44
End: 2024-08-27
Payer: COMMERCIAL

## 2024-08-27 NOTE — TELEPHONE ENCOUNTER
Pt called today bc the pharmacy has given her pramipexole .125mg #90 for a 90 day prescription.  Pt had a new script sent by Dr. Do on 8/6/24 for .25mg as one .125mg was not adequate.    Call to pharmacy and they admitted that they had pt on auto-refill and the incorrect dosage had been dispensed.  They state that pt can take 2 per night for 45 days and when she is ready for the new script she will be given the .25mg.  Just needed to document that pt will run short (45 days) as she will be taking 2 per night to be on the correct dosing.  Pharmacy states they will give pt correct dosing at 45 days and not 90 days.

## 2024-08-29 RX ORDER — VENLAFAXINE HYDROCHLORIDE 37.5 MG/1
37.5 CAPSULE, EXTENDED RELEASE ORAL DAILY
Qty: 30 CAPSULE | Refills: 3 | Status: SHIPPED | OUTPATIENT
Start: 2024-08-29

## 2024-09-04 RX ORDER — AMOXICILLIN 250 MG
1 CAPSULE ORAL 2 TIMES DAILY PRN
Qty: 20 TABLET | Refills: 0 | Status: SHIPPED | OUTPATIENT
Start: 2024-09-04

## 2024-09-04 NOTE — TELEPHONE ENCOUNTER
Patient requested refill on Senna docusate sodium.  Last seen 8/22/24.  Last filled Senna docusate sodium # 20.  Next appointment 9/16/24

## 2024-09-16 ENCOUNTER — OFFICE VISIT (OUTPATIENT)
Dept: OBSTETRICS AND GYNECOLOGY | Facility: CLINIC | Age: 44
End: 2024-09-16
Payer: COMMERCIAL

## 2024-09-16 VITALS
WEIGHT: 150 LBS | BODY MASS INDEX: 26.58 KG/M2 | SYSTOLIC BLOOD PRESSURE: 114 MMHG | DIASTOLIC BLOOD PRESSURE: 80 MMHG | HEIGHT: 63 IN

## 2024-09-16 DIAGNOSIS — N89.8 VAGINAL DISCHARGE: ICD-10-CM

## 2024-09-16 DIAGNOSIS — Z09 POSTOPERATIVE FOLLOW-UP: Primary | ICD-10-CM

## 2024-09-16 LAB
CANDIDA SPECIES: NEGATIVE
GARDNERELLA VAGINALIS: POSITIVE
T VAGINALIS DNA VAG QL PROBE+SIG AMP: NEGATIVE

## 2024-09-16 PROCEDURE — 99024 POSTOP FOLLOW-UP VISIT: CPT | Performed by: OBSTETRICS & GYNECOLOGY

## 2024-09-16 PROCEDURE — 87480 CANDIDA DNA DIR PROBE: CPT | Performed by: OBSTETRICS & GYNECOLOGY

## 2024-09-16 PROCEDURE — 87510 GARDNER VAG DNA DIR PROBE: CPT | Performed by: OBSTETRICS & GYNECOLOGY

## 2024-09-16 PROCEDURE — 87660 TRICHOMONAS VAGIN DIR PROBE: CPT | Performed by: OBSTETRICS & GYNECOLOGY

## 2024-09-17 DIAGNOSIS — N76.0 BV (BACTERIAL VAGINOSIS): Primary | ICD-10-CM

## 2024-09-17 DIAGNOSIS — B96.89 BV (BACTERIAL VAGINOSIS): Primary | ICD-10-CM

## 2024-09-17 RX ORDER — METRONIDAZOLE 500 MG/1
500 TABLET ORAL 2 TIMES DAILY
Qty: 14 TABLET | Refills: 0 | Status: SHIPPED | OUTPATIENT
Start: 2024-09-17 | End: 2024-09-24

## 2024-10-03 ENCOUNTER — OFFICE VISIT (OUTPATIENT)
Dept: INTERNAL MEDICINE | Facility: CLINIC | Age: 44
End: 2024-10-03
Payer: COMMERCIAL

## 2024-10-03 VITALS
RESPIRATION RATE: 16 BRPM | HEIGHT: 63 IN | BODY MASS INDEX: 25.87 KG/M2 | OXYGEN SATURATION: 100 % | SYSTOLIC BLOOD PRESSURE: 112 MMHG | HEART RATE: 65 BPM | DIASTOLIC BLOOD PRESSURE: 72 MMHG | WEIGHT: 146 LBS | TEMPERATURE: 97.6 F

## 2024-10-03 DIAGNOSIS — R23.2 HOT FLASHES: ICD-10-CM

## 2024-10-03 DIAGNOSIS — G47.01 INSOMNIA DUE TO MEDICAL CONDITION: Primary | ICD-10-CM

## 2024-10-03 LAB
ALBUMIN SERPL-MCNC: 4.8 G/DL (ref 3.5–5.2)
ALBUMIN/GLOB SERPL: 1.9 G/DL
ALP SERPL-CCNC: 81 U/L (ref 39–117)
ALT SERPL W P-5'-P-CCNC: 15 U/L (ref 1–33)
ANION GAP SERPL CALCULATED.3IONS-SCNC: 11 MMOL/L (ref 5–15)
AST SERPL-CCNC: 11 U/L (ref 1–32)
BILIRUB SERPL-MCNC: 0.4 MG/DL (ref 0–1.2)
BUN SERPL-MCNC: 8 MG/DL (ref 6–20)
BUN/CREAT SERPL: 7.3 (ref 7–25)
CALCIUM SPEC-SCNC: 10.7 MG/DL (ref 8.6–10.5)
CHLORIDE SERPL-SCNC: 100 MMOL/L (ref 98–107)
CHOLEST SERPL-MCNC: 217 MG/DL (ref 0–200)
CO2 SERPL-SCNC: 27 MMOL/L (ref 22–29)
CREAT SERPL-MCNC: 1.09 MG/DL (ref 0.57–1)
EGFRCR SERPLBLD CKD-EPI 2021: 65.2 ML/MIN/1.73
GLOBULIN UR ELPH-MCNC: 2.5 GM/DL
GLUCOSE SERPL-MCNC: 69 MG/DL (ref 65–99)
HDLC SERPL-MCNC: 35 MG/DL (ref 40–60)
LDLC SERPL CALC-MCNC: 164 MG/DL (ref 0–100)
LDLC/HDLC SERPL: 4.63 {RATIO}
POTASSIUM SERPL-SCNC: 3.9 MMOL/L (ref 3.5–5.2)
PROT SERPL-MCNC: 7.3 G/DL (ref 6–8.5)
SODIUM SERPL-SCNC: 138 MMOL/L (ref 136–145)
T4 FREE SERPL-MCNC: 1.41 NG/DL (ref 0.92–1.68)
TRIGL SERPL-MCNC: 99 MG/DL (ref 0–150)
TSH SERPL DL<=0.05 MIU/L-ACNC: 0.61 UIU/ML (ref 0.27–4.2)
VLDLC SERPL-MCNC: 18 MG/DL (ref 5–40)

## 2024-10-03 PROCEDURE — 1159F MED LIST DOCD IN RCRD: CPT | Performed by: PHYSICIAN ASSISTANT

## 2024-10-03 PROCEDURE — 80053 COMPREHEN METABOLIC PANEL: CPT | Performed by: PHYSICIAN ASSISTANT

## 2024-10-03 PROCEDURE — 1126F AMNT PAIN NOTED NONE PRSNT: CPT | Performed by: PHYSICIAN ASSISTANT

## 2024-10-03 PROCEDURE — 99213 OFFICE O/P EST LOW 20 MIN: CPT | Performed by: PHYSICIAN ASSISTANT

## 2024-10-03 PROCEDURE — 80061 LIPID PANEL: CPT | Performed by: PHYSICIAN ASSISTANT

## 2024-10-03 PROCEDURE — 84439 ASSAY OF FREE THYROXINE: CPT | Performed by: PHYSICIAN ASSISTANT

## 2024-10-03 PROCEDURE — 1160F RVW MEDS BY RX/DR IN RCRD: CPT | Performed by: PHYSICIAN ASSISTANT

## 2024-10-03 PROCEDURE — 84443 ASSAY THYROID STIM HORMONE: CPT | Performed by: PHYSICIAN ASSISTANT

## 2024-10-03 RX ORDER — TRAZODONE HYDROCHLORIDE 50 MG/1
50 TABLET, FILM COATED ORAL NIGHTLY
Qty: 30 TABLET | Refills: 1 | Status: SHIPPED | OUTPATIENT
Start: 2024-10-03

## 2024-10-03 NOTE — PROGRESS NOTES
Chief Complaint  Insomnia, post hysterectomy , and post menopausal symptoms    Subjective          Frances Rao presents to [facility]  History of Present Illness  Pt had hysterectomy 8/9/24  Pt has been having issues with increased hot flash and cold flashes since having hysterecotomy   She has been taking clonidine which helps at times  Has had issues with insomnia x months  Pt able to fall asleep when taking unisom   Issue staying asleep   Wakes up at least 2-3 x/night  Denies snoring  Has not taken Hydroxyzine       Past Medical History:   Diagnosis Date    Abnormal Pap smear of cervix     Abnormal uterine bleeding (AUB)     Anxiety     Fibroid     History of sleep walking     Insomnia     Migraine     Ovarian cyst         Past Surgical History:   Procedure Laterality Date    COLPOSCOPY      D & C HYSTEROSCOPY N/A 2/1/2024    Procedure: DILATATION AND CURETTAGE HYSTEROSCOPY;  Surgeon: Afshan Magana DO;  Location: [facility];  Service: Gynecology;  Laterality: N/A;    TOTAL LAPAROSCOPIC HYSTERECTOMY N/A 8/9/2024    Procedure: ROBOTIC ASSISTED LAPAROSCOPIC HYSTERECTOMY WITH BILATERAL SALPINGECTOMY, CYTSOCOPY;  Surgeon: Afshan Magana DO;  Location: [facility] OR;  Service: Robotics - DaVinci;  Laterality: N/A;    WISDOM TOOTH EXTRACTION          Current Outpatient Medications on File Prior to Visit   Medication Sig Dispense Refill    cloNIDine (CATAPRES) 0.1 MG tablet Take 1 tablet by mouth Every Night. 90 tablet 1    pramipexole (MIRAPEX) 0.25 MG tablet Take 1 tablet by mouth Every Night. 30 tablet 11    sennosides-docusate (senna-docusate sodium) 8.6-50 MG per tablet Take 1 tablet by mouth 2 (Two) Times a Day As Needed for Constipation. 20 tablet 0    [DISCONTINUED] doxylamine (UNISOM) 25 MG tablet Take 4 tablets by mouth At Night As Needed for Sleep.      [DISCONTINUED] hydrOXYzine (ATARAX) 25 MG tablet Take 1 tablet by mouth 3 (Three) Times a Day As  "Needed for Anxiety. (Patient not taking: Reported on 10/3/2024) 30 tablet 1     No current facility-administered medications on file prior to visit.        No Known Allergies    Social History     Tobacco Use   Smoking Status Every Day    Current packs/day: 0.50    Average packs/day: 0.5 packs/day for 50.1 years (25.1 ttl pk-yrs)    Types: Cigarettes    Start date: 8/12/1996   Smokeless Tobacco Never   Tobacco Comments    Last 1/31/24          Objective   Vital Signs:   /72 (BP Location: Left arm, Patient Position: Sitting, Cuff Size: Adult)   Pulse 65   Temp 97.6 °F (36.4 °C) (Temporal)   Resp 16   Ht 160 cm (63\")   Wt 66.2 kg (146 lb)   SpO2 100%   BMI 25.86 kg/m²     Physical Exam  Vitals reviewed.   Constitutional:       Appearance: Normal appearance.   HENT:      Head: Normocephalic and atraumatic.      Nose: Nose normal.      Mouth/Throat:      Mouth: Mucous membranes are moist.   Eyes:      Extraocular Movements: Extraocular movements intact.      Conjunctiva/sclera: Conjunctivae normal.      Pupils: Pupils are equal, round, and reactive to light.   Cardiovascular:      Rate and Rhythm: Normal rate and regular rhythm.   Pulmonary:      Effort: Pulmonary effort is normal.      Breath sounds: Normal breath sounds.   Abdominal:      General: Abdomen is flat. Bowel sounds are normal.      Palpations: Abdomen is soft.   Musculoskeletal:         General: Normal range of motion.   Neurological:      General: No focal deficit present.      Mental Status: She is alert and oriented to person, place, and time.   Psychiatric:         Mood and Affect: Mood normal.        Result Review :                           Assessment and Plan    Diagnoses and all orders for this visit:    1. Insomnia due to medical condition (Primary)  Assessment & Plan:  Discussed different causes of insomnia with patient. Encouraged healthy diet and daily exercise. Discussed sleep hygiene recommendations below. Will try Trazodone for " sleep.     Advised good sleep habits and patterns to include:  1. Setting a goal for at least 7 to 8 hours of sleep time per day.  2. Using the bed mainly for sleep and to go to bed only when tired. If unable to fall asleep after 30 minutes, patient should get out of bed but should not engage in any activity that requires sustained mental alertness.  3. Maintaining a regular bedtime and wake-up time even on weekends or days off of work.  4. Avoiding excessive naps during the daytime. If a nap is necessary, limit it to no more than 30 minutes.  5. Minimizing environmental noise, bright lights, and extremes in bedroom temperature.  6. Avoiding alcohol, caffeinated beverages, and nicotine products for at least 6 hours prior to bedtime.  7. Avoiding strenuous exercise and large meals for at least 4 hours prior to bedtime.         Orders:  -     Comprehensive Metabolic Panel  -     Lipid Panel  -     TSH  -     T4, Free    2. Hot flashes  Assessment & Plan:  Discussed need to f/u to discuss with gyn but likely normal due to recent hysterectomy.     Orders:  -     Comprehensive Metabolic Panel  -     Lipid Panel  -     TSH  -     T4, Free    Other orders  -     traZODone (DESYREL) 50 MG tablet; Take 1 tablet by mouth Every Night.  Dispense: 30 tablet; Refill: 1        Follow Up   Return if symptoms worsen or fail to improve.  Patient was given instructions and counseling regarding her condition or for health maintenance advice. Please see specific information pulled into the AVS if appropriate.

## 2024-10-03 NOTE — ASSESSMENT & PLAN NOTE
Discussed different causes of insomnia with patient. Encouraged healthy diet and daily exercise. Discussed sleep hygiene recommendations below. Will try Trazodone for sleep.     Advised good sleep habits and patterns to include:  1. Setting a goal for at least 7 to 8 hours of sleep time per day.  2. Using the bed mainly for sleep and to go to bed only when tired. If unable to fall asleep after 30 minutes, patient should get out of bed but should not engage in any activity that requires sustained mental alertness.  3. Maintaining a regular bedtime and wake-up time even on weekends or days off of work.  4. Avoiding excessive naps during the daytime. If a nap is necessary, limit it to no more than 30 minutes.  5. Minimizing environmental noise, bright lights, and extremes in bedroom temperature.  6. Avoiding alcohol, caffeinated beverages, and nicotine products for at least 6 hours prior to bedtime.  7. Avoiding strenuous exercise and large meals for at least 4 hours prior to bedtime.

## 2024-10-08 ENCOUNTER — HOSPITAL ENCOUNTER (OUTPATIENT)
Dept: MAMMOGRAPHY | Facility: HOSPITAL | Age: 44
Discharge: HOME | End: 2024-10-08
Payer: COMMERCIAL

## 2024-10-08 ENCOUNTER — HOSPITAL ENCOUNTER (OUTPATIENT)
Dept: ULTRASOUND IMAGING | Facility: HOSPITAL | Age: 44
Discharge: HOME | End: 2024-10-08
Payer: COMMERCIAL

## 2024-10-08 DIAGNOSIS — R92.8 ABNORMAL MAMMOGRAM: ICD-10-CM

## 2024-10-08 PROCEDURE — G0279 TOMOSYNTHESIS, MAMMO: HCPCS

## 2024-10-08 PROCEDURE — 77066 DX MAMMO INCL CAD BI: CPT

## 2024-10-15 ENCOUNTER — TELEPHONE (OUTPATIENT)
Dept: INTERNAL MEDICINE | Facility: CLINIC | Age: 44
End: 2024-10-15
Payer: COMMERCIAL

## 2024-10-15 NOTE — TELEPHONE ENCOUNTER
Patient called office stating she was told by provider that whenever she passes out again she needed to call the office, patient stated a few nights ago she passed out she stated she felt hot and then everything went black, she also mention having numbness on her arms and when she woke up she was on the floor, patient is wanting to know if she should get any testing prior to appt on 11/1 or if she should be seen sooner, please advise

## 2024-10-16 NOTE — TELEPHONE ENCOUNTER
Spoke with patient rely message, pt stated it just happened one time she stated that her dad told her if it happen again he will take her to ER, patient is wanting to know if she should get any testing done prior follow up on 11/1, please advise

## 2024-10-16 NOTE — TELEPHONE ENCOUNTER
Yes would recommend labs and imaging.  I can see her on the 21st or 23rd.  If she has another episode immediately needs to call 911/to ER

## 2024-10-21 ENCOUNTER — OFFICE VISIT (OUTPATIENT)
Dept: INTERNAL MEDICINE | Facility: CLINIC | Age: 44
End: 2024-10-21
Payer: COMMERCIAL

## 2024-10-21 ENCOUNTER — TELEPHONE (OUTPATIENT)
Dept: INTERNAL MEDICINE | Facility: CLINIC | Age: 44
End: 2024-10-21

## 2024-10-21 VITALS
HEART RATE: 76 BPM | BODY MASS INDEX: 25.94 KG/M2 | RESPIRATION RATE: 16 BRPM | TEMPERATURE: 97.9 F | HEIGHT: 63 IN | DIASTOLIC BLOOD PRESSURE: 68 MMHG | OXYGEN SATURATION: 97 % | SYSTOLIC BLOOD PRESSURE: 114 MMHG | WEIGHT: 146.38 LBS

## 2024-10-21 DIAGNOSIS — R55 SYNCOPE, UNSPECIFIED SYNCOPE TYPE: Primary | ICD-10-CM

## 2024-10-21 DIAGNOSIS — K62.5 RECTAL BLEED: ICD-10-CM

## 2024-10-21 DIAGNOSIS — G47.01 INSOMNIA DUE TO MEDICAL CONDITION: ICD-10-CM

## 2024-10-21 DIAGNOSIS — I10 ORTHOSTATIC HYPERTENSION: ICD-10-CM

## 2024-10-21 PROBLEM — R03.0 ELEVATED BLOOD PRESSURE READING WITHOUT DIAGNOSIS OF HYPERTENSION: Status: RESOLVED | Noted: 2023-12-20 | Resolved: 2024-10-21

## 2024-10-21 LAB
ALBUMIN SERPL-MCNC: 4.5 G/DL (ref 3.5–5.2)
ALBUMIN/GLOB SERPL: 1.6 G/DL
ALP SERPL-CCNC: 77 U/L (ref 39–117)
ALT SERPL W P-5'-P-CCNC: 18 U/L (ref 1–33)
ANION GAP SERPL CALCULATED.3IONS-SCNC: 11.4 MMOL/L (ref 5–15)
AST SERPL-CCNC: 14 U/L (ref 1–32)
BASOPHILS # BLD AUTO: 0.04 10*3/MM3 (ref 0–0.2)
BASOPHILS NFR BLD AUTO: 0.6 % (ref 0–1.5)
BILIRUB SERPL-MCNC: 0.3 MG/DL (ref 0–1.2)
BUN SERPL-MCNC: 12 MG/DL (ref 6–20)
BUN/CREAT SERPL: 12.5 (ref 7–25)
CALCIUM SPEC-SCNC: 10.3 MG/DL (ref 8.6–10.5)
CHLORIDE SERPL-SCNC: 104 MMOL/L (ref 98–107)
CO2 SERPL-SCNC: 24.6 MMOL/L (ref 22–29)
CREAT SERPL-MCNC: 0.96 MG/DL (ref 0.57–1)
DEPRECATED RDW RBC AUTO: 43.8 FL (ref 37–54)
EGFRCR SERPLBLD CKD-EPI 2021: 75.9 ML/MIN/1.73
EOSINOPHIL # BLD AUTO: 0.23 10*3/MM3 (ref 0–0.4)
EOSINOPHIL NFR BLD AUTO: 3.2 % (ref 0.3–6.2)
ERYTHROCYTE [DISTWIDTH] IN BLOOD BY AUTOMATED COUNT: 13.4 % (ref 12.3–15.4)
FERRITIN SERPL-MCNC: 37.1 NG/ML (ref 13–150)
GLOBULIN UR ELPH-MCNC: 2.9 GM/DL
GLUCOSE SERPL-MCNC: 106 MG/DL (ref 65–99)
HCT VFR BLD AUTO: 45.1 % (ref 34–46.6)
HGB BLD-MCNC: 14.8 G/DL (ref 12–15.9)
IMM GRANULOCYTES # BLD AUTO: 0.01 10*3/MM3 (ref 0–0.05)
IMM GRANULOCYTES NFR BLD AUTO: 0.1 % (ref 0–0.5)
IRON 24H UR-MRATE: 48 MCG/DL (ref 37–145)
IRON SATN MFR SERPL: 11 % (ref 20–50)
LYMPHOCYTES # BLD AUTO: 1.99 10*3/MM3 (ref 0.7–3.1)
LYMPHOCYTES NFR BLD AUTO: 27.7 % (ref 19.6–45.3)
MCH RBC QN AUTO: 29.4 PG (ref 26.6–33)
MCHC RBC AUTO-ENTMCNC: 32.8 G/DL (ref 31.5–35.7)
MCV RBC AUTO: 89.7 FL (ref 79–97)
MONOCYTES # BLD AUTO: 0.39 10*3/MM3 (ref 0.1–0.9)
MONOCYTES NFR BLD AUTO: 5.4 % (ref 5–12)
NEUTROPHILS NFR BLD AUTO: 4.53 10*3/MM3 (ref 1.7–7)
NEUTROPHILS NFR BLD AUTO: 63 % (ref 42.7–76)
NRBC BLD AUTO-RTO: 0 /100 WBC (ref 0–0.2)
PLATELET # BLD AUTO: 235 10*3/MM3 (ref 140–450)
PMV BLD AUTO: 10.3 FL (ref 6–12)
POTASSIUM SERPL-SCNC: 4.1 MMOL/L (ref 3.5–5.2)
PROT SERPL-MCNC: 7.4 G/DL (ref 6–8.5)
RBC # BLD AUTO: 5.03 10*6/MM3 (ref 3.77–5.28)
SODIUM SERPL-SCNC: 140 MMOL/L (ref 136–145)
TIBC SERPL-MCNC: 447 MCG/DL (ref 298–536)
TRANSFERRIN SERPL-MCNC: 300 MG/DL (ref 200–360)
WBC NRBC COR # BLD AUTO: 7.19 10*3/MM3 (ref 3.4–10.8)

## 2024-10-21 PROCEDURE — 84466 ASSAY OF TRANSFERRIN: CPT | Performed by: PHYSICIAN ASSISTANT

## 2024-10-21 PROCEDURE — 3074F SYST BP LT 130 MM HG: CPT | Performed by: PHYSICIAN ASSISTANT

## 2024-10-21 PROCEDURE — 80053 COMPREHEN METABOLIC PANEL: CPT | Performed by: PHYSICIAN ASSISTANT

## 2024-10-21 PROCEDURE — 1159F MED LIST DOCD IN RCRD: CPT | Performed by: PHYSICIAN ASSISTANT

## 2024-10-21 PROCEDURE — 83540 ASSAY OF IRON: CPT | Performed by: PHYSICIAN ASSISTANT

## 2024-10-21 PROCEDURE — 85025 COMPLETE CBC W/AUTO DIFF WBC: CPT | Performed by: PHYSICIAN ASSISTANT

## 2024-10-21 PROCEDURE — 1160F RVW MEDS BY RX/DR IN RCRD: CPT | Performed by: PHYSICIAN ASSISTANT

## 2024-10-21 PROCEDURE — 3078F DIAST BP <80 MM HG: CPT | Performed by: PHYSICIAN ASSISTANT

## 2024-10-21 PROCEDURE — 99214 OFFICE O/P EST MOD 30 MIN: CPT | Performed by: PHYSICIAN ASSISTANT

## 2024-10-21 PROCEDURE — 82728 ASSAY OF FERRITIN: CPT | Performed by: PHYSICIAN ASSISTANT

## 2024-10-21 PROCEDURE — 1126F AMNT PAIN NOTED NONE PRSNT: CPT | Performed by: PHYSICIAN ASSISTANT

## 2024-10-21 RX ORDER — TRAZODONE HYDROCHLORIDE 100 MG/1
100 TABLET ORAL NIGHTLY
Qty: 90 TABLET | Refills: 1 | Status: SHIPPED | OUTPATIENT
Start: 2024-10-21

## 2024-10-21 NOTE — TELEPHONE ENCOUNTER
Patient called office stating she was told to stop taking pramipexole she stated she can't stop taking that since its for her restless leg syndrome, please advise

## 2024-10-21 NOTE — PROGRESS NOTES
Chief Complaint  Hot Flashes, Back Pain, Restless Legs Syndrome, Rectal Bleeding, and Syncope    Subjective          Frances Rao presents to [facility]  History of Present Illness  Pt here with numerous complaints    She has had 4 episodes of near syncope in the past 2 yrs  Last episode was 10/13  She was walking down hallway and states she woke up on the ground on her back.   She states she had tunnel vision then vision blacked out. She felt clammy.   She hit head on corner of wall.  Denies associated chest pain, palpitations before episode.  She does not drink any water daily  She states sx of tunnel vision/blackening happen if she stands up quickly to stretch.   Denies seizures.     Pt states if she tries to strain for bm it causes bright red blood  Yesterday she thought she had to have bm but just passed gas, bright red blood went into the toilet and on her toilet paper   Denies abd pain  Pt has bm daily typically but sometimes skips days. Stool is hard at times.   She has Senna- Docusate since surgery but has not taken recently     Insomnia: has been doing well with Trazodone 50, when she takes two  She has been sleeping 5-6 hrs in deep sleep and not having issues falling asleep     Hot flashes: stable with clonidine     Past Medical History:   Diagnosis Date    Abnormal Pap smear of cervix     Abnormal uterine bleeding (AUB)     Anxiety     Fibroid     History of sleep walking     Insomnia     Migraine     Orthostatic hypertension 10/21/2024    Ovarian cyst         Past Surgical History:   Procedure Laterality Date    COLPOSCOPY      D & C HYSTEROSCOPY N/A 2/1/2024    Procedure: DILATATION AND CURETTAGE HYSTEROSCOPY;  Surgeon: Afshan Magana DO;  [facility] MAIN OR;  Service: Gynecology;  Laterality: N/A;    TOTAL LAPAROSCOPIC HYSTERECTOMY N/A 8/9/2024    Procedure: ROBOTIC ASSISTED LAPAROSCOPIC HYSTERECTOMY WITH BILATERAL SALPINGECTOMY, CYTSOCOPY;   "Surgeon: Afshan Magana DO;  Location: [facility] MAIN OR;  Service: Robotics - DaVinci;  Laterality: N/A;    WISDOM TOOTH EXTRACTION          Current Outpatient Medications on File Prior to Visit   Medication Sig Dispense Refill    cloNIDine (CATAPRES) 0.1 MG tablet Take 1 tablet by mouth Every Night. 90 tablet 1    [DISCONTINUED] pramipexole (MIRAPEX) 0.25 MG tablet Take 1 tablet by mouth Every Night. (Patient taking differently: Take 2 tablets by mouth Every Night.) 30 tablet 11    [DISCONTINUED] sennosides-docusate (senna-docusate sodium) 8.6-50 MG per tablet Take 1 tablet by mouth 2 (Two) Times a Day As Needed for Constipation. (Patient not taking: Reported on 10/21/2024) 20 tablet 0    [DISCONTINUED] traZODone (DESYREL) 50 MG tablet Take 1 tablet by mouth Every Night. (Patient not taking: Reported on 10/21/2024) 30 tablet 1     No current facility-administered medications on file prior to visit.        No Known Allergies    Social History     Tobacco Use   Smoking Status Every Day    Current packs/day: 0.50    Average packs/day: 0.5 packs/day for 50.2 years (25.1 ttl pk-yrs)    Types: Cigarettes    Start date: 8/12/1996   Smokeless Tobacco Never   Tobacco Comments    Last 1/31/24          Objective   Vital Signs:   /68 (BP Location: Right arm, Patient Position: Sitting, Cuff Size: Adult)   Pulse 76   Temp 97.9 °F (36.6 °C) (Temporal)   Resp 16   Ht 160 cm (63\")   Wt 66.4 kg (146 lb 6 oz)   SpO2 97%   BMI 25.93 kg/m²     Physical Exam  Vitals reviewed.   Constitutional:       Appearance: Normal appearance.   HENT:      Head: Normocephalic and atraumatic.      Nose: Nose normal.      Mouth/Throat:      Mouth: Mucous membranes are moist.   Eyes:      Extraocular Movements: Extraocular movements intact.      Conjunctiva/sclera: Conjunctivae normal.      Pupils: Pupils are equal, round, and reactive to light.   Cardiovascular:      Rate and Rhythm: Normal rate and regular rhythm.   Pulmonary:      Effort: " Pulmonary effort is normal.      Breath sounds: Normal breath sounds.   Abdominal:      General: Abdomen is flat. Bowel sounds are normal.      Palpations: Abdomen is soft.   Musculoskeletal:         General: Normal range of motion.   Neurological:      General: No focal deficit present.      Mental Status: She is alert and oriented to person, place, and time.   Psychiatric:         Mood and Affect: Mood normal.        Result Review :                           Assessment and Plan    Diagnoses and all orders for this visit:    1. Syncope, unspecified syncope type (Primary)  Assessment & Plan:  Discussed ddx. Orthostatic hypotension on exam. Neuro exam wnl. increase water intake. discussed importance of 3 meals daily and not skipping meals. Limit/avoid caffeine. RTC if sx return/worsen, syncope, loc, seizure, cp, palpitations, unilateral weakness, confusion. Pt understands and agrees      Orders:  -     Comprehensive Metabolic Panel  -     CBC & Differential  -     Ferritin  -     Iron Profile  -     Cancel: Ambulatory Referral to Gastroenterology  -     Ambulatory Referral to Gastroenterology    2. Rectal bleed  Assessment & Plan:  Discussed ddx. Labs today. Will refer to GI for eval. Discussed need to restart constipation medicine. To er if sx worsen, increase in pain, fever, vomiting. Pt understand and agrees      3. Orthostatic hypertension  Assessment & Plan:  Discussed orthostatic hypotension. Discussed this can cause dizziness/lightheadedness. Discussed need to increase water intake, make sure to eat 3 meals daily. Encouraged patient to change position slowly. RTC if sx return/worsen, syncope, loc, seizure, cp, palpitations, unilateral weakness, confusion. Pt understands and agrees        4. Insomnia due to medical condition  Assessment & Plan:  Improved, cont trazodone      Other orders  -     traZODone (DESYREL) 100 MG tablet; Take 1 tablet by mouth Every Night.  Dispense: 90 tablet; Refill: 1        Follow  Up   Return in about 1 month (around 11/21/2024).  Patient was given instructions and counseling regarding her condition or for health maintenance advice. Please see specific information pulled into the AVS if appropriate.

## 2024-10-21 NOTE — ASSESSMENT & PLAN NOTE
Discussed ddx. Orthostatic hypotension on exam. Neuro exam wnl. increase water intake. discussed importance of 3 meals daily and not skipping meals. Limit/avoid caffeine. RTC if sx return/worsen, syncope, loc, seizure, cp, palpitations, unilateral weakness, confusion. Pt understands and agrees

## 2024-10-21 NOTE — ASSESSMENT & PLAN NOTE
Discussed ddx. Labs today. Will refer to GI for eval. Discussed need to restart constipation medicine. To er if sx worsen, increase in pain, fever, vomiting. Pt understand and agrees

## 2024-10-22 NOTE — TELEPHONE ENCOUNTER
Patient called office wanting to know why provider wants her to stop taking medication, she stated she did not wanted to stop it since its helping, but is wanting to know why provider took her off medication, please advise

## 2024-10-23 RX ORDER — PRAMIPEXOLE DIHYDROCHLORIDE 0.5 MG/1
0.5 TABLET ORAL DAILY
COMMUNITY

## 2024-10-23 NOTE — TELEPHONE ENCOUNTER
Called and spoke with pt, explained to pt provider has added the medication back to pt's medication list, pt verbalized understanding and had no further questions at this time.

## 2024-10-23 NOTE — TELEPHONE ENCOUNTER
It looks like that was stopped at check-in.  It is fine to continue this medication.  I have readded it back to her chart.

## 2024-11-18 ENCOUNTER — OFFICE VISIT (OUTPATIENT)
Dept: INTERNAL MEDICINE | Facility: CLINIC | Age: 44
End: 2024-11-18
Payer: COMMERCIAL

## 2024-11-18 VITALS
HEIGHT: 63 IN | SYSTOLIC BLOOD PRESSURE: 108 MMHG | TEMPERATURE: 98.1 F | DIASTOLIC BLOOD PRESSURE: 70 MMHG | WEIGHT: 144.6 LBS | RESPIRATION RATE: 16 BRPM | BODY MASS INDEX: 25.62 KG/M2 | OXYGEN SATURATION: 99 % | HEART RATE: 51 BPM

## 2024-11-18 DIAGNOSIS — G47.00 INSOMNIA, UNSPECIFIED TYPE: Primary | ICD-10-CM

## 2024-11-18 DIAGNOSIS — F41.1 GENERALIZED ANXIETY DISORDER: ICD-10-CM

## 2024-11-18 DIAGNOSIS — K62.5 RECTAL BLEED: ICD-10-CM

## 2024-11-18 DIAGNOSIS — I10 ORTHOSTATIC HYPERTENSION: ICD-10-CM

## 2024-11-18 PROCEDURE — 1126F AMNT PAIN NOTED NONE PRSNT: CPT | Performed by: PHYSICIAN ASSISTANT

## 2024-11-18 PROCEDURE — 3074F SYST BP LT 130 MM HG: CPT | Performed by: PHYSICIAN ASSISTANT

## 2024-11-18 PROCEDURE — 3078F DIAST BP <80 MM HG: CPT | Performed by: PHYSICIAN ASSISTANT

## 2024-11-18 PROCEDURE — 1159F MED LIST DOCD IN RCRD: CPT | Performed by: PHYSICIAN ASSISTANT

## 2024-11-18 PROCEDURE — 99214 OFFICE O/P EST MOD 30 MIN: CPT | Performed by: PHYSICIAN ASSISTANT

## 2024-11-18 PROCEDURE — 1160F RVW MEDS BY RX/DR IN RCRD: CPT | Performed by: PHYSICIAN ASSISTANT

## 2024-11-18 RX ORDER — CITALOPRAM HYDROBROMIDE 10 MG/1
10 TABLET ORAL DAILY
Qty: 30 TABLET | Refills: 1 | Status: SHIPPED | OUTPATIENT
Start: 2024-11-18

## 2024-11-18 NOTE — PROGRESS NOTES
Chief Complaint  Insomnia, orthostatic hypertension, Syncope, Rectal Bleeding, and Anxiety    Subjective          Frances Rao presents to [facility]  History of Present Illness  Orthostatic hypotension: has increased water to 1 bottle/day   Insomnia: She does not think Trazodone is helping any longer  Difficulty staying asleep   No difficulty falling asleep   Anxiety has increased since restarting work   Admits to feeling down, sad  Going through relationship issues  Denies si/hi   Rectal bleeding: has not seen since last visit. States she cancelled GI appt since it resolved.   Denies abd pain      Past Medical History:   Diagnosis Date    Abnormal Pap smear of cervix     Abnormal uterine bleeding (AUB)     Anxiety     Fibroid     History of sleep walking     Insomnia     Migraine     Orthostatic hypertension 10/21/2024    Ovarian cyst         Past Surgical History:   Procedure Laterality Date    COLPOSCOPY      D & C HYSTEROSCOPY N/A 2/1/2024    Procedure: DILATATION AND CURETTAGE HYSTEROSCOPY;  Surgeon: Afshan Magana DO;  Location: [facility];  Service: Gynecology;  Laterality: N/A;    TOTAL LAPAROSCOPIC HYSTERECTOMY N/A 8/9/2024    Procedure: ROBOTIC ASSISTED LAPAROSCOPIC HYSTERECTOMY WITH BILATERAL SALPINGECTOMY, CYTSOCOPY;  Surgeon: Afshan Magana DO;  Location: [facility];  Service: Robotics - DaVinci;  Laterality: N/A;    WISDOM TOOTH EXTRACTION          Current Outpatient Medications on File Prior to Visit   Medication Sig Dispense Refill    cloNIDine (CATAPRES) 0.1 MG tablet Take 1 tablet by mouth Every Night. 90 tablet 1    pramipexole (MIRAPEX) 0.5 MG tablet Take 1 tablet by mouth Daily.      traZODone (DESYREL) 100 MG tablet Take 1 tablet by mouth Every Night. 90 tablet 1     No current facility-administered medications on file prior to visit.        No Known Allergies    Social History     Tobacco Use   Smoking Status Every Day    Current  "packs/day: 0.50    Average packs/day: 0.5 packs/day for 50.3 years (25.1 ttl pk-yrs)    Types: Cigarettes    Start date: 8/12/1996   Smokeless Tobacco Never   Tobacco Comments    Last 1/31/24          Objective   Vital Signs:   /70 (BP Location: Right arm, Patient Position: Sitting, Cuff Size: Adult)   Pulse 51   Temp 98.1 °F (36.7 °C) (Temporal)   Resp 16   Ht 160 cm (63\")   Wt 65.6 kg (144 lb 9.6 oz)   SpO2 99%   BMI 25.61 kg/m²     Physical Exam  Vitals reviewed.   Constitutional:       Appearance: Normal appearance.   HENT:      Head: Normocephalic and atraumatic.      Nose: Nose normal.      Mouth/Throat:      Mouth: Mucous membranes are moist.   Eyes:      Extraocular Movements: Extraocular movements intact.      Conjunctiva/sclera: Conjunctivae normal.      Pupils: Pupils are equal, round, and reactive to light.   Cardiovascular:      Rate and Rhythm: Normal rate and regular rhythm.   Pulmonary:      Effort: Pulmonary effort is normal.      Breath sounds: Normal breath sounds.   Abdominal:      General: Abdomen is flat. Bowel sounds are normal.      Palpations: Abdomen is soft.   Musculoskeletal:         General: Normal range of motion.   Neurological:      General: No focal deficit present.      Mental Status: She is alert and oriented to person, place, and time.   Psychiatric:         Mood and Affect: Mood normal.        Result Review :   The following data was reviewed by: Maria Guadalupe Francisco PA-C on 11/18/2024:  Common labs          8/9/2024    06:08 10/3/2024    11:55 10/21/2024    10:22   Common Labs   Glucose  69  106    BUN  8  12    Creatinine  1.09  0.96    Sodium  138  140    Potassium  3.9  4.1    Chloride  100  104    Calcium  10.7  10.3    Albumin  4.8  4.5    Total Bilirubin  0.4  0.3    Alkaline Phosphatase  81  77    AST (SGOT)  11  14    ALT (SGPT)  15  18    WBC 7.10   7.19    Hemoglobin 14.9   14.8    Hematocrit 45.0   45.1    Platelets 212   235    Total Cholesterol  217   "   Triglycerides  99     HDL Cholesterol  35     LDL Cholesterol   164                    Assessment and Plan    Diagnoses and all orders for this visit:    1. Insomnia, unspecified type (Primary)  -     Ambulatory Referral to Psychiatry    2. Generalized anxiety disorder  Assessment & Plan:  Discussed poor control over anxiety. Discussed SSRI, length of time to see improvement, and ADR (including increased risk for SI/HI, sexual dysfunction). Will take 1 month to see max effect. To ER if SI/HIi.  Will see if tx of anxiety improves sleep issues.    Orders:  -     Ambulatory Referral to Psychiatry    3. Orthostatic hypertension  Comments:  Will cont to increase water intake and change position slowly.    4. Rectal bleed  Comments:  Pt will call gi to schedule colonoscopy for eval.    Other orders  -     citalopram (CeleXA) 10 MG tablet; Take 1 tablet by mouth Daily.  Dispense: 30 tablet; Refill: 1        Follow Up   Return in about 1 month (around 12/18/2024).  Patient was given instructions and counseling regarding her condition or for health maintenance advice. Please see specific information pulled into the AVS if appropriate.

## 2024-11-20 PROBLEM — F41.1 GENERALIZED ANXIETY DISORDER: Status: ACTIVE | Noted: 2024-11-20

## 2024-11-20 NOTE — ASSESSMENT & PLAN NOTE
Discussed poor control over anxiety. Discussed SSRI, length of time to see improvement, and ADR (including increased risk for SI/HI, sexual dysfunction). Will take 1 month to see max effect. To ER if SI/HIi.  Will see if tx of anxiety improves sleep issues.

## 2024-12-18 ENCOUNTER — OFFICE VISIT (OUTPATIENT)
Dept: INTERNAL MEDICINE | Facility: CLINIC | Age: 44
End: 2024-12-18
Payer: COMMERCIAL

## 2024-12-18 VITALS
TEMPERATURE: 97.9 F | HEIGHT: 63 IN | OXYGEN SATURATION: 96 % | RESPIRATION RATE: 16 BRPM | WEIGHT: 138.8 LBS | DIASTOLIC BLOOD PRESSURE: 80 MMHG | SYSTOLIC BLOOD PRESSURE: 112 MMHG | BODY MASS INDEX: 24.59 KG/M2 | HEART RATE: 74 BPM

## 2024-12-18 DIAGNOSIS — R42 LIGHTHEADEDNESS: Primary | ICD-10-CM

## 2024-12-18 DIAGNOSIS — H53.9 VISION CHANGES: ICD-10-CM

## 2024-12-18 DIAGNOSIS — F41.1 GENERALIZED ANXIETY DISORDER: ICD-10-CM

## 2024-12-18 PROCEDURE — 3079F DIAST BP 80-89 MM HG: CPT | Performed by: PHYSICIAN ASSISTANT

## 2024-12-18 PROCEDURE — 1159F MED LIST DOCD IN RCRD: CPT | Performed by: PHYSICIAN ASSISTANT

## 2024-12-18 PROCEDURE — 3074F SYST BP LT 130 MM HG: CPT | Performed by: PHYSICIAN ASSISTANT

## 2024-12-18 PROCEDURE — 1126F AMNT PAIN NOTED NONE PRSNT: CPT | Performed by: PHYSICIAN ASSISTANT

## 2024-12-18 PROCEDURE — 99214 OFFICE O/P EST MOD 30 MIN: CPT | Performed by: PHYSICIAN ASSISTANT

## 2024-12-18 PROCEDURE — 1160F RVW MEDS BY RX/DR IN RCRD: CPT | Performed by: PHYSICIAN ASSISTANT

## 2024-12-18 RX ORDER — MECLIZINE HCL 12.5 MG 12.5 MG/1
12.5 TABLET ORAL 3 TIMES DAILY PRN
Qty: 30 TABLET | Refills: 0 | Status: SHIPPED | OUTPATIENT
Start: 2024-12-18

## 2024-12-18 NOTE — PROGRESS NOTES
"Chief Complaint  Anxiety and Insomnia    Subjective          Frances Rao presents to [facility]  History of Present Illness  Anxiety: feels celexa has helped   Not feeling as anxious as she previously was feeling  Lightheadedness: on and off x 2 wks  She has felt lightheadedness more often the past few months  Sx last a min then resolve  Denies room spinning. Just feels off balance.  She gets blurry vision/blacked out vision when laying to sitting or sitting to standing  Denies syncope, loc, seizures  Drinking 4 bottles of water/day  Eating 2 meals/day  HR was 102 at home  Feels palpitations at times.  Headaches occasionally. More when she was drinking a lot of soda. She has not had one in a few wks.   Denies unilateral weakness, slurred speech, confusion  If she watches tv, vision changes. States she \"sees a line across the screen\"  Eyes look foggy  States she has seen optho for this.    Past Medical History:   Diagnosis Date    Abnormal Pap smear of cervix     Abnormal uterine bleeding (AUB)     Anxiety     Depression 2012    Fibroid     History of sleep walking     Insomnia     Migraine     Orthostatic hypertension 10/21/2024    Ovarian cyst         Past Surgical History:   Procedure Laterality Date    COLPOSCOPY      D & C HYSTEROSCOPY N/A 2/1/2024    Procedure: DILATATION AND CURETTAGE HYSTEROSCOPY;  Surgeon: Afshan Magana DO;  Location: [facility];  Service: Gynecology;  Laterality: N/A;    TOTAL LAPAROSCOPIC HYSTERECTOMY N/A 8/9/2024    Procedure: ROBOTIC ASSISTED LAPAROSCOPIC HYSTERECTOMY WITH BILATERAL SALPINGECTOMY, CYTSOCOPY;  Surgeon: Afshan Magana DO;  Location: [facility] OR;  Service: Robotics - DaVinci;  Laterality: N/A;    WISDOM TOOTH EXTRACTION          Current Outpatient Medications on File Prior to Visit   Medication Sig Dispense Refill    citalopram (CeleXA) 10 MG tablet Take 1 tablet by mouth Daily. 30 tablet 1    cloNIDine " "(CATAPRES) 0.1 MG tablet Take 1 tablet by mouth Every Night. 90 tablet 1    pramipexole (MIRAPEX) 0.5 MG tablet Take 1 tablet by mouth Daily.      traZODone (DESYREL) 100 MG tablet Take 1 tablet by mouth Every Night. 90 tablet 1     No current facility-administered medications on file prior to visit.        No Known Allergies    Social History     Tobacco Use   Smoking Status Every Day    Current packs/day: 0.50    Average packs/day: 0.5 packs/day for 50.4 years (25.2 ttl pk-yrs)    Types: Cigarettes    Start date: 8/12/1996   Smokeless Tobacco Never   Tobacco Comments    Last 1/31/24          Objective   Vital Signs:   /80 (BP Location: Left arm, Patient Position: Sitting, Cuff Size: Adult)   Pulse 74   Temp 97.9 °F (36.6 °C) (Temporal)   Resp 16   Ht 160 cm (63\")   Wt 63 kg (138 lb 12.8 oz)   SpO2 96%   BMI 24.59 kg/m²     Physical Exam  Vitals reviewed.   Constitutional:       Appearance: Normal appearance.   HENT:      Head: Normocephalic and atraumatic.      Nose: Nose normal.      Mouth/Throat:      Mouth: Mucous membranes are moist.   Eyes:      Extraocular Movements: Extraocular movements intact.      Conjunctiva/sclera: Conjunctivae normal.      Pupils: Pupils are equal, round, and reactive to light.   Cardiovascular:      Rate and Rhythm: Normal rate and regular rhythm.   Pulmonary:      Effort: Pulmonary effort is normal.      Breath sounds: Normal breath sounds.   Abdominal:      General: Abdomen is flat. Bowel sounds are normal.      Palpations: Abdomen is soft.   Musculoskeletal:         General: Normal range of motion.   Neurological:      General: No focal deficit present.      Mental Status: She is alert and oriented to person, place, and time.   Psychiatric:         Mood and Affect: Mood normal.        Result Review :            BMI is within normal parameters. No other follow-up for BMI required.              Assessment and Plan    Diagnoses and all orders for this visit:    1. " Lightheadedness (Primary)  Assessment & Plan:  Discussed ddx lightheaded. Will try epley maneuver. increase water intake. discussed importance of 3 meals daily and not skipping meals. Limit/avoid caffeine. Will get CT head due to chronic nature and holter due to palpitations at times. RTC if sx return/worsen, syncope, loc, seizure, cp, palpitations, unilateral weakness, confusion. Pt understands and agrees      Orders:  -     Holter monitor - 24 hour; Future  -     CT Head Without Contrast; Future    2. Vision changes  Comments:  Discussed need to f/u with eye doctor to eval and make sure vision prescription utd.  Orders:  -     CT Head Without Contrast; Future    3. Generalized anxiety disorder  Assessment & Plan:  Anxiety has improved with celexa. Feels this medicine is better than previous she has tried. Would like to continue this dose at this time.      Other orders  -     meclizine (ANTIVERT) 12.5 MG tablet; Take 1 tablet by mouth 3 (Three) Times a Day As Needed for Dizziness.  Dispense: 30 tablet; Refill: 0        Follow Up   Return in about 1 month (around 1/18/2025).  Patient was given instructions and counseling regarding her condition or for health maintenance advice. Please see specific information pulled into the AVS if appropriate.

## 2024-12-19 PROBLEM — R42 LIGHTHEADEDNESS: Status: ACTIVE | Noted: 2024-12-19

## 2024-12-19 NOTE — ASSESSMENT & PLAN NOTE
Discussed ddx lightheaded. Will try epley maneuver. increase water intake. discussed importance of 3 meals daily and not skipping meals. Limit/avoid caffeine. Will get CT head due to chronic nature and holter due to palpitations at times. RTC if sx return/worsen, syncope, loc, seizure, cp, palpitations, unilateral weakness, confusion. Pt understands and agrees

## 2024-12-19 NOTE — ASSESSMENT & PLAN NOTE
Anxiety has improved with celexa. Feels this medicine is better than previous she has tried. Would like to continue this dose at this time.

## 2025-01-03 ENCOUNTER — TELEPHONE (OUTPATIENT)
Dept: INTERNAL MEDICINE | Facility: CLINIC | Age: 45
End: 2025-01-03
Payer: COMMERCIAL

## 2025-01-03 NOTE — TELEPHONE ENCOUNTER
PASSPORT CPT 30570 CT HEAD WO, Tracking # [id number] - DENIED   INFORMATION IN MEDIA CAN DO P2P WITHIN 5 BUSINESS DAYS @ [phone] OPTION 1. DENIAL STATES IT DOES NOT SHOW MEDICAL NEED FOR THIS TEST. THE INFORMATION SENT IN SHOW YOU HAVE DIZZINESS, IT DOESNOT SHOW RESULTS OF BALANCE TESTING AND BLOOD PRESSURE LAYING AND STANDING (ORTHOSTATIC) IT DOES NOT SHOW  RECENT HEART TESTS, BLOOD TEST AND TREATMENT.

## 2025-01-07 ENCOUNTER — TELEPHONE (OUTPATIENT)
Dept: INTERNAL MEDICINE | Facility: CLINIC | Age: 45
End: 2025-01-07
Payer: COMMERCIAL

## 2025-01-07 ENCOUNTER — OFFICE VISIT (OUTPATIENT)
Dept: INTERNAL MEDICINE | Facility: CLINIC | Age: 45
End: 2025-01-07
Payer: COMMERCIAL

## 2025-01-07 VITALS
WEIGHT: 137.38 LBS | RESPIRATION RATE: 20 BRPM | HEART RATE: 95 BPM | DIASTOLIC BLOOD PRESSURE: 68 MMHG | SYSTOLIC BLOOD PRESSURE: 108 MMHG | OXYGEN SATURATION: 95 % | HEIGHT: 63 IN | BODY MASS INDEX: 24.34 KG/M2 | TEMPERATURE: 97.4 F

## 2025-01-07 DIAGNOSIS — R06.2 WHEEZING: ICD-10-CM

## 2025-01-07 DIAGNOSIS — R05.9 COUGH, UNSPECIFIED TYPE: Primary | ICD-10-CM

## 2025-01-07 PROCEDURE — 1126F AMNT PAIN NOTED NONE PRSNT: CPT | Performed by: PHYSICIAN ASSISTANT

## 2025-01-07 PROCEDURE — 1159F MED LIST DOCD IN RCRD: CPT | Performed by: PHYSICIAN ASSISTANT

## 2025-01-07 PROCEDURE — 99213 OFFICE O/P EST LOW 20 MIN: CPT | Performed by: PHYSICIAN ASSISTANT

## 2025-01-07 PROCEDURE — 3074F SYST BP LT 130 MM HG: CPT | Performed by: PHYSICIAN ASSISTANT

## 2025-01-07 PROCEDURE — 1160F RVW MEDS BY RX/DR IN RCRD: CPT | Performed by: PHYSICIAN ASSISTANT

## 2025-01-07 PROCEDURE — 3078F DIAST BP <80 MM HG: CPT | Performed by: PHYSICIAN ASSISTANT

## 2025-01-07 RX ORDER — AZITHROMYCIN 250 MG/1
TABLET, FILM COATED ORAL
Qty: 6 TABLET | Refills: 0 | Status: SHIPPED | OUTPATIENT
Start: 2025-01-07

## 2025-01-07 RX ORDER — ALBUTEROL SULFATE 90 UG/1
2 INHALANT RESPIRATORY (INHALATION) EVERY 4 HOURS PRN
Qty: 8 G | Refills: 2 | Status: SHIPPED | OUTPATIENT
Start: 2025-01-07

## 2025-01-07 NOTE — ASSESSMENT & PLAN NOTE
Will get chest x-ray in office to rule out pneumonia.  Patient likely with COPD versus asthma with acute exacerbation secondary to viral illness.  Will treat with azithromycin as this has atypical antimicrobial and anti-inflammatory properties as well as albuterol inhaler to use as needed.  We will hold off on steroids at this time as patient did not benefit from them previously.  Will go ahead and order PFTs for formal lung testing once symptoms improve.  Follow-up as scheduled with PCP in 2 weeks.  Patient needs to return sooner if her symptoms do not improve or worsen.  If patient develops difficulty breathing she needs to be evaluated in the ER.  Call for any questions or concerns.

## 2025-01-07 NOTE — TELEPHONE ENCOUNTER
Caller: Frances Rao    Relationship: Self    Best call back number: [phone]     What is the best time to reach you: ANY     Who are you requesting to speak with (clinical staff, provider,  specific staff member): REFERRAL       What was the call regarding: CALLER STATED THAT SHE WAS SCHEDULED FOR A AT SCAN THAT WAS CANCELED DUE TO NEEDING AUTHORIZATION     Is it okay if the provider responds through MyChart: NO

## 2025-01-07 NOTE — PROGRESS NOTES
"Chief Complaint  Cough (Started last Monday congestion, cough, chest congestion, hoarse, [facility] and was negative for covid, flu and strep. Took steroids that they prescribed her but she still has a lot of chest congestion and hoarse voice. )    Subjective          Frances Rao presents to [facility]    Patient with cough, congestion, hoarseness.  Symptoms began initially about 10 days ago.  She was evaluated at  with Gabi Duran APRN (01/01/2025) and diagnosed with URI and wheezing.  She was treated with Medrol pack and bromfed.  Since then she has had a lot of chest congestion and hoarseness. No fevers.  She does admit to wheezing but has not had an official diagnosis of asthma.  She has wheezed before but it is worse since she's been sick.  She does smoke about 1/2 ppd x 30 years.      Objective   Vital Signs:   /68 (BP Location: Right arm, Patient Position: Sitting, Cuff Size: Adult)   Pulse 95   Temp 97.4 °F (36.3 °C) (Temporal)   Resp 20   Ht 160 cm (63\")   Wt 62.3 kg (137 lb 6 oz)   SpO2 95%   BMI 24.33 kg/m²     Physical Exam  Vitals reviewed.   Constitutional:       Appearance: Normal appearance. She is well-developed.   HENT:      Head: Normocephalic and atraumatic.      Right Ear: Tympanic membrane, ear canal and external ear normal.      Left Ear: Tympanic membrane, ear canal and external ear normal.      Nose: Nose normal.      Mouth/Throat:      Mouth: Mucous membranes are moist.      Pharynx: Posterior oropharyngeal erythema present.   Eyes:      Conjunctiva/sclera: Conjunctivae normal.      Pupils: Pupils are equal, round, and reactive to light.   Cardiovascular:      Rate and Rhythm: Normal rate and regular rhythm.      Heart sounds: No murmur heard.     No friction rub. No gallop.   Pulmonary:      Effort: Pulmonary effort is normal.      Breath sounds: Wheezing (inspiratory wheezing in lower lobes " bilaterally) present. No rhonchi.   Musculoskeletal:      Cervical back: No tenderness.   Lymphadenopathy:      Cervical: No cervical adenopathy.   Skin:     General: Skin is warm and dry.   Neurological:      Mental Status: She is alert and oriented to person, place, and time.      Cranial Nerves: No cranial nerve deficit.   Psychiatric:         Mood and Affect: Mood and affect normal.         Behavior: Behavior normal.         Thought Content: Thought content normal.         Judgment: Judgment normal.        Result Review :          Procedures      Assessment and Plan    Diagnoses and all orders for this visit:    1. Cough, unspecified type (Primary)  Assessment & Plan:  Will get chest x-ray in office to rule out pneumonia.  Patient likely with COPD versus asthma with acute exacerbation secondary to viral illness.  Will treat with azithromycin as this has atypical antimicrobial and anti-inflammatory properties as well as albuterol inhaler to use as needed.  We will hold off on steroids at this time as patient did not benefit from them previously.  Will go ahead and order PFTs for formal lung testing once symptoms improve.  Follow-up as scheduled with PCP in 2 weeks.  Patient needs to return sooner if her symptoms do not improve or worsen.  If patient develops difficulty breathing she needs to be evaluated in the ER.  Call for any questions or concerns.    Orders:  -     POCT SARS-CoV-2 + Flu Antigen HALEY  -     XR Chest PA & Lateral (In Office)    2. Wheezing  -     Complete PFT - Pre & Post Bronchodilator; Future    Other orders  -     albuterol sulfate  (90 Base) MCG/ACT inhaler; Inhale 2 puffs Every 4 (Four) Hours As Needed for Wheezing.  Dispense: 8 g; Refill: 2  -     azithromycin (Zithromax Z-Cristian) 250 MG tablet; Take 2 tablets by mouth on day 1, then 1 tablet daily on days 2-5  Dispense: 6 tablet; Refill: 0              Follow Up   No follow-ups on file.  Patient was given instructions and counseling  regarding her condition or for health maintenance advice. Please see specific information pulled into the AVS if appropriate.

## 2025-01-07 NOTE — TELEPHONE ENCOUNTER
Called pt to let her know PCP tried to do a P2P on Friday but they needed to call us back On Monday but unfortunately we were closed due to weather. I let pt know if insurance did no call us on Wednesday we would reach out to them again. Pt understood and had no further questions

## 2025-01-08 ENCOUNTER — TELEPHONE (OUTPATIENT)
Dept: INTERNAL MEDICINE | Facility: CLINIC | Age: 45
End: 2025-01-08
Payer: COMMERCIAL

## 2025-01-08 RX ORDER — CITALOPRAM HYDROBROMIDE 10 MG/1
10 TABLET ORAL DAILY
Qty: 30 TABLET | Refills: 1 | Status: SHIPPED | OUTPATIENT
Start: 2025-01-08

## 2025-01-09 ENCOUNTER — OFFICE VISIT (OUTPATIENT)
Dept: OBSTETRICS AND GYNECOLOGY | Facility: CLINIC | Age: 45
End: 2025-01-09
Payer: COMMERCIAL

## 2025-01-09 VITALS
WEIGHT: 137 LBS | BODY MASS INDEX: 24.27 KG/M2 | HEIGHT: 63 IN | DIASTOLIC BLOOD PRESSURE: 88 MMHG | SYSTOLIC BLOOD PRESSURE: 126 MMHG

## 2025-01-09 DIAGNOSIS — Z00.00 ANNUAL PHYSICAL EXAM: Primary | ICD-10-CM

## 2025-01-13 ENCOUNTER — TELEPHONE (OUTPATIENT)
Dept: INTERNAL MEDICINE | Facility: CLINIC | Age: 45
End: 2025-01-13
Payer: COMMERCIAL

## 2025-01-13 NOTE — TELEPHONE ENCOUNTER
Caller: Frances Rao    Relationship to patient: Self    Best call back number: [phone]     Patient is needing: PATIENT WOULD LIKE TO GET A TB TEST DONE FOR WORK AND NEEDS A CALL BACK TO ADVISE IF THIS IS SOMETHING SHE CAN GET DONE AT THE OFFICE.

## 2025-01-13 NOTE — TELEPHONE ENCOUNTER
Spoke with patient and she stated she is getting hired at a  and they a requesting she gets a TB skin test done, I scheduled her for tomorrow.

## 2025-01-14 ENCOUNTER — CLINICAL SUPPORT (OUTPATIENT)
Dept: INTERNAL MEDICINE | Facility: CLINIC | Age: 45
End: 2025-01-14
Payer: COMMERCIAL

## 2025-01-14 DIAGNOSIS — Z11.1 ENCOUNTER FOR PPD TEST: Primary | ICD-10-CM

## 2025-01-14 PROCEDURE — 86580 TB INTRADERMAL TEST: CPT | Performed by: PHYSICIAN ASSISTANT

## 2025-01-14 NOTE — PROGRESS NOTES
Patient came into office for administration of PPD / TB skin test.  Consent signed for test and education provided.  See immunization records for details.  Tolerated well.  Left immediately following.  No 15 min OBS.  Patient to RTC in 48-72 hours per protocol for reading and results.

## 2025-01-16 ENCOUNTER — HOSPITAL ENCOUNTER (OUTPATIENT)
Dept: CT IMAGING | Facility: HOSPITAL | Age: 45
Discharge: HOME | End: 2025-01-16
Admitting: PHYSICIAN ASSISTANT
Payer: COMMERCIAL

## 2025-01-16 ENCOUNTER — CLINICAL SUPPORT (OUTPATIENT)
Dept: INTERNAL MEDICINE | Facility: CLINIC | Age: 45
End: 2025-01-16
Payer: COMMERCIAL

## 2025-01-16 DIAGNOSIS — R42 LIGHTHEADEDNESS: ICD-10-CM

## 2025-01-16 DIAGNOSIS — Z00.00 ANNUAL PHYSICAL EXAM: Primary | ICD-10-CM

## 2025-01-16 DIAGNOSIS — H53.9 VISION CHANGES: ICD-10-CM

## 2025-01-16 LAB
INDURATION: 0 MM (ref 0–10)
TB SKIN TEST: NEGATIVE

## 2025-01-16 PROCEDURE — 70450 CT HEAD/BRAIN W/O DYE: CPT

## 2025-01-16 NOTE — PROGRESS NOTES
Pt came in for reading of TB skin test, see chart for more information. Skin test was negative with 0mm induration

## 2025-01-20 ENCOUNTER — OFFICE VISIT (OUTPATIENT)
Dept: INTERNAL MEDICINE | Facility: CLINIC | Age: 45
End: 2025-01-20
Payer: COMMERCIAL

## 2025-01-20 VITALS
SYSTOLIC BLOOD PRESSURE: 108 MMHG | WEIGHT: 132 LBS | HEART RATE: 58 BPM | DIASTOLIC BLOOD PRESSURE: 68 MMHG | OXYGEN SATURATION: 100 % | RESPIRATION RATE: 16 BRPM | HEIGHT: 63 IN | TEMPERATURE: 97 F | BODY MASS INDEX: 23.39 KG/M2

## 2025-01-20 DIAGNOSIS — F41.1 GENERALIZED ANXIETY DISORDER: ICD-10-CM

## 2025-01-20 DIAGNOSIS — R42 LIGHTHEADEDNESS: ICD-10-CM

## 2025-01-20 DIAGNOSIS — J01.11 ACUTE RECURRENT FRONTAL SINUSITIS: ICD-10-CM

## 2025-01-20 DIAGNOSIS — D50.9 IRON DEFICIENCY ANEMIA, UNSPECIFIED IRON DEFICIENCY ANEMIA TYPE: ICD-10-CM

## 2025-01-20 DIAGNOSIS — Z00.00 ANNUAL PHYSICAL EXAM: Primary | ICD-10-CM

## 2025-01-20 PROBLEM — R05.9 COUGH: Status: RESOLVED | Noted: 2025-01-07 | Resolved: 2025-01-20

## 2025-01-20 PROBLEM — R06.2 WHEEZING: Status: RESOLVED | Noted: 2025-01-07 | Resolved: 2025-01-20

## 2025-01-20 LAB
ALBUMIN SERPL-MCNC: 4.3 G/DL (ref 3.5–5.2)
ALBUMIN/GLOB SERPL: 1.8 G/DL
ALP SERPL-CCNC: 70 U/L (ref 39–117)
ALT SERPL W P-5'-P-CCNC: 20 U/L (ref 1–33)
ANION GAP SERPL CALCULATED.3IONS-SCNC: 11 MMOL/L (ref 5–15)
AST SERPL-CCNC: 16 U/L (ref 1–32)
BASOPHILS # BLD AUTO: 0.03 10*3/MM3 (ref 0–0.2)
BASOPHILS NFR BLD AUTO: 0.5 % (ref 0–1.5)
BILIRUB SERPL-MCNC: 0.3 MG/DL (ref 0–1.2)
BUN SERPL-MCNC: 16 MG/DL (ref 6–20)
BUN/CREAT SERPL: 19.5 (ref 7–25)
CALCIUM SPEC-SCNC: 9.8 MG/DL (ref 8.6–10.5)
CHLORIDE SERPL-SCNC: 104 MMOL/L (ref 98–107)
CHOLEST SERPL-MCNC: 184 MG/DL (ref 0–200)
CO2 SERPL-SCNC: 25 MMOL/L (ref 22–29)
CREAT SERPL-MCNC: 0.82 MG/DL (ref 0.57–1)
DEPRECATED RDW RBC AUTO: 49.2 FL (ref 37–54)
EGFRCR SERPLBLD CKD-EPI 2021: 91.7 ML/MIN/1.73
EOSINOPHIL # BLD AUTO: 0.15 10*3/MM3 (ref 0–0.4)
EOSINOPHIL NFR BLD AUTO: 2.4 % (ref 0.3–6.2)
ERYTHROCYTE [DISTWIDTH] IN BLOOD BY AUTOMATED COUNT: 14.8 % (ref 12.3–15.4)
FERRITIN SERPL-MCNC: 81.7 NG/ML (ref 13–150)
GLOBULIN UR ELPH-MCNC: 2.4 GM/DL
GLUCOSE SERPL-MCNC: 87 MG/DL (ref 65–99)
HCT VFR BLD AUTO: 44.7 % (ref 34–46.6)
HDLC SERPL-MCNC: 37 MG/DL (ref 40–60)
HGB BLD-MCNC: 15.3 G/DL (ref 12–15.9)
IMM GRANULOCYTES # BLD AUTO: 0.02 10*3/MM3 (ref 0–0.05)
IMM GRANULOCYTES NFR BLD AUTO: 0.3 % (ref 0–0.5)
IRON 24H UR-MRATE: 75 MCG/DL (ref 37–145)
IRON SATN MFR SERPL: 16 % (ref 20–50)
LDLC SERPL CALC-MCNC: 131 MG/DL (ref 0–100)
LDLC/HDLC SERPL: 3.52 {RATIO}
LYMPHOCYTES # BLD AUTO: 1.84 10*3/MM3 (ref 0.7–3.1)
LYMPHOCYTES NFR BLD AUTO: 29.6 % (ref 19.6–45.3)
MCH RBC QN AUTO: 31.4 PG (ref 26.6–33)
MCHC RBC AUTO-ENTMCNC: 34.2 G/DL (ref 31.5–35.7)
MCV RBC AUTO: 91.6 FL (ref 79–97)
MONOCYTES # BLD AUTO: 0.39 10*3/MM3 (ref 0.1–0.9)
MONOCYTES NFR BLD AUTO: 6.3 % (ref 5–12)
NEUTROPHILS NFR BLD AUTO: 3.78 10*3/MM3 (ref 1.7–7)
NEUTROPHILS NFR BLD AUTO: 60.9 % (ref 42.7–76)
NRBC BLD AUTO-RTO: 0 /100 WBC (ref 0–0.2)
PLATELET # BLD AUTO: 216 10*3/MM3 (ref 140–450)
PMV BLD AUTO: 9.9 FL (ref 6–12)
POTASSIUM SERPL-SCNC: 4.2 MMOL/L (ref 3.5–5.2)
PROT SERPL-MCNC: 6.7 G/DL (ref 6–8.5)
RBC # BLD AUTO: 4.88 10*6/MM3 (ref 3.77–5.28)
SODIUM SERPL-SCNC: 140 MMOL/L (ref 136–145)
TIBC SERPL-MCNC: 462 MCG/DL (ref 298–536)
TRANSFERRIN SERPL-MCNC: 310 MG/DL (ref 200–360)
TRIGL SERPL-MCNC: 84 MG/DL (ref 0–150)
TSH SERPL DL<=0.05 MIU/L-ACNC: 1.16 UIU/ML (ref 0.27–4.2)
VLDLC SERPL-MCNC: 16 MG/DL (ref 5–40)
WBC NRBC COR # BLD AUTO: 6.21 10*3/MM3 (ref 3.4–10.8)

## 2025-01-20 PROCEDURE — 1160F RVW MEDS BY RX/DR IN RCRD: CPT | Performed by: PHYSICIAN ASSISTANT

## 2025-01-20 PROCEDURE — 1126F AMNT PAIN NOTED NONE PRSNT: CPT | Performed by: PHYSICIAN ASSISTANT

## 2025-01-20 PROCEDURE — 99396 PREV VISIT EST AGE 40-64: CPT | Performed by: PHYSICIAN ASSISTANT

## 2025-01-20 PROCEDURE — 1159F MED LIST DOCD IN RCRD: CPT | Performed by: PHYSICIAN ASSISTANT

## 2025-01-20 PROCEDURE — 36415 COLL VENOUS BLD VENIPUNCTURE: CPT | Performed by: PHYSICIAN ASSISTANT

## 2025-01-20 PROCEDURE — 3074F SYST BP LT 130 MM HG: CPT | Performed by: PHYSICIAN ASSISTANT

## 2025-01-20 PROCEDURE — 3078F DIAST BP <80 MM HG: CPT | Performed by: PHYSICIAN ASSISTANT

## 2025-01-20 PROCEDURE — 84466 ASSAY OF TRANSFERRIN: CPT | Performed by: PHYSICIAN ASSISTANT

## 2025-01-20 PROCEDURE — 85025 COMPLETE CBC W/AUTO DIFF WBC: CPT | Performed by: PHYSICIAN ASSISTANT

## 2025-01-20 PROCEDURE — 80061 LIPID PANEL: CPT | Performed by: PHYSICIAN ASSISTANT

## 2025-01-20 PROCEDURE — 83540 ASSAY OF IRON: CPT | Performed by: PHYSICIAN ASSISTANT

## 2025-01-20 PROCEDURE — 84443 ASSAY THYROID STIM HORMONE: CPT | Performed by: PHYSICIAN ASSISTANT

## 2025-01-20 PROCEDURE — 82728 ASSAY OF FERRITIN: CPT | Performed by: PHYSICIAN ASSISTANT

## 2025-01-20 PROCEDURE — 80053 COMPREHEN METABOLIC PANEL: CPT | Performed by: PHYSICIAN ASSISTANT

## 2025-01-20 RX ORDER — CETIRIZINE HYDROCHLORIDE 10 MG/1
10 TABLET ORAL DAILY
Qty: 30 TABLET | Refills: 1 | Status: SHIPPED | OUTPATIENT
Start: 2025-01-20

## 2025-01-20 RX ORDER — FLUTICASONE PROPIONATE 50 MCG
2 SPRAY, SUSPENSION (ML) NASAL DAILY
Qty: 11.1 G | Refills: 1 | Status: SHIPPED | OUTPATIENT
Start: 2025-01-20

## 2025-01-20 NOTE — ASSESSMENT & PLAN NOTE
Discussed ddx. Reviewed CT head showing sinus sx. Will tx with augmentin for acute symptoms of cough/congestion. Will refer to ENT and allergist for eval due to chronic nature. Pt has upcoming appt for holter. Pt understand and agrees with plan.

## 2025-01-20 NOTE — PROGRESS NOTES
Chief Complaint  Phy and f/u  Anxiety, Dizziness, and vision changes    Subjective          Frances Rao presents to [facility]  History of Present Illness  Pt here for follow up physical and follow up   She is having flares of lightheadedness but none significant enough to try the meclizine  Sx last a few seconds  Denies syncope, loc, unilateral weakness  No vision changes since last visit  Drinking 5-6 bottles of water/day  Eating 3 times/day  Has sinus pressure that comes and goes   Has allergic sx, nasal congestion that comes and goes   Still has cough in chest   Denies wheezing, resp distress, sob  Feels anxiety is doing well     Past Medical History:   Diagnosis Date    Abnormal Pap smear of cervix     Abnormal uterine bleeding (AUB)     Anxiety     Depression 2012    Fibroid     History of sleep walking     Insomnia     Migraine     Orthostatic hypertension 10/21/2024    Ovarian cyst         Past Surgical History:   Procedure Laterality Date    COLPOSCOPY      D & C HYSTEROSCOPY N/A 02/01/2024    Procedure: DILATATION AND CURETTAGE HYSTEROSCOPY;  Surgeon: Afshan Magana DO;  Location: [facility];  Service: Gynecology;  Laterality: N/A;    HYSTERECTOMY      TOTAL LAPAROSCOPIC HYSTERECTOMY N/A 08/09/2024    Procedure: ROBOTIC ASSISTED LAPAROSCOPIC HYSTERECTOMY WITH BILATERAL SALPINGECTOMY, CYTSOCOPY;  Surgeon: Afshan Magana DO;  Location: [facility] OR;  Service: Robotics - DaVinci;  Laterality: N/A;    WISDOM TOOTH EXTRACTION          Current Outpatient Medications on File Prior to Visit   Medication Sig Dispense Refill    albuterol sulfate  (90 Base) MCG/ACT inhaler Inhale 2 puffs Every 4 (Four) Hours As Needed for Wheezing. 8 g 2    citalopram (CeleXA) 10 MG tablet TAKE 1 TABLET BY MOUTH DAILY 30 tablet 1    cloNIDine (CATAPRES) 0.1 MG tablet Take 1 tablet by mouth Every Night. 90 tablet 1    meclizine (ANTIVERT) 12.5 MG tablet Take 1 tablet  "by mouth 3 (Three) Times a Day As Needed for Dizziness. 30 tablet 0    pramipexole (MIRAPEX) 0.25 MG tablet       traZODone (DESYREL) 100 MG tablet Take 1 tablet by mouth Every Night. 90 tablet 1    [DISCONTINUED] azithromycin (Zithromax Z-Cristian) 250 MG tablet Take 2 tablets by mouth on day 1, then 1 tablet daily on days 2-5 6 tablet 0    [DISCONTINUED] brompheniramine-pseudoephedrine-DM 30-2-10 MG/5ML syrup Take 10 mL by mouth Every 6 (Six) Hours As Needed for Allergies. (Patient not taking: Reported on 1/7/2025) 150 mL 0    [DISCONTINUED] methylPREDNISolone (MEDROL) 4 MG dose pack Take as directed on package instructions. (Patient not taking: Reported on 1/7/2025) 21 tablet 0     No current facility-administered medications on file prior to visit.        No Known Allergies    Social History     Tobacco Use   Smoking Status Every Day    Current packs/day: 0.50    Average packs/day: 0.5 packs/day for 50.4 years (25.2 ttl pk-yrs)    Types: Cigarettes    Start date: 8/12/1996   Smokeless Tobacco Never   Tobacco Comments    Last 1/31/24          Objective   Vital Signs:   /68 (BP Location: Right arm, Patient Position: Sitting, Cuff Size: Adult)   Pulse 58   Temp 97 °F (36.1 °C) (Temporal)   Resp 16   Ht 160 cm (63\")   Wt 59.9 kg (132 lb)   SpO2 100%   BMI 23.38 kg/m²     Physical Exam  Vitals reviewed.   Constitutional:       Appearance: Normal appearance.   HENT:      Head: Normocephalic and atraumatic.      Nose: Nose normal.      Mouth/Throat:      Mouth: Mucous membranes are moist.   Eyes:      Extraocular Movements: Extraocular movements intact.      Conjunctiva/sclera: Conjunctivae normal.      Pupils: Pupils are equal, round, and reactive to light.   Cardiovascular:      Rate and Rhythm: Normal rate and regular rhythm.   Pulmonary:      Effort: Pulmonary effort is normal.      Breath sounds: Normal breath sounds.   Abdominal:      General: Abdomen is flat. Bowel sounds are normal.      Palpations: " Abdomen is soft.   Musculoskeletal:         General: Normal range of motion.   Neurological:      General: No focal deficit present.      Mental Status: She is alert and oriented to person, place, and time.   Psychiatric:         Mood and Affect: Mood normal.        Result Review :   The following data was reviewed by: Maria Guadalupe Francisco PA-C on 01/20/2025:  Common labs          8/9/2024    06:08 10/3/2024    11:55 10/21/2024    10:22   Common Labs   Glucose  69  106    BUN  8  12    Creatinine  1.09  0.96    Sodium  138  140    Potassium  3.9  4.1    Chloride  100  104    Calcium  10.7  10.3    Albumin  4.8  4.5    Total Bilirubin  0.4  0.3    Alkaline Phosphatase  81  77    AST (SGOT)  11  14    ALT (SGPT)  15  18    WBC 7.10   7.19    Hemoglobin 14.9   14.8    Hematocrit 45.0   45.1    Platelets 212   235    Total Cholesterol  217     Triglycerides  99     HDL Cholesterol  35     LDL Cholesterol   164       Data reviewed : Radiologic studies ct head      BMI is within normal parameters. No other follow-up for BMI required.              Assessment and Plan    Diagnoses and all orders for this visit:    1. Annual physical exam (Primary)  Assessment & Plan:  Reviewed preventative medication recommendations that are age appropriate for the patient. Education provided for health and wellness. Encouraged healthy diet, regular exercise, and routine wellness checkups.        2. Acute recurrent frontal sinusitis  -     Ambulatory Referral to ENT (Otolaryngology)  -     Ambulatory Referral to Allergy    3. Lightheadedness  Assessment & Plan:  Discussed ddx. Reviewed CT head showing sinus sx. Will tx with augmentin for acute symptoms of cough/congestion. Will refer to ENT and allergist for eval due to chronic nature. Pt has upcoming appt for holter. Pt understand and agrees with plan.    Orders:  -     Ambulatory Referral to ENT (Otolaryngology)  -     Ambulatory Referral to Allergy  -     Comprehensive Metabolic Panel  -      CBC & Differential  -     TSH  -     Lipid Panel    4. Generalized anxiety disorder  Comments:  stable, cont current medicine    5. Iron deficiency anemia, unspecified iron deficiency anemia type  Comments:  Labs today to eval  Orders:  -     Ferritin  -     Iron Profile    Other orders  -     cetirizine (zyrTEC) 10 MG tablet; Take 1 tablet by mouth Daily.  Dispense: 30 tablet; Refill: 1  -     fluticasone (FLONASE) 50 MCG/ACT nasal spray; Administer 2 sprays into the nostril(s) as directed by provider Daily.  Dispense: 11.1 g; Refill: 1  -     amoxicillin-clavulanate (AUGMENTIN) 875-125 MG per tablet; Take 1 tablet by mouth 2 (Two) Times a Day for 10 days.  Dispense: 20 tablet; Refill: 0        Follow Up   Return in about 6 weeks (around 3/3/2025).  Patient was given instructions and counseling regarding her condition or for health maintenance advice. Please see specific information pulled into the AVS if appropriate.

## 2025-02-18 ENCOUNTER — HOSPITAL ENCOUNTER (OUTPATIENT)
Facility: HOSPITAL | Age: 45
Discharge: HOME | End: 2025-02-18
Admitting: PHYSICIAN ASSISTANT
Payer: COMMERCIAL

## 2025-02-18 DIAGNOSIS — R42 LIGHTHEADEDNESS: ICD-10-CM

## 2025-02-18 PROCEDURE — 93225 XTRNL ECG REC<48 HRS REC: CPT

## 2025-02-20 LAB
CV ZIO BASELINE AVG BPM: 71
CV ZIO BASELINE BPM HIGH: 125
CV ZIO BASELINE BPM LOW: 35

## 2025-03-05 ENCOUNTER — HOSPITAL ENCOUNTER (OUTPATIENT)
Dept: RESPIRATORY THERAPY | Facility: HOSPITAL | Age: 45
Discharge: HOME | End: 2025-03-05
Payer: COMMERCIAL

## 2025-03-05 DIAGNOSIS — R06.2 WHEEZING: ICD-10-CM

## 2025-03-05 PROCEDURE — 94726 PLETHYSMOGRAPHY LUNG VOLUMES: CPT

## 2025-03-05 PROCEDURE — 94729 DIFFUSING CAPACITY: CPT

## 2025-03-05 PROCEDURE — 94060 EVALUATION OF WHEEZING: CPT

## 2025-03-05 RX ORDER — ALBUTEROL SULFATE 0.83 MG/ML
2.5 SOLUTION RESPIRATORY (INHALATION) ONCE
Status: COMPLETED | OUTPATIENT
Start: 2025-03-05 | End: 2025-03-05

## 2025-03-05 RX ORDER — ALBUTEROL SULFATE 0.83 MG/ML
SOLUTION RESPIRATORY (INHALATION)
Status: COMPLETED
Start: 2025-03-05 | End: 2025-03-05

## 2025-03-05 RX ADMIN — ALBUTEROL SULFATE 2.5 MG: 2.5 SOLUTION RESPIRATORY (INHALATION) at 11:47

## 2025-03-05 RX ADMIN — ALBUTEROL SULFATE 2.5 MG: 0.83 SOLUTION RESPIRATORY (INHALATION) at 11:47

## 2025-03-06 ENCOUNTER — OFFICE VISIT (OUTPATIENT)
Dept: BEHAVIORAL HEALTH | Facility: CLINIC | Age: 45
End: 2025-03-06
Payer: COMMERCIAL

## 2025-03-06 VITALS
SYSTOLIC BLOOD PRESSURE: 108 MMHG | DIASTOLIC BLOOD PRESSURE: 72 MMHG | HEART RATE: 60 BPM | HEIGHT: 63 IN | WEIGHT: 140.4 LBS | BODY MASS INDEX: 24.88 KG/M2

## 2025-03-06 DIAGNOSIS — G47.00 INSOMNIA, UNSPECIFIED TYPE: ICD-10-CM

## 2025-03-06 DIAGNOSIS — F33.2 SEVERE EPISODE OF RECURRENT MAJOR DEPRESSIVE DISORDER, WITHOUT PSYCHOTIC FEATURES: Primary | ICD-10-CM

## 2025-03-06 DIAGNOSIS — F17.210 CIGARETTE NICOTINE DEPENDENCE WITHOUT COMPLICATION: ICD-10-CM

## 2025-03-06 DIAGNOSIS — F41.1 GENERALIZED ANXIETY DISORDER: ICD-10-CM

## 2025-03-06 RX ORDER — HYDROXYZINE HYDROCHLORIDE 25 MG/1
TABLET, FILM COATED ORAL
COMMUNITY
Start: 2025-02-03

## 2025-03-06 RX ORDER — CITALOPRAM HYDROBROMIDE 20 MG/1
20 TABLET ORAL EVERY MORNING
Qty: 30 TABLET | Refills: 0 | Status: SHIPPED | OUTPATIENT
Start: 2025-03-06

## 2025-03-06 RX ORDER — QUETIAPINE FUMARATE 100 MG/1
TABLET, FILM COATED ORAL
Qty: 60 TABLET | Refills: 0 | Status: SHIPPED | OUTPATIENT
Start: 2025-03-06

## 2025-03-06 RX ORDER — ALBUTEROL SULFATE AND BUDESONIDE 90; 80 UG/1; UG/1
AEROSOL, METERED RESPIRATORY (INHALATION)
COMMUNITY
Start: 2025-02-17

## 2025-03-06 NOTE — PROGRESS NOTES
Chief Complaint:  Depression     History of Present Illness: Frances Rao is a 44 y.o. female who presents today referred by PCP Maria Guadalupe Francisco PA-C. Pt c/o depression that is constant, rates it a 10/10. No anhedonia, pt enjoys cooking and watching TV. No hopelessness. Pt denies having any current SI or HI at this time. Pt admits to intermittent SI that involves passive and fleeting thoughts, occurs a few times a month. Pt reports having SI since 2012 after her grandmother passed. Pt states she believes suicide is a sin and would never do this to her family. Pt denies any intent with SI and states she will never act upon this. Last SI was a few days ago. Pt has access to firearms. No h/o suicide attempt or self harm. She has difficulty staying asleep despite taking trazodone 100mg. No symptoms of darnell/hypomania. Pt c/o anxiety that consists of worrying about everything, constant, rates it a 5-6/10. Pt recently started a job and expects anxiety to improve. No panic attacks. She also c/o irritability. No symptoms of OCD or PTSD. Pt denies AVH. No restrictive eating, binge eating, or purging.  Patient states she thinks Celexa has helped her anxiety some.      Medical Record Review: Reviewed office visit note from 11/18/2024, history of orthostatic hypotension, has increased water to 1 bottle per day.  Patient complains of insomnia and does not think trazodone is working.  Patient has anxiety she has increased since restarting work.  She midst of feeling down and sad.  Patient is going through relationship issues.  Patient denies SI and HI.  History of RLS, Anxiety, depression, migraine      PHQ-9 Depression Screening  Little interest or pleasure in doing things? Several days   Feeling down, depressed, or hopeless? Several days   PHQ-2 Total Score 2   Trouble falling or staying asleep, or sleeping too much? Over half   Feeling tired or having little energy? Several days   Poor appetite or overeating? Not at all    Feeling bad about yourself - or that you are a failure or have let yourself or your family down? Over half   Trouble concentrating on things, such as reading the newspaper or watching television? Several days   Moving or speaking so slowly that other people could have noticed? Or the opposite - being so fidgety or restless that you have been moving around a lot more than usual? Not at all   Thoughts that you would be better off dead, or of hurting yourself in some way? Several days   PHQ-9 Total Score 9   If you checked off any problems, how difficult have these problems made it for you to do your work, take care of things at home, or get along with other people? Somewhat difficult         HELENE-7           ROS:  Review of Systems   Constitutional:  Positive for fatigue. Negative for appetite change and unexpected weight change.   HENT:  Positive for tinnitus. Negative for drooling.    Eyes:  Negative for visual disturbance.   Respiratory:  Positive for chest tightness and shortness of breath. Negative for cough.    Cardiovascular:  Positive for chest pain and palpitations.   Gastrointestinal:  Positive for abdominal pain, diarrhea and nausea. Negative for constipation and vomiting.   Endocrine: Positive for heat intolerance. Negative for cold intolerance.   Genitourinary:  Negative for difficulty urinating.   Musculoskeletal:  Positive for arthralgias, back pain and myalgias.   Skin:  Negative for rash.   Neurological:  Positive for dizziness, syncope and headaches.   Psychiatric/Behavioral:  Positive for agitation, dysphoric mood, self-injury, sleep disturbance and suicidal ideas. The patient is nervous/anxious.        Problem List:  Patient Active Problem List   Diagnosis    Cigarette nicotine dependence without complication    Thickened endometrium    Hot flashes    Restless legs syndrome (RLS)    Insomnia due to medical condition    Abnormal uterine bleeding (AUB)    Uterine leiomyoma    Orthostatic  hypertension    Syncope    Rectal bleed    Generalized anxiety disorder    Lightheadedness       Current Medications:   Current Outpatient Medications   Medication Sig Dispense Refill    Airsupra 90-80 MCG/ACT aerosol       albuterol sulfate  (90 Base) MCG/ACT inhaler Inhale 2 puffs Every 4 (Four) Hours As Needed for Wheezing. 8 g 2    cetirizine (zyrTEC) 10 MG tablet Take 1 tablet by mouth Daily. 30 tablet 1    cloNIDine (CATAPRES) 0.1 MG tablet Take 1 tablet by mouth Every Night. 90 tablet 1    fluticasone (FLONASE) 50 MCG/ACT nasal spray Administer 2 sprays into the nostril(s) as directed by provider Daily. 11.1 g 1    hydrOXYzine (ATARAX) 25 MG tablet       meclizine (ANTIVERT) 12.5 MG tablet Take 1 tablet by mouth 3 (Three) Times a Day As Needed for Dizziness. 30 tablet 0    pramipexole (MIRAPEX) 0.25 MG tablet       traZODone (DESYREL) 100 MG tablet Take 1 tablet by mouth Every Night. 90 tablet 1    citalopram (CeleXA) 20 MG tablet Take 1 tablet by mouth Every Morning. 30 tablet 0    QUEtiapine (SEROquel) 100 MG tablet Take 1 to 2 tab PO QHS PRN sleep 60 tablet 0     No current facility-administered medications for this visit.       Discontinued Medications:  Medications Discontinued During This Encounter   Medication Reason    citalopram (CeleXA) 10 MG tablet        Allergy:   No Known Allergies     Past Medical History:  Past Medical History:   Diagnosis Date    Abnormal Pap smear of cervix     Abnormal uterine bleeding (AUB)     Anxiety     Depression 2012    Fibroid     History of sleep walking     Insomnia     Migraine     Orthostatic hypertension 10/21/2024    Ovarian cyst        Past Surgical History:  Past Surgical History:   Procedure Laterality Date    COLPOSCOPY      D & C HYSTEROSCOPY N/A 02/01/2024    Procedure: DILATATION AND CURETTAGE HYSTEROSCOPY;  Surgeon: Afshan Magana DO;  Location: [facility] MAIN OR;  Service: Gynecology;  Laterality: N/A;    HYSTERECTOMY      TOTAL LAPAROSCOPIC  HYSTERECTOMY N/A 08/09/2024    Procedure: ROBOTIC ASSISTED LAPAROSCOPIC HYSTERECTOMY WITH BILATERAL SALPINGECTOMY, CYTSOCOPY;  Surgeon: Afshan Magana DO;  Location: [facility] MAIN OR;  Service: Robotics - DaVinci;  Laterality: N/A;    WISDOM TOOTH EXTRACTION         Past Psychiatric History:  Began Treatment: 2 years ago   Diagnoses: Anxiety  Psychiatrist: Denies  Therapist: Denies  Admission History: Denies  Medications/Treatment: Celexa, Clonidine, Trazodone, Hydroxyzine, effexor  Self Harm: Denies  Suicide Attempts: Denies  Postpartum depression: Denies    Family Psychiatric History:   Diagnoses: Her half brother has a h/o bipolar.   Substance use: Her pat grandfather and mat grandfather had a h/o EtOH abuse.   Suicide Attempts/Completions: Denies    Family History   Problem Relation Age of Onset    COPD Mother     Restless legs syndrome Mother     Anxiety disorder Mother     Arthritis Mother     COPD Father     Insomnia Father     Arthritis Father     Breast cancer Paternal Great-Grandmother     Breast cancer Maternal Great-Grandmother     Breast cancer Maternal Grandmother     Breast cancer Paternal Grandmother     Uterine cancer Neg Hx     Ovarian cancer Neg Hx     Colon cancer Neg Hx     Melanoma Neg Hx     Prostate cancer Neg Hx        Substance Abuse History:   Alcohol use: Pt will a few shots of whiskey, has been nightly for the past few weeks.   Nicotine: Pt smokes 0.5PPD.   Illicit Drug Use: Pt smokes marijuana.   Longest Period Sober: 2 years from EtOH.   Rehab/AA/NA: Denies    Social History:  Living Situation: Pt lives with her father.   Marital/Relationship History: Single  Children: Denies  Work History/Occupation: Pt works as [employer].   Education: Pt completed high school, no college.    History: Denies  Legal: Denies    Social History     Socioeconomic History    Marital status: Single   Tobacco Use    Smoking status: Every Day     Current packs/day: 0.50     Average packs/day: 0.5  "packs/day for 50.6 years (25.3 ttl pk-yrs)     Types: Cigarettes     Start date: 8/12/1996    Smokeless tobacco: Never    Tobacco comments:     Last 1/31/24   Vaping Use    Vaping status: Former    Substances: Nicotine, Flavoring    Devices: Disposable   Substance and Sexual Activity    Alcohol use: Yes    Drug use: Yes     Types: Marijuana    Sexual activity: Not Currently     Partners: Male     Birth control/protection: None, Hysterectomy       Developmental History:   Place of birth: [facility]  Siblings: 1 half brother  Childhood: Pt was raised by her father since 18 months old, would see her mother on the weekend. No abuse, trauma, or neglect.       Physical Exam:  Physical Exam    Appearance: Well-groomed with adequate hygiene, appears to be of stated age. Casually and neatly dressed, maintains good eye contact.   Behavior: Appropriate, cooperative. No acute distress.  Motor: No abnormal movements, tics or tremors are noted. No psychomotor agitation or retardation.  Speech: Coherent, spontaneous, appropriate with normal rate, volume, rhythm, and tone. Normal reaction time to questions.  Hyperverbal, no pressured speech  Mood: \"I'm good\"  Affect: Patient appears depressed and is tearful.  Patient appears anxious at times  Thought content: Negative suicidal ideations, negative homicidal ideations. Patient denies any obsession, compulsion, or phobia. No evidence of delusions.  Perceptions: Negative auditory hallucinations, negative visual hallucinations. Pt does not appear to be actively responding to internal stimuli.   Thought process: Logical, goal-directed, coherent, and linear with no evidence of flight of ideas, looseness of associations, thought blocking, or tangentiality.  Circumstantiality  Insight/Judgement: Fair/fair  Cognition: Alert and oriented to person, place, and date. Memory intact for recent and remote events. Attention and concentration intact.     Vital Signs:   /72   Pulse 60   " "Ht 160 cm (62.99\")   Wt 63.7 kg (140 lb 6.4 oz)   BMI 24.88 kg/m²      Lab Results:   Hospital Outpatient Visit on 02/18/2025   Component Date Value Ref Range Status    Heart rate (average) 02/18/2025 71   Final    Heart rate minimum 02/18/2025 35   Final    Heart rate maximum 02/18/2025 125   Final   Office Visit on 01/20/2025   Component Date Value Ref Range Status    Glucose 01/20/2025 87  65 - 99 mg/dL Final    BUN 01/20/2025 16  6 - 20 mg/dL Final    Creatinine 01/20/2025 0.82  0.57 - 1.00 mg/dL Final    Sodium 01/20/2025 140  136 - 145 mmol/L Final    Potassium 01/20/2025 4.2  3.5 - 5.2 mmol/L Final    Chloride 01/20/2025 104  98 - 107 mmol/L Final    CO2 01/20/2025 25.0  22.0 - 29.0 mmol/L Final    Calcium 01/20/2025 9.8  8.6 - 10.5 mg/dL Final    Total Protein 01/20/2025 6.7  6.0 - 8.5 g/dL Final    Albumin 01/20/2025 4.3  3.5 - 5.2 g/dL Final    ALT (SGPT) 01/20/2025 20  1 - 33 U/L Final    AST (SGOT) 01/20/2025 16  1 - 32 U/L Final    Alkaline Phosphatase 01/20/2025 70  39 - 117 U/L Final    Total Bilirubin 01/20/2025 0.3  0.0 - 1.2 mg/dL Final    Globulin 01/20/2025 2.4  gm/dL Final    A/G Ratio 01/20/2025 1.8  g/dL Final    BUN/Creatinine Ratio 01/20/2025 19.5  7.0 - 25.0 Final    Anion Gap 01/20/2025 11.0  5.0 - 15.0 mmol/L Final    eGFR 01/20/2025 91.7  >60.0 mL/min/1.73 Final    TSH 01/20/2025 1.160  0.270 - 4.200 uIU/mL Final    Total Cholesterol 01/20/2025 184  0 - 200 mg/dL Final    Triglycerides 01/20/2025 84  0 - 150 mg/dL Final    HDL Cholesterol 01/20/2025 37 (L)  40 - 60 mg/dL Final    LDL Cholesterol  01/20/2025 131 (H)  0 - 100 mg/dL Final    VLDL Cholesterol 01/20/2025 16  5 - 40 mg/dL Final    LDL/HDL Ratio 01/20/2025 3.52   Final    Ferritin 01/20/2025 81.70  13.00 - 150.00 ng/mL Final    Iron 01/20/2025 75  37 - 145 mcg/dL Final    Iron Saturation (TSAT) 01/20/2025 16 (L)  20 - 50 % Final    Transferrin 01/20/2025 310  200 - 360 mg/dL Final    TIBC 01/20/2025 462  298 - 536 mcg/dL " Final    WBC 01/20/2025 6.21  3.40 - 10.80 10*3/mm3 Final    RBC 01/20/2025 4.88  3.77 - 5.28 10*6/mm3 Final    Hemoglobin 01/20/2025 15.3  12.0 - 15.9 g/dL Final    Hematocrit 01/20/2025 44.7  34.0 - 46.6 % Final    MCV 01/20/2025 91.6  79.0 - 97.0 fL Final    MCH 01/20/2025 31.4  26.6 - 33.0 pg Final    MCHC 01/20/2025 34.2  31.5 - 35.7 g/dL Final    RDW 01/20/2025 14.8  12.3 - 15.4 % Final    RDW-SD 01/20/2025 49.2  37.0 - 54.0 fl Final    MPV 01/20/2025 9.9  6.0 - 12.0 fL Final    Platelets 01/20/2025 216  140 - 450 10*3/mm3 Final    Neutrophil % 01/20/2025 60.9  42.7 - 76.0 % Final    Lymphocyte % 01/20/2025 29.6  19.6 - 45.3 % Final    Monocyte % 01/20/2025 6.3  5.0 - 12.0 % Final    Eosinophil % 01/20/2025 2.4  0.3 - 6.2 % Final    Basophil % 01/20/2025 0.5  0.0 - 1.5 % Final    Immature Grans % 01/20/2025 0.3  0.0 - 0.5 % Final    Neutrophils, Absolute 01/20/2025 3.78  1.70 - 7.00 10*3/mm3 Final    Lymphocytes, Absolute 01/20/2025 1.84  0.70 - 3.10 10*3/mm3 Final    Monocytes, Absolute 01/20/2025 0.39  0.10 - 0.90 10*3/mm3 Final    Eosinophils, Absolute 01/20/2025 0.15  0.00 - 0.40 10*3/mm3 Final    Basophils, Absolute 01/20/2025 0.03  0.00 - 0.20 10*3/mm3 Final    Immature Grans, Absolute 01/20/2025 0.02  0.00 - 0.05 10*3/mm3 Final    nRBC 01/20/2025 0.0  0.0 - 0.2 /100 WBC Final   Clinical Support on 01/14/2025   Component Date Value Ref Range Status    TB Skin Test 01/16/2025 Negative   Final    Induration 01/16/2025 0  0 - 10 mm Final   Admission on 01/01/2025, Discharged on 01/01/2025   Component Date Value Ref Range Status    Rapid Strep A Screen 01/01/2025 Negative   Final    Internal Control 01/01/2025 Passed   Final    Lot Number 01/01/2025 [id number]   Final    Expiration Date 01/01/2025 12,112,026   Final    SARS Antigen 01/01/2025 Not Detected  Not Detected, Presumptive Negative Final    Influenza A Antigen HALEY 01/01/2025 Not Detected  Not Detected Final    Influenza B Antigen HALEY 01/01/2025 Not  Detected  Not Detected Final    Internal Control 01/01/2025 Passed  Passed Final    Lot Number 01/01/2025 [id number]   Final    Expiration Date 01/01/2025 10,876,052   Final   Office Visit on 10/21/2024   Component Date Value Ref Range Status    Glucose 10/21/2024 106 (H)  65 - 99 mg/dL Final    BUN 10/21/2024 12  6 - 20 mg/dL Final    Creatinine 10/21/2024 0.96  0.57 - 1.00 mg/dL Final    Sodium 10/21/2024 140  136 - 145 mmol/L Final    Potassium 10/21/2024 4.1  3.5 - 5.2 mmol/L Final    Chloride 10/21/2024 104  98 - 107 mmol/L Final    CO2 10/21/2024 24.6  22.0 - 29.0 mmol/L Final    Calcium 10/21/2024 10.3  8.6 - 10.5 mg/dL Final    Total Protein 10/21/2024 7.4  6.0 - 8.5 g/dL Final    Albumin 10/21/2024 4.5  3.5 - 5.2 g/dL Final    ALT (SGPT) 10/21/2024 18  1 - 33 U/L Final    AST (SGOT) 10/21/2024 14  1 - 32 U/L Final    Alkaline Phosphatase 10/21/2024 77  39 - 117 U/L Final    Total Bilirubin 10/21/2024 0.3  0.0 - 1.2 mg/dL Final    Globulin 10/21/2024 2.9  gm/dL Final    A/G Ratio 10/21/2024 1.6  g/dL Final    BUN/Creatinine Ratio 10/21/2024 12.5  7.0 - 25.0 Final    Anion Gap 10/21/2024 11.4  5.0 - 15.0 mmol/L Final    eGFR 10/21/2024 75.9  >60.0 mL/min/1.73 Final    Ferritin 10/21/2024 37.10  13.00 - 150.00 ng/mL Final    Iron 10/21/2024 48  37 - 145 mcg/dL Final    Iron Saturation (TSAT) 10/21/2024 11 (L)  20 - 50 % Final    Transferrin 10/21/2024 300  200 - 360 mg/dL Final    TIBC 10/21/2024 447  298 - 536 mcg/dL Final    WBC 10/21/2024 7.19  3.40 - 10.80 10*3/mm3 Final    RBC 10/21/2024 5.03  3.77 - 5.28 10*6/mm3 Final    Hemoglobin 10/21/2024 14.8  12.0 - 15.9 g/dL Final    Hematocrit 10/21/2024 45.1  34.0 - 46.6 % Final    MCV 10/21/2024 89.7  79.0 - 97.0 fL Final    MCH 10/21/2024 29.4  26.6 - 33.0 pg Final    MCHC 10/21/2024 32.8  31.5 - 35.7 g/dL Final    RDW 10/21/2024 13.4  12.3 - 15.4 % Final    RDW-SD 10/21/2024 43.8  37.0 - 54.0 fl Final    MPV 10/21/2024 10.3  6.0 - 12.0 fL Final    Platelets  10/21/2024 235  140 - 450 10*3/mm3 Final    Neutrophil % 10/21/2024 63.0  42.7 - 76.0 % Final    Lymphocyte % 10/21/2024 27.7  19.6 - 45.3 % Final    Monocyte % 10/21/2024 5.4  5.0 - 12.0 % Final    Eosinophil % 10/21/2024 3.2  0.3 - 6.2 % Final    Basophil % 10/21/2024 0.6  0.0 - 1.5 % Final    Immature Grans % 10/21/2024 0.1  0.0 - 0.5 % Final    Neutrophils, Absolute 10/21/2024 4.53  1.70 - 7.00 10*3/mm3 Final    Lymphocytes, Absolute 10/21/2024 1.99  0.70 - 3.10 10*3/mm3 Final    Monocytes, Absolute 10/21/2024 0.39  0.10 - 0.90 10*3/mm3 Final    Eosinophils, Absolute 10/21/2024 0.23  0.00 - 0.40 10*3/mm3 Final    Basophils, Absolute 10/21/2024 0.04  0.00 - 0.20 10*3/mm3 Final    Immature Grans, Absolute 10/21/2024 0.01  0.00 - 0.05 10*3/mm3 Final    nRBC 10/21/2024 0.0  0.0 - 0.2 /100 WBC Final   Office Visit on 10/03/2024   Component Date Value Ref Range Status    Glucose 10/03/2024 69  65 - 99 mg/dL Final    BUN 10/03/2024 8  6 - 20 mg/dL Final    Creatinine 10/03/2024 1.09 (H)  0.57 - 1.00 mg/dL Final    Sodium 10/03/2024 138  136 - 145 mmol/L Final    Potassium 10/03/2024 3.9  3.5 - 5.2 mmol/L Final    Chloride 10/03/2024 100  98 - 107 mmol/L Final    CO2 10/03/2024 27.0  22.0 - 29.0 mmol/L Final    Calcium 10/03/2024 10.7 (H)  8.6 - 10.5 mg/dL Final    Total Protein 10/03/2024 7.3  6.0 - 8.5 g/dL Final    Albumin 10/03/2024 4.8  3.5 - 5.2 g/dL Final    ALT (SGPT) 10/03/2024 15  1 - 33 U/L Final    AST (SGOT) 10/03/2024 11  1 - 32 U/L Final    Alkaline Phosphatase 10/03/2024 81  39 - 117 U/L Final    Total Bilirubin 10/03/2024 0.4  0.0 - 1.2 mg/dL Final    Globulin 10/03/2024 2.5  gm/dL Final    A/G Ratio 10/03/2024 1.9  g/dL Final    BUN/Creatinine Ratio 10/03/2024 7.3  7.0 - 25.0 Final    Anion Gap 10/03/2024 11.0  5.0 - 15.0 mmol/L Final    eGFR 10/03/2024 65.2  >60.0 mL/min/1.73 Final    Total Cholesterol 10/03/2024 217 (H)  0 - 200 mg/dL Final    Triglycerides 10/03/2024 99  0 - 150 mg/dL Final    HDL  Cholesterol 10/03/2024 35 (L)  40 - 60 mg/dL Final    LDL Cholesterol  10/03/2024 164 (H)  0 - 100 mg/dL Final    VLDL Cholesterol 10/03/2024 18  5 - 40 mg/dL Final    LDL/HDL Ratio 10/03/2024 4.63   Final    TSH 10/03/2024 0.606  0.270 - 4.200 uIU/mL Final    Free T4 10/03/2024 1.41  0.92 - 1.68 ng/dL Final   Office Visit on 09/16/2024   Component Date Value Ref Range Status    GARDNERELLA VAGINALIS 09/16/2024 Positive (A)  Negative Final    TRICHOMONAS VAGINALIS 09/16/2024 Negative  Negative Final    KADE SPECIES 09/16/2024 Negative  Negative Final   Admission on 08/09/2024, Discharged on 08/09/2024   Component Date Value Ref Range Status    HCG, Urine QL 08/09/2024 Negative  Negative Final    ABO Type 08/09/2024 A   Final    RH type 08/09/2024 Positive   Final    Antibody Screen 08/09/2024 Negative   Final    T&S Expiration Date 08/09/2024 8/16/2024 11:59:00 PM   Final    WBC 08/09/2024 7.10  3.40 - 10.80 10*3/mm3 Final    RBC 08/09/2024 4.91  3.77 - 5.28 10*6/mm3 Final    Hemoglobin 08/09/2024 14.9  12.0 - 15.9 g/dL Final    Hematocrit 08/09/2024 45.0  34.0 - 46.6 % Final    MCV 08/09/2024 91.6  79.0 - 97.0 fL Final    MCH 08/09/2024 30.3  26.6 - 33.0 pg Final    MCHC 08/09/2024 33.1  31.5 - 35.7 g/dL Final    RDW 08/09/2024 13.6  12.3 - 15.4 % Final    RDW-SD 08/09/2024 45.8  37.0 - 54.0 fl Final    MPV 08/09/2024 9.3  6.0 - 12.0 fL Final    Platelets 08/09/2024 212  140 - 450 10*3/mm3 Final    Neutrophil % 08/09/2024 56.5  42.7 - 76.0 % Final    Lymphocyte % 08/09/2024 33.7  19.6 - 45.3 % Final    Monocyte % 08/09/2024 5.9  5.0 - 12.0 % Final    Eosinophil % 08/09/2024 3.0  0.3 - 6.2 % Final    Basophil % 08/09/2024 0.6  0.0 - 1.5 % Final    Immature Grans % 08/09/2024 0.3  0.0 - 0.5 % Final    Neutrophils, Absolute 08/09/2024 4.02  1.70 - 7.00 10*3/mm3 Final    Lymphocytes, Absolute 08/09/2024 2.39  0.70 - 3.10 10*3/mm3 Final    Monocytes, Absolute 08/09/2024 0.42  0.10 - 0.90 10*3/mm3 Final     Eosinophils, Absolute 08/09/2024 0.21  0.00 - 0.40 10*3/mm3 Final    Basophils, Absolute 08/09/2024 0.04  0.00 - 0.20 10*3/mm3 Final    Immature Grans, Absolute 08/09/2024 0.02  0.00 - 0.05 10*3/mm3 Final    nRBC 08/09/2024 0.0  0.0 - 0.2 /100 WBC Final    ABO Type 08/09/2024 A   Final    RH type 08/09/2024 Positive   Final    Case Report 08/09/2024    Final                    Value:Surgical Pathology Report                         Case: [id number]                                  Authorizing Provider:  Afshan Magana DO         Collected:           08/09/2024 12:19 PM          Ordering Location:     [facility] MAIN Received:            08/12/2024 09:46 AM                                 OR                                                                           Pathologist:           Jr Merritt MD                                                            Specimen:    Uterus, Cervix, Bilateral Fallopian Tubes , UTERUS, CERVIX, BILATERAL FALLOPIAN                     TUBES, LEFT OVARY                                                                          Clinical Information 08/09/2024    Final                    Value:Abnormal uterine bleeding (AUB)                          Uterine leiomyoma, unspecified location    Final Diagnosis 08/09/2024    Final                    Value:Uterus, cervix, bilateral fallopian tubes and ovaries, hysterectomy with                           bilateral salpingo-oophorectomy:                           - Cervix:  No significant pathologic change                           - Endometrium:  Secretory endometrium                           - Myometrium:  Multiple leiomyomas                           - Serosa:  No significant pathologic change                           - Left ovary:  Hemorrhagic cyst with dystrophic ossification, see comment                           - Bilateral fallopian tubes:  No significant pathologic change                              Comment  "08/09/2024    Final                    Value:Sections from the left ovary show dense fibrous tissue, blood, and                           hemosiderin laden macrophages, consistent with a hemorrhagic cyst.  No                           epithelial lining or endometrial stroma is present, therefore, the cyst                           cannot be further classified.    Gross Description 08/09/2024    Final                    Value:1. Uterus, Cervix, Bilateral Fallopian Tubes.                          Received fresh and subsequently placed in formalin, labeled \"uterus,                           cervix, bilateral fallopian tubes, left ovary\" is a 637 g, 18.0 x 13.0 x                           7.0 cm aggregate of morcellated hysterectomy fragments including detached,                           bilateral, fimbriated fallopian tubes, 3.0 x 2.5 cm unoriented cervix, and                           a disrupted left ovary.  The cervix displays tan, smooth serosa with a 1.0                           x 0.5 cm cervical os.  The disrupted fibroids ranging in size from 1.5-7.0                           cm and each display a white, whorled cut surface.  An endometrial cavity                           is not grossly appreciated.  The 5.5 cm in length by 0.7 cm average                           diameter fallopian tubes display shaggy serosa and a pinpoint lumen upon                           sectioning.  The 10 g, 3.5 x 2.5 x 2.5 cm disrupted left ovary is                           diffusely calcified, and sectioning reveals a calcified cut surface and                           hemorrhagic material.  Representative sections are submitted as follows:                           1A-anterior/posterior cervix; 1B-possible endometrial cavity;                           1C-2O-rnhohxvleb fallopian tubes, fimbria entirely; 4D-1G-twembexd of                           fibroids; 1G-calcified ovary, following decalcification.  1H-additional                "            sections of left ovary, following decalcification.  8/15/2024                           ASIF    Microscopic Description 08/09/2024    Final                    Value:Microscopic examination performed.   Lab on 05/21/2024   Component Date Value Ref Range Status    Ferritin 05/21/2024 49.40  13.00 - 150.00 ng/mL Final    Iron 05/21/2024 68  37 - 145 mcg/dL Final   Office Visit on 04/08/2024   Component Date Value Ref Range Status    Glucose 04/08/2024 91  65 - 99 mg/dL Final    BUN 04/08/2024 11  6 - 20 mg/dL Final    Creatinine 04/08/2024 0.80  0.57 - 1.00 mg/dL Final    Sodium 04/08/2024 142  136 - 145 mmol/L Final    Potassium 04/08/2024 4.5  3.5 - 5.2 mmol/L Final    Chloride 04/08/2024 106  98 - 107 mmol/L Final    CO2 04/08/2024 28.0  22.0 - 29.0 mmol/L Final    Calcium 04/08/2024 9.8  8.6 - 10.5 mg/dL Final    Total Protein 04/08/2024 6.9  6.0 - 8.5 g/dL Final    Albumin 04/08/2024 4.7  3.5 - 5.2 g/dL Final    ALT (SGPT) 04/08/2024 19  1 - 33 U/L Final    AST (SGOT) 04/08/2024 14  1 - 32 U/L Final    Alkaline Phosphatase 04/08/2024 81  39 - 117 U/L Final    Total Bilirubin 04/08/2024 0.3  0.0 - 1.2 mg/dL Final    Globulin 04/08/2024 2.2  gm/dL Final    A/G Ratio 04/08/2024 2.1  g/dL Final    BUN/Creatinine Ratio 04/08/2024 13.8  7.0 - 25.0 Final    Anion Gap 04/08/2024 8.0  5.0 - 15.0 mmol/L Final    eGFR 04/08/2024 95.1  >60.0 mL/min/1.73 Final    Total Cholesterol 04/08/2024 192  0 - 200 mg/dL Final    Triglycerides 04/08/2024 80  0 - 150 mg/dL Final    HDL Cholesterol 04/08/2024 41  40 - 60 mg/dL Final    LDL Cholesterol  04/08/2024 136 (H)  0 - 100 mg/dL Final    VLDL Cholesterol 04/08/2024 15  5 - 40 mg/dL Final    LDL/HDL Ratio 04/08/2024 3.29   Final    Hepatitis C Ab 04/08/2024 Non-Reactive  Non-Reactive Final    TSH 04/08/2024 2.430  0.270 - 4.200 uIU/mL Final    Free T4 04/08/2024 1.11  0.93 - 1.70 ng/dL Final    WBC 04/08/2024 6.43  3.40 - 10.80 10*3/mm3 Final    RBC 04/08/2024 4.77  3.77 -  5.28 10*6/mm3 Final    Hemoglobin 04/08/2024 14.3  12.0 - 15.9 g/dL Final    Hematocrit 04/08/2024 42.6  34.0 - 46.6 % Final    MCV 04/08/2024 89.3  79.0 - 97.0 fL Final    MCH 04/08/2024 30.0  26.6 - 33.0 pg Final    MCHC 04/08/2024 33.6  31.5 - 35.7 g/dL Final    RDW 04/08/2024 12.7  12.3 - 15.4 % Final    RDW-SD 04/08/2024 41.5  37.0 - 54.0 fl Final    MPV 04/08/2024 10.0  6.0 - 12.0 fL Final    Platelets 04/08/2024 205  140 - 450 10*3/mm3 Final    Neutrophil % 04/08/2024 53.5  42.7 - 76.0 % Final    Lymphocyte % 04/08/2024 32.8  19.6 - 45.3 % Final    Monocyte % 04/08/2024 7.8  5.0 - 12.0 % Final    Eosinophil % 04/08/2024 5.1  0.3 - 6.2 % Final    Basophil % 04/08/2024 0.6  0.0 - 1.5 % Final    Immature Grans % 04/08/2024 0.2  0.0 - 0.5 % Final    Neutrophils, Absolute 04/08/2024 3.44  1.70 - 7.00 10*3/mm3 Final    Lymphocytes, Absolute 04/08/2024 2.11  0.70 - 3.10 10*3/mm3 Final    Monocytes, Absolute 04/08/2024 0.50  0.10 - 0.90 10*3/mm3 Final    Eosinophils, Absolute 04/08/2024 0.33  0.00 - 0.40 10*3/mm3 Final    Basophils, Absolute 04/08/2024 0.04  0.00 - 0.20 10*3/mm3 Final    Immature Grans, Absolute 04/08/2024 0.01  0.00 - 0.05 10*3/mm3 Final    nRBC 04/08/2024 0.0  0.0 - 0.2 /100 WBC Final   There may be more visits with results that are not included.       EKG Results:  No orders to display       Imaging Results:  CT Head Without Contrast    Result Date: 1/16/2025  Impression: 1.No acute intracranial process identified. 2.Mild mucosal disease within left sphenoid sinus. Electronically Signed: Travis Horan MD  1/16/2025 7:00 PM EST  Workstation ID: [id number]    XR Chest PA & Lateral (In Office)    Result Date: 1/7/2025  Impression: No acute process. Electronically Signed: Rito Lambert MD  1/7/2025 3:10 PM EST  Workstation ID: [id number]        Assessment & Plan   Diagnoses and all orders for this visit:    1. Severe episode of recurrent major depressive disorder, without psychotic features (Primary)  -      citalopram (CeleXA) 20 MG tablet; Take 1 tablet by mouth Every Morning.  Dispense: 30 tablet; Refill: 0  -     QUEtiapine (SEROquel) 100 MG tablet; Take 1 to 2 tab PO QHS PRN sleep  Dispense: 60 tablet; Refill: 0  -     Ambulatory Referral to Psychotherapy    2. Generalized anxiety disorder  -     citalopram (CeleXA) 20 MG tablet; Take 1 tablet by mouth Every Morning.  Dispense: 30 tablet; Refill: 0  -     QUEtiapine (SEROquel) 100 MG tablet; Take 1 to 2 tab PO QHS PRN sleep  Dispense: 60 tablet; Refill: 0  -     Ambulatory Referral to Psychotherapy    3. Insomnia, unspecified type  -     QUEtiapine (SEROquel) 100 MG tablet; Take 1 to 2 tab PO QHS PRN sleep  Dispense: 60 tablet; Refill: 0    4. Cigarette nicotine dependence without complication      Patient screened positive for depression based on a PHQ-9 score of 9 on 3/6/2025. Follow-up recommendations include: Prescribed antidepressant medication treatment, Referral to Mental Health specialist, Suicide Risk Assessment performed, and see assessment below .    Presentation seems most consistent with MDD, HELENE, insomnia, nicotine dependence.  We will increase Celexa for management depression, anxiety, and overall mood.  We will discontinue trazodone and start on Seroquel for depression, anxiety, insomnia, and overall mood.  Counseled on smoking cessation, will reassess at next office visit.  We will refer for psychotherapy.  Instructed patient to contact the office for any new or worsening symptoms or any other concerns.  Follow-up in 1 to 2 weeks.  Addressed all questions and concerns.      Visit Diagnoses:    ICD-10-CM ICD-9-CM   1. Severe episode of recurrent major depressive disorder, without psychotic features  F33.2 296.33   2. Generalized anxiety disorder  F41.1 300.02   3. Insomnia, unspecified type  G47.00 780.52   4. Cigarette nicotine dependence without complication  F17.210 305.1       PLAN:  Safety: Discussed concerns about access to firearms and  the need to have a trusted friend or family member hold onto her firearm until mood improves.  Patient states she will give her gun to her father.  Therapy: We will refer for psychotherapy  Risk Assessment: Risk of self-harm acutely is moderate to severe.  Risk factors include anxiety disorder, mood disorder, access to firearms, intermittent SI (fleeting and passive, no present SI), and recent psychosocial stressors (pandemic). Protective factors include no family history, no history of suicide attempts or self-harm in the past, minimal AODA, healthcare seeking, future orientation, willingness to engage in care.  Risk of self-harm chronically is also moderate to severe, but could be further elevated in the event of treatment noncompliance and/or AODA.  Labs/Diagnostics Ordered:   Orders Placed This Encounter   Procedures    Ambulatory Referral to Psychotherapy     Medications:   New Medications Ordered This Visit   Medications    citalopram (CeleXA) 20 MG tablet     Sig: Take 1 tablet by mouth Every Morning.     Dispense:  30 tablet     Refill:  0    QUEtiapine (SEROquel) 100 MG tablet     Sig: Take 1 to 2 tab PO QHS PRN sleep     Dispense:  60 tablet     Refill:  0       Discussed all risks, benefits, alternatives, and side effects of citalopram including but not limited to GI upset, sexual dysfunction, bleeding risk, seizure risk, insomnia, sedation, headache, activation of darnell or hypomania, increased fragility fracture risk, hyponatremia, ocular effects, dose-dependent prolonged QT interval, withdrawal syndrome following abrupt discontinuation, serotonin syndrome, and activation of suicidal ideation and behavior.  Pt educated on the need to practice safe sex while taking this med. Discussed the need for pt to immediately call the office for any new or worsening symptoms, such as worsening depression; feeling nervous or restless; suicidal thoughts or actions; or other changes changes in mood or behavior, and all  other concerns. Pt educated on med compliance and the risks of suddenly stopping this medication or missing doses. Pt verbalized understanding and is agreeable to taking citalopram. Addressed all questions and concerns.     Discussed all risks, benefits, alternatives, and side effects of Quetiapine, including but not limited to GI upset, agitation, dizziness, headache, sexual dysfunction, sedation, weight gain, anticholinergic effects, cataract risk, dyslipidemia, extrapyramidal symptoms (dystonia, drug-induced parkinsonism, akathisia, tardive dyskinesia), lowering of seizure threshold, hematologic abnormalities, hyperglycemia, hypothyroidism, increased mortality in elderly patients with dementia-related psychosis, neuroleptic malignant syndrome, orthostatic hypotension, falls risk in older adults, prolonged QT interval, and temperature dysregulation. Pt instructed to avoid driving and doing other tasks or actions that require to be alert until knowing how the drug affects them.  Pt educated on the need to practice safe sex while taking this med. Discussed the need for pt to immediately call the office for any new or worsening symptoms, such as worsening depression; feeling nervous or restless; suicidal thoughts or actions; or other changes changes in mood or behavior, and all other concerns. Pt educated on med compliance and the risks of suddenly stopping this medication or missing doses. Pt verbalized understanding and is agreeable to taking Quetiapine. Addressed all questions and concerns.       Follow up: F/u in 1-2 weeks.      TREATMENT PLAN/GOALS: Continue supportive psychotherapy efforts and medications as indicated. Treatment and medication options discussed during today's visit. Patient ackowledged and verbally consented to continue with current treatment plan and was educated on the importance of compliance with treatment and follow-up appointments.    MEDICATION ISSUES:  GAUTAM reviewed as  expected.  Discussed medication options and treatment plan of prescribed medication as well as the risks, benefits, and side effects including potential falls, possible impaired driving and metabolic adversities among others. Patient is agreeable to call the office with any worsening of symptoms or onset of side effects. Patient is agreeable to call 911 or go to the nearest ER should he/she begin having SI/HI. No medication side effects or related complaints today.            This document has been electronically signed by Martha Harvey PA-C  March 6, 2025 16:02 EST      Part of this note may be an electronic transcription/translation of spoken language to printed text using the Dragon Dictation System.

## 2025-03-06 NOTE — PATIENT INSTRUCTIONS
Suicidal Feelings: How to Help Yourself  Suicide is when you end your own life. Suicidal ideation includes expressing thoughts about, or a preoccupation with, ending your own life. There are many things you can do to help yourself feel better when struggling with these feelings. Many services and people are available to support you and others who struggle with similar feelings.  If you ever feel like you may hurt yourself or others, or have thoughts about taking your own life, get help right away. To get help:  Go to your nearest emergency room.  Call 911.  Call the [facility] and human services helpline (211 in the [place]  Call or text a suicide hotline to speak with a trained counselor. The following suicide hotlines are available in the [place]:  The Suicide & Crisis Lifeline (free and confidential):  Call 1-602.877.2008 or 988.  Text 284504.  4-829-SWJTJED (1-213.339.5399).  If you're a [place]:  Call 988 and press 1.  Text the Veterans Crisis Line at 615624.  1-533.255.4038. This is a hotline for [place] speakers.  1-594.595.7709. This is a hotline for TTY users.  8-179-2-U-DEBI (1-907.496.4676). This is a hotline for lesbian, ro, bisexual, transgender, or questioning youth.  For a list of hotlines in [place], visit suicide.org/hotlines/international/rftowu-zbhzczc-fduoagbb.html  Contact a crisis center or a local suicide prevention center. To find a crisis center or suicide prevention center:  Call your local hospital, clinic, community service organization, mental health center, social service provider, or health department. Ask for help with connecting to a crisis center.  For a list of crisis centers in the [place], visit: suicidepreventionlifeline.org  For a list of crisis centers in [place], visit: suicideprevention.ca  How to help yourself feel better    Promise yourself that you will not do anything bad or extreme when you have suicidal feelings. Remember the times you have felt  hopeful.  Many people have gotten through suicidal thoughts and feelings, and you can too.  If you have had these feelings before, remind yourself that you can get through them again.  Let family, friends, teachers, or counselors know how you are feeling. Do not separate yourself from those who care about you and want to help you.  Talk with someone every day, even if you do not feel like talking to anyone or being with other people.  Face-to-face conversation is best to help them understand your feelings.  Contact a mental health care provider and work with this person regularly.  Make a safety plan that you can follow during a crisis.  Include phone numbers of suicide prevention hotlines, mental health professionals, and trusted friends and family members you can call during an emergency.  Save these numbers on your phone.  If you are thinking of taking a lot of medicine, give your medicine to someone who can give it to you as prescribed.  If you are on antidepressants and are concerned you will overdose, tell your health care provider so that he or she can give you safer medicines.  Try to stick to your routines and follow a schedule every day. Make self-care a priority.  Make a list of realistic goals, and cross them off when you achieve them. Accomplishments can give you a sense of worth.  Wait until you are feeling better before doing things that you find difficult or unpleasant.  Do things that you have always enjoyed to take your mind off your feelings.  Try reading a book, or listening to or playing music.  Spending time outside, in nature, may help you feel better.  Follow these instructions at home:    Visit your primary health care provider every year for a physical and a mental health checkup.  Take over-the-counter and prescription medicines only as told by your health care provider.  Ask your health care provider about the possible side effects of any medicines you are taking.  Ask your health care  provider about whether suicidal ideation is a possible side effect of any of your medicines.  Learn about suicidal ideation and what increases the risk for the development of suicidal thoughts.  Eat a well-balanced diet, and eat regular meals.  Get plenty of rest.  Exercise if you are able. Just 30 minutes of exercise each day can help you feel better.  Keep your living space well lit.  Do not use alcohol or drugs. Remove these substances from your home.  General recommendations  Remove weapons, poisons, knives, and other deadly items from your home.  Work with a mental health care provider as needed.  When you are feeling well, write yourself a letter with tips and support that you can read when you are not feeling well.  Remember that life's difficulties can be sorted out with help. Conditions can be treated, and you can learn behaviors and ways of thinking that will help you.  Work with your health care provider or counselor to learn ways of coping with your thoughts and feelings.  Where to find more information  National Suicide Prevention Lifeline: www.suicidepreventionlifeline.org  Hopeline: www.hopeline.Nulogy  American Foundation for Suicide Prevention: www.afsp.org  The [place] Project (for lesbian, ro, bisexual, transgender, or questioning youth): www.thetrevorproject.org  National [place] of Mental Health: www.nimh.nih.gov/health/topics/suicide-prevention  Suicide Prevention Resources: afsp.org/suicide-prevention-resources  Contact a health care provider if:  You feel as though you are a burden to others.  You feel agitated, angry, vengeful, or have extreme mood swings.  You have withdrawn from family and friends.  You are frequently using drugs or alcohol.  Get help right away if:  You are talking about suicide or wishing to die.  You start making plans for how to commit suicide.  You feel that you have no reason to live.  You start making plans for putting your affairs in order, saying goodbye, or giving  your possessions away.  You feel guilt, shame, or unbearable pain, and it seems like there is no way out.  You are engaging in risky behaviors that could lead to death.  If you have any of these thoughts or symptoms, get help right away:  Go to your nearest emergency department or crisis center.  Call emergency services (911 in the [place]  Call or text a suicide crisis helpline.  Summary  Suicide is when you take your own life. Suicidal feelings are thoughts about ending your own life.  Promise yourself that you will not do anything bad or extreme when you have suicidal feelings.  Let family, friends, teachers, or counselors know how you are feeling.  Get help right away if you start making plans for how to commit suicide.  This information is not intended to replace advice given to you by your health care provider. Make sure you discuss any questions you have with your health care provider.  Document Revised: 10/08/2024 Document Reviewed: 04/28/2022  [employer] Patient Education © 2024 [employer]  Persistent Depressive Disorder, Adult  Persistent depressive disorder (PDD) is a mental health condition that causes symptoms of low-level depression for 2 years or longer. This disorder may also be called long-term (chronic) depression or dysthymia. PDD may include episodes of major depressive disorder (MDD), which is more severe depression that lasts for about 2 weeks or longer.  PDD can affect the way you think, feel, and behave. This condition may also affect your relationships. You may be more likely to get sick if you have PDD.  What are the causes?  The exact cause of this condition is not known. PDD is most likely caused by a combination of risk factors.  What increases the risk?  The following factors may make someone more likely to develop PDD:  A family history of depression.  Being female.  Abnormally low levels of certain brain chemicals.  Traumatic or painful events in childhood, especially abuse or the  loss of a parent.  Chronic stress caused by upsetting life experiences, troubled family relationships, or losses.  Chronic physical illness or other mental health disorders.  Cultural stress, such as stress from poor living conditions or discrimination.  What are the signs or symptoms?  Symptoms of this condition may occur for most of the day, and may include:  Physical symptoms, such as:  Tiredness or low energy.  Eating or sleeping too much or too little.  Being restless or agitated.  Unexplained physical complaints.  Mental and emotional symptoms, such as:  Feeling hopeless, worthless, or guilty.  Anxiety.  Trouble focusing or making decisions.  Low self-esteem.  Negative outlook.  Feeling irritable.  Being unable to have fun or feel pleasure.  Behavioral symptoms, such as:  Withdrawing from others.  Aggressive behavior or anger.  How is this diagnosed?  This condition may be diagnosed based on:  Your symptoms.  Your medical history, including your mental health history. This may involve tests to evaluate your level of depression. You may be asked questions about your lifestyle, including any drug and alcohol use, and how long you have had symptoms of PDD.  A physical exam.  Blood tests to rule out other conditions.  PDD may be diagnosed if you have had the following symptoms:  A depressed mood or loss of interest in things for 2 years or longer.  Other symptoms of depression.  How is this treated?  This condition is usually treated by mental health professionals, such as psychologists, psychiatrists, psychiatric nurse practitioners, and clinical social workers. You may need more than one type of treatment. Treatment may include:  Psychotherapy, also called talk therapy or counseling. Types of psychotherapy include:  Cognitive behavioral therapy (CBT). This teaches you to recognize unhealthy feelings, thoughts, and behaviors, and to replace them with positive thoughts and actions.  Interpersonal therapy (IPT).  This helps you improve the way you relate to and communicate with others.  Family therapy. This treatment involves members of your family.  Medicines to treat anxiety and depression or to help balance chemicals in your brain that affect emotions and behaviors.  Lifestyle changes, such as:  Limiting alcohol and drug use.  Getting regular exercise.  Developing a consistent sleep routine.  Making healthy eating choices.  A combination of psychotherapy and medicines is thought to be the most effective form of treatment.  Follow these instructions at home:  Activity    Exercise regularly and spend time outdoors.  Find activities that you enjoy doing, and make time to do them.  Find healthy ways to manage stress, such as:  Meditation or deep breathing.  Spending time in nature.  Journaling.  Return to your normal activities as told by your health care provider.  Alcohol and drug use  If you drink alcohol:  Limit how much you have to:  0-1 drink a day for women who are not pregnant.  0-2 drinks a day for men.  Know how much alcohol is in a drink. In the [place] one drink equals one 12 oz bottle of beer (355 mL), one 5 oz glass of wine (148 mL), or one 1½ oz glass of hard liquor (44 mL).  Discuss your alcohol or drug use with your health care provider. Alcohol and drugs can affect any antidepressant medicines you are taking.  General instructions    Take over-the-counter medicines, prescription medicines, and herbal preparations only as told by your health care provider.  Eat a healthy diet and get plenty of sleep.  Consider joining a support group. Your health care provider may be able to recommend one.  Keep all follow-up visits. This is important.  Where to find more information  National [place] on Mental Illness: www.waqar.org  National [place] of Mental Health: www.nimh.nih.gov  American Psychiatric Association: www.psychiatry.org/patients-families  Contact a health care provider if:  Your symptoms get worse.  You  develop new symptoms.  Get help right away if:  You harm yourself.  You have serious thoughts about hurting yourself or others.  You see, hear, taste, smell, or feel things that are not real (hallucinate).  If you ever feel like you may hurt yourself or others, or have thoughts about taking your own life, get help right away. Go to your nearest emergency department or:  Call your local emergency services (731 in the [place]  Call a suicide crisis helpline, such as the National Suicide Prevention Lifeline at 1-474.649.2746 or 565 in the [place] This is open 24 hours a day in the [place]  If you’re a :  Call 988 and press 1. This is open 24 hours a day.  Text the Veterans Crisis Line at 618811.  Summary  Persistent depressive disorder (PDD) is a mental health condition that causes symptoms of low-level depression for 2 years or longer.  This condition is usually treated by mental health professionals. You may need more than one type of treatment. Treatment may involve psychotherapy, medicines, and lifestyle changes.  Get help right away if you have serious thoughts about hurting yourself or others.  This information is not intended to replace advice given to you by your health care provider. Make sure you discuss any questions you have with your health care provider.  Document Revised: 08/01/2024 Document Reviewed: 04/07/2022  [employer] Patient Education © 2024 [employer]  Major Depressive Disorder, Adult  Major depressive disorder (MDD) is a mental health condition. It may also be called clinical depression or unipolar depression. MDD causes symptoms of sadness, hopelessness, and loss of interest in things. These symptoms last most of the day, almost every day, for 2 weeks. MDD can also cause physical symptoms. It can interfere with relationships and activities, such as work, school, and activities that are usually pleasant.  MDD may be mild, moderate, or severe. It may be single-episode MDD, which happens once, or  recurrent MDD, which may occur many times.  What are the causes?  The exact cause of this condition is not known.  What increases the risk?  The following factors may make someone more likely to develop MDD:  A family history of depression.  Being female.  Long-term (chronic) stress, physical illness, other mental health disorders, or substance misuse.  Trauma, including:  Family problems.  Violence or abuse.  Loss of a parent or close family member.  Experiencing discrimination.  What are the signs or symptoms?  The main symptoms of MDD usually include:  Constant depressed or irritable mood.  A loss of interest in activities.  Sleeping or eating too much or too little.  Tiredness or low energy.  Other symptoms include:  Unexplained weight gain or weight loss.  Being agitated, restless, or weak.  Feeling hopeless, worthless, or guilty.  Trouble thinking clearly or making decisions.  Thoughts of suicide or harming others.  Spending a lot of time alone.  Not being able to complete daily tasks or work.  Severe symptoms of this condition may include:  Psychotic depression.This may include false beliefs or delusions. It may also include seeing, hearing, tasting, smelling, or feeling things that are not real (hallucinations).  Chronic depression or persistent depressive disorder. This is low-level depression that lasts for at least 2 years.  Melancholic depression, or feeling extremely sad and hopeless.  Catatonic depression, which includes trouble speaking and trouble moving.  Seasonal depression, which is caused by changes in the seasons.  How is this diagnosed?  This condition may be diagnosed based on:  Your symptoms.  Your medical and mental health history.  A physical exam.  Blood tests to rule out other conditions.  MDD is confirmed if you have either a depressed mood or loss of interest and at least four other MDD symptoms, most of the day, nearly every day, in a 2-week period.  How is this treated?  This  condition is usually treated by mental health professionals, such as psychologists, psychiatrists, and clinical social workers. You may need more than one type of treatment. Treatment may include:  Psychotherapy, also called talk therapy or counseling. Types of psychotherapy include:  Cognitive behavioral therapy (CBT). This teaches you to recognize unhealthy feelings, thoughts, and behaviors, and replace them with positive thoughts and actions.  Interpersonal therapy (IPT). This helps you to improve the way you communicate with others or relate to them.  Family therapy. This treatment includes members of your family.  Medicines to treat anxiety and depression. These medicines help to balance the brain chemicals that affect your emotions.  Lifestyle changes. You may be asked to:  Limit alcohol use and avoid drug use.  Get regular exercise.  Get plenty of sleep.  Make healthy eating choices.  Spend more time outdoors.  Brain stimulation. This may be done if symptoms are very severe and other treatments have not worked. Examples of this treatment are electroconvulsive therapy and transcranial magnetic stimulation.  Follow these instructions at home:  Alcohol use  Do not drink alcohol if:  Your health care provider tells you not to drink.  You are pregnant, may be pregnant, or are planning to become pregnant.  If you drink alcohol:  Limit how much you have to:  0-1 drink a day for women  0-2 drinks a day for men.  Know how much alcohol is in your drink. In the [place] one drink equals one 12 oz bottle of beer (355 mL), one 5 oz glass of wine (148 mL), or one 1½ oz glass of hard liquor (44 mL).  Activity  Exercise regularly and spend time outdoors.  Find activities that you enjoy and make time to do them.  Find healthy ways to manage stress, such as:  Meditation or deep breathing.  Spending time in nature.  Journaling.  Return to your normal activities as told by your health care provider. Ask your health care provider  what activities are safe for you.  General instructions    Take over-the-counter and prescription medicines only as told by your health care provider.  Discuss alcohol use with your health care provider. Alcohol can affect any antidepressant medicines you are taking.  Discuss any drug use with your health care provider.  Eat a healthy diet and get enough sleep.  Consider joining a support group. Your health care provider may be able to recommend one.  Keep all follow-up visits. It is important for your health care provider to check on your mood, behavior, and medicines. Your health care provider will make changes to your treatment as needed.  Where to find more information  National [place] on Mental Illness: waqar.org  National [place] of Mental Health: nimh.nih.gov  American Psychiatric Association: psychiatry.org  Contact a health care provider if:  Your symptoms get worse.  You develop new symptoms.  Get help right away if:  You hurt yourself on purpose (self-harm).  You have thoughts about hurting yourself or others.  You have hallucinations.  Get help right away if you feel like you may hurt yourself or others, or have thoughts about taking your own life. Go to your nearest emergency room or:  Call 911.  Call the National Suicide Prevention Lifeline at 1-131.682.5750 or 681. This is open 24 hours a day.  Text the Crisis Text Line at 918476.  This information is not intended to replace advice given to you by your health care provider. Make sure you discuss any questions you have with your health care provider.  Document Revised: 04/25/2023 Document Reviewed: 04/25/2023  [employer] Patient Education © 2024 [employer]

## 2025-03-14 ENCOUNTER — TELEPHONE (OUTPATIENT)
Dept: PSYCHIATRY | Facility: CLINIC | Age: 45
End: 2025-03-14
Payer: COMMERCIAL

## 2025-03-17 ENCOUNTER — OFFICE VISIT (OUTPATIENT)
Dept: BEHAVIORAL HEALTH | Facility: CLINIC | Age: 45
End: 2025-03-17
Payer: COMMERCIAL

## 2025-03-17 VITALS
HEART RATE: 56 BPM | HEIGHT: 63 IN | WEIGHT: 138.6 LBS | BODY MASS INDEX: 24.56 KG/M2 | SYSTOLIC BLOOD PRESSURE: 124 MMHG | DIASTOLIC BLOOD PRESSURE: 78 MMHG

## 2025-03-17 DIAGNOSIS — F41.1 GENERALIZED ANXIETY DISORDER: ICD-10-CM

## 2025-03-17 DIAGNOSIS — F10.20 UNCOMPLICATED ALCOHOL DEPENDENCE: ICD-10-CM

## 2025-03-17 DIAGNOSIS — F33.1 MODERATE EPISODE OF RECURRENT MAJOR DEPRESSIVE DISORDER: Primary | ICD-10-CM

## 2025-03-17 DIAGNOSIS — G47.00 INSOMNIA, UNSPECIFIED TYPE: ICD-10-CM

## 2025-03-17 PROCEDURE — 99214 OFFICE O/P EST MOD 30 MIN: CPT | Performed by: PHYSICIAN ASSISTANT

## 2025-03-17 PROCEDURE — 3078F DIAST BP <80 MM HG: CPT | Performed by: PHYSICIAN ASSISTANT

## 2025-03-17 PROCEDURE — 1160F RVW MEDS BY RX/DR IN RCRD: CPT | Performed by: PHYSICIAN ASSISTANT

## 2025-03-17 PROCEDURE — 3074F SYST BP LT 130 MM HG: CPT | Performed by: PHYSICIAN ASSISTANT

## 2025-03-17 PROCEDURE — 1159F MED LIST DOCD IN RCRD: CPT | Performed by: PHYSICIAN ASSISTANT

## 2025-03-17 RX ORDER — NALTREXONE HYDROCHLORIDE 50 MG/1
TABLET, FILM COATED ORAL
Qty: 30 TABLET | Refills: 0 | Status: SHIPPED | OUTPATIENT
Start: 2025-03-17

## 2025-03-17 RX ORDER — FLUTICASONE PROPIONATE 50 MCG
2 SPRAY, SUSPENSION (ML) NASAL DAILY
Qty: 11.1 G | Refills: 1 | Status: SHIPPED | OUTPATIENT
Start: 2025-03-17

## 2025-03-17 NOTE — PROGRESS NOTES
Chief Complaint:  Depression     History of Present Illness: Frances Rao is a 44 y.o. female who presents to the office today for f/u of mood. Pt has been taking higher dose of Celexa, but pharmacy has not filled Seroquel yet.  Patient states Seroquel should be ready at her pharmacy on 3/21/2025.  Pt c/o depression that is no longer constant, comes and goes, was fine until recent argument with her father. Pt denies feeling depressed at this time. No anhedonia, pt enjoys cooking and watching TV. No hopelessness. Pt denies having any current SI or HI at this time. No SI since last visit. Pt c/o difficulty sleeping. Pt c/o anxiety that consists of worrying about everything, that comes and goes, occurs daily, rates it a 2-9/10. Pt recently started a job and expects anxiety to improve. She also c/o irritability, no change. Pt has had some alcohol cravings. Pt reports EtOH use last week Monday through Friday, with drinking a galloon of whiskey over 4 days.     I have reviewed/confirmed previously documented HPI with no changes.     Answers submitted by the patient for this visit:  Depression (Submitted on 3/10/2025)  Chief Complaint: Depression  Visit: follow-up  Frequency: most days  Severity: mild  excessive worry: No  insomnia: Yes  irritability: Yes  malaise/fatigue: Yes  obsessions: No  hypersomnia: No  difficulty controlling mood: No  difficulty staying asleep: Yes  difficulty falling asleep: Yes  Hours of sleep per night: 5 Hours  Medication compliance: %      Medical Record Review: Reviewed office visit note from 11/18/2024, history of orthostatic hypotension, has increased water to 1 bottle per day.  Patient complains of insomnia and does not think trazodone is working.  Patient has anxiety she has increased since restarting work.  She midst of feeling down and sad.  Patient is going through relationship issues.  Patient denies SI and HI.  History of RLS, Anxiety, depression, migraine      PHQ-9  Depression Screening  Little interest or pleasure in doing things?     Feeling down, depressed, or hopeless?     PHQ-2 Total Score     Trouble falling or staying asleep, or sleeping too much?     Feeling tired or having little energy?     Poor appetite or overeating?     Feeling bad about yourself - or that you are a failure or have let yourself or your family down?     Trouble concentrating on things, such as reading the newspaper or watching television?     Moving or speaking so slowly that other people could have noticed? Or the opposite - being so fidgety or restless that you have been moving around a lot more than usual?       Thoughts that you would be better off dead, or of hurting yourself in some way?     PHQ-9 Total Score     If you checked off any problems, how difficult have these problems made it for you to do your work, take care of things at home, or get along with other people?                 HELENE-7           ROS:  Review of Systems   Constitutional:  Positive for fatigue. Negative for appetite change and unexpected weight change.   HENT:  Positive for tinnitus. Negative for drooling.    Eyes:  Negative for visual disturbance.   Respiratory:  Positive for chest tightness and shortness of breath. Negative for cough.    Cardiovascular:  Positive for chest pain and palpitations.   Gastrointestinal:  Positive for abdominal pain, diarrhea and nausea. Negative for constipation and vomiting.   Endocrine: Positive for heat intolerance. Negative for cold intolerance.   Genitourinary:  Negative for difficulty urinating.   Musculoskeletal:  Positive for arthralgias, back pain and myalgias.   Skin:  Negative for rash.   Neurological:  Positive for dizziness and headaches. Negative for syncope.   Psychiatric/Behavioral:  Positive for agitation, dysphoric mood and sleep disturbance. Negative for confusion, self-injury and suicidal ideas. The patient is nervous/anxious.        Problem List:  Patient Active Problem  List   Diagnosis    Cigarette nicotine dependence without complication    Thickened endometrium    Hot flashes    Restless legs syndrome (RLS)    Insomnia due to medical condition    Abnormal uterine bleeding (AUB)    Uterine leiomyoma    Orthostatic hypertension    Syncope    Rectal bleed    Generalized anxiety disorder    Lightheadedness       Current Medications:   Current Outpatient Medications   Medication Sig Dispense Refill    Airsupra 90-80 MCG/ACT aerosol       albuterol sulfate  (90 Base) MCG/ACT inhaler Inhale 2 puffs Every 4 (Four) Hours As Needed for Wheezing. 8 g 2    cetirizine (zyrTEC) 10 MG tablet Take 1 tablet by mouth Daily. 30 tablet 1    citalopram (CeleXA) 20 MG tablet Take 1 tablet by mouth Every Morning. 30 tablet 0    cloNIDine (CATAPRES) 0.1 MG tablet Take 1 tablet by mouth Every Night. 90 tablet 1    fluticasone (FLONASE) 50 MCG/ACT nasal spray Administer 2 sprays into the nostril(s) as directed by provider Daily. 11.1 g 1    hydrOXYzine (ATARAX) 25 MG tablet       meclizine (ANTIVERT) 12.5 MG tablet Take 1 tablet by mouth 3 (Three) Times a Day As Needed for Dizziness. 30 tablet 0    pramipexole (MIRAPEX) 0.25 MG tablet       QUEtiapine (SEROquel) 100 MG tablet Take 1 to 2 tab PO QHS PRN sleep 60 tablet 0    traZODone (DESYREL) 100 MG tablet Take 1 tablet by mouth Every Night. 90 tablet 1    naltrexone (DEPADE) 50 MG tablet Take 0.5 tab PO QD x 4 days, if tolerated can increase to 1 tab PO QD 30 tablet 0     No current facility-administered medications for this visit.       Discontinued Medications:  There are no discontinued medications.      Allergy:   No Known Allergies     Past Medical History:  Past Medical History:   Diagnosis Date    Abnormal Pap smear of cervix     Abnormal uterine bleeding (AUB)     Anxiety     Depression 2012    Fibroid     History of sleep walking     Insomnia     Migraine     Orthostatic hypertension 10/21/2024    Ovarian cyst        Past Surgical  History:  Past Surgical History:   Procedure Laterality Date    COLPOSCOPY      D & C HYSTEROSCOPY N/A 02/01/2024    Procedure: DILATATION AND CURETTAGE HYSTEROSCOPY;  Surgeon: Afshan Magana DO;  Location: [facility] MAIN OR;  Service: Gynecology;  Laterality: N/A;    HYSTERECTOMY      TOTAL LAPAROSCOPIC HYSTERECTOMY N/A 08/09/2024    Procedure: ROBOTIC ASSISTED LAPAROSCOPIC HYSTERECTOMY WITH BILATERAL SALPINGECTOMY, CYTSOCOPY;  Surgeon: Afshan Magana DO;  Location: [facility] MAIN OR;  Service: Robotics - DaVinci;  Laterality: N/A;    WISDOM TOOTH EXTRACTION         Past Psychiatric History:  Began Treatment: 2 years ago   Diagnoses: Anxiety  Psychiatrist: Denies  Therapist: Denies  Admission History: Denies  Medications/Treatment: Celexa, Clonidine, Trazodone, Hydroxyzine, effexor  Self Harm: Denies  Suicide Attempts: Denies  Postpartum depression: Denies    Family Psychiatric History:   Diagnoses: Her half brother has a h/o bipolar.   Substance use: Her pat grandfather and mat grandfather had a h/o EtOH abuse.   Suicide Attempts/Completions: Denies    Family History   Problem Relation Age of Onset    COPD Mother     Restless legs syndrome Mother     Anxiety disorder Mother     Arthritis Mother     COPD Father     Insomnia Father     Arthritis Father     Breast cancer Paternal Great-Grandmother     Breast cancer Maternal Great-Grandmother     Breast cancer Maternal Grandmother     Breast cancer Paternal Grandmother     Uterine cancer Neg Hx     Ovarian cancer Neg Hx     Colon cancer Neg Hx     Melanoma Neg Hx     Prostate cancer Neg Hx        Substance Abuse History:   Alcohol use: Pt will a few shots of whiskey, has been nightly for the past few weeks.   Nicotine: Pt smokes 0.5PPD.   Illicit Drug Use: Pt smokes marijuana.   Longest Period Sober: 2 years from EtOH.   Rehab/AA/NA: Denies    Social History:  Living Situation: Pt lives with her father.   Marital/Relationship History: Single  Children: Denies  Work  "History/Occupation: Pt works as [employer].   Education: Pt completed high school, no college.    History: Denies  Legal: Denies    Social History     Socioeconomic History    Marital status: Single   Tobacco Use    Smoking status: Every Day     Current packs/day: 0.50     Average packs/day: 0.5 packs/day for 50.6 years (25.3 ttl pk-yrs)     Types: Cigarettes     Start date: 8/12/1996    Smokeless tobacco: Never    Tobacco comments:     Last 1/31/24   Vaping Use    Vaping status: Former    Substances: Nicotine, Flavoring    Devices: Disposable   Substance and Sexual Activity    Alcohol use: Yes    Drug use: Yes     Types: Marijuana    Sexual activity: Not Currently     Partners: Male     Birth control/protection: None, Hysterectomy       Developmental History:   Place of birth: [facility]  Siblings: 1 half brother  Childhood: Pt was raised by her father since 18 months old, would see her mother on the weekend. No abuse, trauma, or neglect.       Physical Exam:  Physical Exam    Appearance: Well-groomed with adequate hygiene, appears to be of stated age. Casually and neatly dressed, maintains good eye contact.   Behavior: Appropriate, cooperative. No acute distress.  Motor: No abnormal movements, tics or tremors are noted. No psychomotor agitation or retardation.  Speech: Coherent, spontaneous, appropriate with normal rate, volume, rhythm, and tone. Normal reaction time to questions.  No hyperverbal or pressured speech  Mood: \"I'm alright\"  Affect: Pt appears slightly anxious at times  Thought content: Negative suicidal ideations, negative homicidal ideations. Patient denies any obsession, compulsion, or phobia. No evidence of delusions.  Perceptions: Negative auditory hallucinations, negative visual hallucinations. Pt does not appear to be actively responding to internal stimuli.   Thought process: Logical, goal-directed, coherent, and linear with no evidence of flight of ideas, looseness of " "associations, thought blocking, or tangentiality.    Insight/Judgement: Fair/fair  Cognition: Alert and oriented to person, place, and date. Memory intact for recent and remote events. Attention and concentration intact.     I have reexamined the patient and the results are consistent with the previously documented exam. Martha Harvey PA-C       Vital Signs:   /78   Pulse 56   Ht 160 cm (62.99\")   Wt 62.9 kg (138 lb 9.6 oz)   BMI 24.56 kg/m²      Lab Results:   Hospital Outpatient Visit on 02/18/2025   Component Date Value Ref Range Status    Heart rate (average) 02/18/2025 71   Final    Heart rate minimum 02/18/2025 35   Final    Heart rate maximum 02/18/2025 125   Final   Office Visit on 01/20/2025   Component Date Value Ref Range Status    Glucose 01/20/2025 87  65 - 99 mg/dL Final    BUN 01/20/2025 16  6 - 20 mg/dL Final    Creatinine 01/20/2025 0.82  0.57 - 1.00 mg/dL Final    Sodium 01/20/2025 140  136 - 145 mmol/L Final    Potassium 01/20/2025 4.2  3.5 - 5.2 mmol/L Final    Chloride 01/20/2025 104  98 - 107 mmol/L Final    CO2 01/20/2025 25.0  22.0 - 29.0 mmol/L Final    Calcium 01/20/2025 9.8  8.6 - 10.5 mg/dL Final    Total Protein 01/20/2025 6.7  6.0 - 8.5 g/dL Final    Albumin 01/20/2025 4.3  3.5 - 5.2 g/dL Final    ALT (SGPT) 01/20/2025 20  1 - 33 U/L Final    AST (SGOT) 01/20/2025 16  1 - 32 U/L Final    Alkaline Phosphatase 01/20/2025 70  39 - 117 U/L Final    Total Bilirubin 01/20/2025 0.3  0.0 - 1.2 mg/dL Final    Globulin 01/20/2025 2.4  gm/dL Final    A/G Ratio 01/20/2025 1.8  g/dL Final    BUN/Creatinine Ratio 01/20/2025 19.5  7.0 - 25.0 Final    Anion Gap 01/20/2025 11.0  5.0 - 15.0 mmol/L Final    eGFR 01/20/2025 91.7  >60.0 mL/min/1.73 Final    TSH 01/20/2025 1.160  0.270 - 4.200 uIU/mL Final    Total Cholesterol 01/20/2025 184  0 - 200 mg/dL Final    Triglycerides 01/20/2025 84  0 - 150 mg/dL Final    HDL Cholesterol 01/20/2025 37 (L)  40 - 60 mg/dL Final    LDL Cholesterol  " 01/20/2025 131 (H)  0 - 100 mg/dL Final    VLDL Cholesterol 01/20/2025 16  5 - 40 mg/dL Final    LDL/HDL Ratio 01/20/2025 3.52   Final    Ferritin 01/20/2025 81.70  13.00 - 150.00 ng/mL Final    Iron 01/20/2025 75  37 - 145 mcg/dL Final    Iron Saturation (TSAT) 01/20/2025 16 (L)  20 - 50 % Final    Transferrin 01/20/2025 310  200 - 360 mg/dL Final    TIBC 01/20/2025 462  298 - 536 mcg/dL Final    WBC 01/20/2025 6.21  3.40 - 10.80 10*3/mm3 Final    RBC 01/20/2025 4.88  3.77 - 5.28 10*6/mm3 Final    Hemoglobin 01/20/2025 15.3  12.0 - 15.9 g/dL Final    Hematocrit 01/20/2025 44.7  34.0 - 46.6 % Final    MCV 01/20/2025 91.6  79.0 - 97.0 fL Final    MCH 01/20/2025 31.4  26.6 - 33.0 pg Final    MCHC 01/20/2025 34.2  31.5 - 35.7 g/dL Final    RDW 01/20/2025 14.8  12.3 - 15.4 % Final    RDW-SD 01/20/2025 49.2  37.0 - 54.0 fl Final    MPV 01/20/2025 9.9  6.0 - 12.0 fL Final    Platelets 01/20/2025 216  140 - 450 10*3/mm3 Final    Neutrophil % 01/20/2025 60.9  42.7 - 76.0 % Final    Lymphocyte % 01/20/2025 29.6  19.6 - 45.3 % Final    Monocyte % 01/20/2025 6.3  5.0 - 12.0 % Final    Eosinophil % 01/20/2025 2.4  0.3 - 6.2 % Final    Basophil % 01/20/2025 0.5  0.0 - 1.5 % Final    Immature Grans % 01/20/2025 0.3  0.0 - 0.5 % Final    Neutrophils, Absolute 01/20/2025 3.78  1.70 - 7.00 10*3/mm3 Final    Lymphocytes, Absolute 01/20/2025 1.84  0.70 - 3.10 10*3/mm3 Final    Monocytes, Absolute 01/20/2025 0.39  0.10 - 0.90 10*3/mm3 Final    Eosinophils, Absolute 01/20/2025 0.15  0.00 - 0.40 10*3/mm3 Final    Basophils, Absolute 01/20/2025 0.03  0.00 - 0.20 10*3/mm3 Final    Immature Grans, Absolute 01/20/2025 0.02  0.00 - 0.05 10*3/mm3 Final    nRBC 01/20/2025 0.0  0.0 - 0.2 /100 WBC Final   Clinical Support on 01/14/2025   Component Date Value Ref Range Status    TB Skin Test 01/16/2025 Negative   Final    Induration 01/16/2025 0  0 - 10 mm Final   Admission on 01/01/2025, Discharged on 01/01/2025   Component Date Value Ref Range  Status    Rapid Strep A Screen 01/01/2025 Negative   Final    Internal Control 01/01/2025 Passed   Final    Lot Number 01/01/2025 [id number]   Final    Expiration Date 01/01/2025 12,112,026   Final    SARS Antigen 01/01/2025 Not Detected  Not Detected, Presumptive Negative Final    Influenza A Antigen HALEY 01/01/2025 Not Detected  Not Detected Final    Influenza B Antigen HALEY 01/01/2025 Not Detected  Not Detected Final    Internal Control 01/01/2025 Passed  Passed Final    Lot Number 01/01/2025 [id number]   Final    Expiration Date 01/01/2025 10,182,228   Final   Office Visit on 10/21/2024   Component Date Value Ref Range Status    Glucose 10/21/2024 106 (H)  65 - 99 mg/dL Final    BUN 10/21/2024 12  6 - 20 mg/dL Final    Creatinine 10/21/2024 0.96  0.57 - 1.00 mg/dL Final    Sodium 10/21/2024 140  136 - 145 mmol/L Final    Potassium 10/21/2024 4.1  3.5 - 5.2 mmol/L Final    Chloride 10/21/2024 104  98 - 107 mmol/L Final    CO2 10/21/2024 24.6  22.0 - 29.0 mmol/L Final    Calcium 10/21/2024 10.3  8.6 - 10.5 mg/dL Final    Total Protein 10/21/2024 7.4  6.0 - 8.5 g/dL Final    Albumin 10/21/2024 4.5  3.5 - 5.2 g/dL Final    ALT (SGPT) 10/21/2024 18  1 - 33 U/L Final    AST (SGOT) 10/21/2024 14  1 - 32 U/L Final    Alkaline Phosphatase 10/21/2024 77  39 - 117 U/L Final    Total Bilirubin 10/21/2024 0.3  0.0 - 1.2 mg/dL Final    Globulin 10/21/2024 2.9  gm/dL Final    A/G Ratio 10/21/2024 1.6  g/dL Final    BUN/Creatinine Ratio 10/21/2024 12.5  7.0 - 25.0 Final    Anion Gap 10/21/2024 11.4  5.0 - 15.0 mmol/L Final    eGFR 10/21/2024 75.9  >60.0 mL/min/1.73 Final    Ferritin 10/21/2024 37.10  13.00 - 150.00 ng/mL Final    Iron 10/21/2024 48  37 - 145 mcg/dL Final    Iron Saturation (TSAT) 10/21/2024 11 (L)  20 - 50 % Final    Transferrin 10/21/2024 300  200 - 360 mg/dL Final    TIBC 10/21/2024 447  298 - 536 mcg/dL Final    WBC 10/21/2024 7.19  3.40 - 10.80 10*3/mm3 Final    RBC 10/21/2024 5.03  3.77 - 5.28 10*6/mm3 Final     Hemoglobin 10/21/2024 14.8  12.0 - 15.9 g/dL Final    Hematocrit 10/21/2024 45.1  34.0 - 46.6 % Final    MCV 10/21/2024 89.7  79.0 - 97.0 fL Final    MCH 10/21/2024 29.4  26.6 - 33.0 pg Final    MCHC 10/21/2024 32.8  31.5 - 35.7 g/dL Final    RDW 10/21/2024 13.4  12.3 - 15.4 % Final    RDW-SD 10/21/2024 43.8  37.0 - 54.0 fl Final    MPV 10/21/2024 10.3  6.0 - 12.0 fL Final    Platelets 10/21/2024 235  140 - 450 10*3/mm3 Final    Neutrophil % 10/21/2024 63.0  42.7 - 76.0 % Final    Lymphocyte % 10/21/2024 27.7  19.6 - 45.3 % Final    Monocyte % 10/21/2024 5.4  5.0 - 12.0 % Final    Eosinophil % 10/21/2024 3.2  0.3 - 6.2 % Final    Basophil % 10/21/2024 0.6  0.0 - 1.5 % Final    Immature Grans % 10/21/2024 0.1  0.0 - 0.5 % Final    Neutrophils, Absolute 10/21/2024 4.53  1.70 - 7.00 10*3/mm3 Final    Lymphocytes, Absolute 10/21/2024 1.99  0.70 - 3.10 10*3/mm3 Final    Monocytes, Absolute 10/21/2024 0.39  0.10 - 0.90 10*3/mm3 Final    Eosinophils, Absolute 10/21/2024 0.23  0.00 - 0.40 10*3/mm3 Final    Basophils, Absolute 10/21/2024 0.04  0.00 - 0.20 10*3/mm3 Final    Immature Grans, Absolute 10/21/2024 0.01  0.00 - 0.05 10*3/mm3 Final    nRBC 10/21/2024 0.0  0.0 - 0.2 /100 WBC Final   Office Visit on 10/03/2024   Component Date Value Ref Range Status    Glucose 10/03/2024 69  65 - 99 mg/dL Final    BUN 10/03/2024 8  6 - 20 mg/dL Final    Creatinine 10/03/2024 1.09 (H)  0.57 - 1.00 mg/dL Final    Sodium 10/03/2024 138  136 - 145 mmol/L Final    Potassium 10/03/2024 3.9  3.5 - 5.2 mmol/L Final    Chloride 10/03/2024 100  98 - 107 mmol/L Final    CO2 10/03/2024 27.0  22.0 - 29.0 mmol/L Final    Calcium 10/03/2024 10.7 (H)  8.6 - 10.5 mg/dL Final    Total Protein 10/03/2024 7.3  6.0 - 8.5 g/dL Final    Albumin 10/03/2024 4.8  3.5 - 5.2 g/dL Final    ALT (SGPT) 10/03/2024 15  1 - 33 U/L Final    AST (SGOT) 10/03/2024 11  1 - 32 U/L Final    Alkaline Phosphatase 10/03/2024 81  39 - 117 U/L Final    Total Bilirubin  10/03/2024 0.4  0.0 - 1.2 mg/dL Final    Globulin 10/03/2024 2.5  gm/dL Final    A/G Ratio 10/03/2024 1.9  g/dL Final    BUN/Creatinine Ratio 10/03/2024 7.3  7.0 - 25.0 Final    Anion Gap 10/03/2024 11.0  5.0 - 15.0 mmol/L Final    eGFR 10/03/2024 65.2  >60.0 mL/min/1.73 Final    Total Cholesterol 10/03/2024 217 (H)  0 - 200 mg/dL Final    Triglycerides 10/03/2024 99  0 - 150 mg/dL Final    HDL Cholesterol 10/03/2024 35 (L)  40 - 60 mg/dL Final    LDL Cholesterol  10/03/2024 164 (H)  0 - 100 mg/dL Final    VLDL Cholesterol 10/03/2024 18  5 - 40 mg/dL Final    LDL/HDL Ratio 10/03/2024 4.63   Final    TSH 10/03/2024 0.606  0.270 - 4.200 uIU/mL Final    Free T4 10/03/2024 1.41  0.92 - 1.68 ng/dL Final   Office Visit on 09/16/2024   Component Date Value Ref Range Status    GARDNERELLA VAGINALIS 09/16/2024 Positive (A)  Negative Final    TRICHOMONAS VAGINALIS 09/16/2024 Negative  Negative Final    KADE SPECIES 09/16/2024 Negative  Negative Final   Admission on 08/09/2024, Discharged on 08/09/2024   Component Date Value Ref Range Status    HCG, Urine QL 08/09/2024 Negative  Negative Final    ABO Type 08/09/2024 A   Final    RH type 08/09/2024 Positive   Final    Antibody Screen 08/09/2024 Negative   Final    T&S Expiration Date 08/09/2024 8/16/2024 11:59:00 PM   Final    WBC 08/09/2024 7.10  3.40 - 10.80 10*3/mm3 Final    RBC 08/09/2024 4.91  3.77 - 5.28 10*6/mm3 Final    Hemoglobin 08/09/2024 14.9  12.0 - 15.9 g/dL Final    Hematocrit 08/09/2024 45.0  34.0 - 46.6 % Final    MCV 08/09/2024 91.6  79.0 - 97.0 fL Final    MCH 08/09/2024 30.3  26.6 - 33.0 pg Final    MCHC 08/09/2024 33.1  31.5 - 35.7 g/dL Final    RDW 08/09/2024 13.6  12.3 - 15.4 % Final    RDW-SD 08/09/2024 45.8  37.0 - 54.0 fl Final    MPV 08/09/2024 9.3  6.0 - 12.0 fL Final    Platelets 08/09/2024 212  140 - 450 10*3/mm3 Final    Neutrophil % 08/09/2024 56.5  42.7 - 76.0 % Final    Lymphocyte % 08/09/2024 33.7  19.6 - 45.3 % Final    Monocyte %  08/09/2024 5.9  5.0 - 12.0 % Final    Eosinophil % 08/09/2024 3.0  0.3 - 6.2 % Final    Basophil % 08/09/2024 0.6  0.0 - 1.5 % Final    Immature Grans % 08/09/2024 0.3  0.0 - 0.5 % Final    Neutrophils, Absolute 08/09/2024 4.02  1.70 - 7.00 10*3/mm3 Final    Lymphocytes, Absolute 08/09/2024 2.39  0.70 - 3.10 10*3/mm3 Final    Monocytes, Absolute 08/09/2024 0.42  0.10 - 0.90 10*3/mm3 Final    Eosinophils, Absolute 08/09/2024 0.21  0.00 - 0.40 10*3/mm3 Final    Basophils, Absolute 08/09/2024 0.04  0.00 - 0.20 10*3/mm3 Final    Immature Grans, Absolute 08/09/2024 0.02  0.00 - 0.05 10*3/mm3 Final    nRBC 08/09/2024 0.0  0.0 - 0.2 /100 WBC Final    ABO Type 08/09/2024 A   Final    RH type 08/09/2024 Positive   Final    Case Report 08/09/2024    Final                    Value:Surgical Pathology Report                         Case: [id number]                                  Authorizing Provider:  Afshan Magana DO         Collected:           08/09/2024 12:19 PM          Ordering Location:     [facility] MAIN Received:            08/12/2024 09:46 AM                                 OR                                                                           Pathologist:           Jr Merritt MD                                                            Specimen:    Uterus, Cervix, Bilateral Fallopian Tubes , UTERUS, CERVIX, BILATERAL FALLOPIAN                     TUBES, LEFT OVARY                                                                          Clinical Information 08/09/2024    Final                    Value:Abnormal uterine bleeding (AUB)                          Uterine leiomyoma, unspecified location    Final Diagnosis 08/09/2024    Final                    Value:Uterus, cervix, bilateral fallopian tubes and ovaries, hysterectomy with                           bilateral salpingo-oophorectomy:                           - Cervix:  No significant pathologic change                           -  "Endometrium:  Secretory endometrium                           - Myometrium:  Multiple leiomyomas                           - Serosa:  No significant pathologic change                           - Left ovary:  Hemorrhagic cyst with dystrophic ossification, see comment                           - Bilateral fallopian tubes:  No significant pathologic change                              Comment 08/09/2024    Final                    Value:Sections from the left ovary show dense fibrous tissue, blood, and                           hemosiderin laden macrophages, consistent with a hemorrhagic cyst.  No                           epithelial lining or endometrial stroma is present, therefore, the cyst                           cannot be further classified.    Gross Description 08/09/2024    Final                    Value:1. Uterus, Cervix, Bilateral Fallopian Tubes.                          Received fresh and subsequently placed in formalin, labeled \"uterus,                           cervix, bilateral fallopian tubes, left ovary\" is a 637 g, 18.0 x 13.0 x                           7.0 cm aggregate of morcellated hysterectomy fragments including detached,                           bilateral, fimbriated fallopian tubes, 3.0 x 2.5 cm unoriented cervix, and                           a disrupted left ovary.  The cervix displays tan, smooth serosa with a 1.0                           x 0.5 cm cervical os.  The disrupted fibroids ranging in size from 1.5-7.0                           cm and each display a white, whorled cut surface.  An endometrial cavity                           is not grossly appreciated.  The 5.5 cm in length by 0.7 cm average                           diameter fallopian tubes display shaggy serosa and a pinpoint lumen upon                           sectioning.  The 10 g, 3.5 x 2.5 x 2.5 cm disrupted left ovary is                           diffusely calcified, and sectioning reveals a calcified cut surface and  "                          hemorrhagic material.  Representative sections are submitted as follows:                           1A-anterior/posterior cervix; 1B-possible endometrial cavity;                           8Q-[id number] fallopian tubes, fimbria entirely; 4M-1H-vwzdaptp of                           fibroids; 1G-calcified ovary, following decalcification.  1H-additional                           sections of left ovary, following decalcification.  8/15/2024                           ASIF    Microscopic Description 08/09/2024    Final                    Value:Microscopic examination performed.   Lab on 05/21/2024   Component Date Value Ref Range Status    Ferritin 05/21/2024 49.40  13.00 - 150.00 ng/mL Final    Iron 05/21/2024 68  37 - 145 mcg/dL Final   Office Visit on 04/08/2024   Component Date Value Ref Range Status    Glucose 04/08/2024 91  65 - 99 mg/dL Final    BUN 04/08/2024 11  6 - 20 mg/dL Final    Creatinine 04/08/2024 0.80  0.57 - 1.00 mg/dL Final    Sodium 04/08/2024 142  136 - 145 mmol/L Final    Potassium 04/08/2024 4.5  3.5 - 5.2 mmol/L Final    Chloride 04/08/2024 106  98 - 107 mmol/L Final    CO2 04/08/2024 28.0  22.0 - 29.0 mmol/L Final    Calcium 04/08/2024 9.8  8.6 - 10.5 mg/dL Final    Total Protein 04/08/2024 6.9  6.0 - 8.5 g/dL Final    Albumin 04/08/2024 4.7  3.5 - 5.2 g/dL Final    ALT (SGPT) 04/08/2024 19  1 - 33 U/L Final    AST (SGOT) 04/08/2024 14  1 - 32 U/L Final    Alkaline Phosphatase 04/08/2024 81  39 - 117 U/L Final    Total Bilirubin 04/08/2024 0.3  0.0 - 1.2 mg/dL Final    Globulin 04/08/2024 2.2  gm/dL Final    A/G Ratio 04/08/2024 2.1  g/dL Final    BUN/Creatinine Ratio 04/08/2024 13.8  7.0 - 25.0 Final    Anion Gap 04/08/2024 8.0  5.0 - 15.0 mmol/L Final    eGFR 04/08/2024 95.1  >60.0 mL/min/1.73 Final    Total Cholesterol 04/08/2024 192  0 - 200 mg/dL Final    Triglycerides 04/08/2024 80  0 - 150 mg/dL Final    HDL Cholesterol 04/08/2024 41  40 - 60 mg/dL Final    LDL  Cholesterol  04/08/2024 136 (H)  0 - 100 mg/dL Final    VLDL Cholesterol 04/08/2024 15  5 - 40 mg/dL Final    LDL/HDL Ratio 04/08/2024 3.29   Final    Hepatitis C Ab 04/08/2024 Non-Reactive  Non-Reactive Final    TSH 04/08/2024 2.430  0.270 - 4.200 uIU/mL Final    Free T4 04/08/2024 1.11  0.93 - 1.70 ng/dL Final    WBC 04/08/2024 6.43  3.40 - 10.80 10*3/mm3 Final    RBC 04/08/2024 4.77  3.77 - 5.28 10*6/mm3 Final    Hemoglobin 04/08/2024 14.3  12.0 - 15.9 g/dL Final    Hematocrit 04/08/2024 42.6  34.0 - 46.6 % Final    MCV 04/08/2024 89.3  79.0 - 97.0 fL Final    MCH 04/08/2024 30.0  26.6 - 33.0 pg Final    MCHC 04/08/2024 33.6  31.5 - 35.7 g/dL Final    RDW 04/08/2024 12.7  12.3 - 15.4 % Final    RDW-SD 04/08/2024 41.5  37.0 - 54.0 fl Final    MPV 04/08/2024 10.0  6.0 - 12.0 fL Final    Platelets 04/08/2024 205  140 - 450 10*3/mm3 Final    Neutrophil % 04/08/2024 53.5  42.7 - 76.0 % Final    Lymphocyte % 04/08/2024 32.8  19.6 - 45.3 % Final    Monocyte % 04/08/2024 7.8  5.0 - 12.0 % Final    Eosinophil % 04/08/2024 5.1  0.3 - 6.2 % Final    Basophil % 04/08/2024 0.6  0.0 - 1.5 % Final    Immature Grans % 04/08/2024 0.2  0.0 - 0.5 % Final    Neutrophils, Absolute 04/08/2024 3.44  1.70 - 7.00 10*3/mm3 Final    Lymphocytes, Absolute 04/08/2024 2.11  0.70 - 3.10 10*3/mm3 Final    Monocytes, Absolute 04/08/2024 0.50  0.10 - 0.90 10*3/mm3 Final    Eosinophils, Absolute 04/08/2024 0.33  0.00 - 0.40 10*3/mm3 Final    Basophils, Absolute 04/08/2024 0.04  0.00 - 0.20 10*3/mm3 Final    Immature Grans, Absolute 04/08/2024 0.01  0.00 - 0.05 10*3/mm3 Final    nRBC 04/08/2024 0.0  0.0 - 0.2 /100 WBC Final   There may be more visits with results that are not included.       EKG Results:  No orders to display       Imaging Results:  CT Head Without Contrast    Result Date: 1/16/2025  Impression: 1.No acute intracranial process identified. 2.Mild mucosal disease within left sphenoid sinus. Electronically Signed: Travis Horan MD   1/16/2025 7:00 PM EST  Workstation ID: [id number]    XR Chest PA & Lateral (In Office)    Result Date: 1/7/2025  Impression: No acute process. Electronically Signed: Rito Lambert MD  1/7/2025 3:10 PM EST  Workstation ID: [id number]        Assessment & Plan   Diagnoses and all orders for this visit:    1. Moderate episode of recurrent major depressive disorder (Primary)    2. Generalized anxiety disorder    3. Insomnia, unspecified type    4. Uncomplicated alcohol dependence  -     naltrexone (DEPADE) 50 MG tablet; Take 0.5 tab PO QD x 4 days, if tolerated can increase to 1 tab PO QD  Dispense: 30 tablet; Refill: 0        Patient screened positive for depression based on a PHQ-9 score of 9 on 3/6/2025. Follow-up recommendations include: Prescribed antidepressant medication treatment, Referral to Mental Health specialist, Suicide Risk Assessment performed, and see assessment below .    Dx: MDD, HELENE, insomnia, alcohol dependence.  We will continue Celexa for management depression, anxiety, and overall mood.  Discussed giving Celexa full 4 to 6 weeks for the full effect.  We will continue with plan of starting on Seroquel for depression, anxiety, insomnia, and overall mood.  Counseled on the need to decrease alcohol use, will start on naltrexone for management of alcohol dependence.  Reiterated need for psychotherapy.  Instructed patient to contact the office for any new or worsening symptoms or any other concerns.  Follow-up in 2 weeks.  Addressed all questions and concerns.    I have reviewed the assessment and plan and verified the accuracy of it. No changes to assessment and plan since the information was documented. Martha Harvey PA-C 03/17/25     Visit Diagnoses:    ICD-10-CM ICD-9-CM   1. Moderate episode of recurrent major depressive disorder  F33.1 296.32   2. Generalized anxiety disorder  F41.1 300.02   3. Insomnia, unspecified type  G47.00 780.52   4. Uncomplicated alcohol dependence  F10.20 303.90          PLAN:  Safety: Discussed concerns about access to firearms and the need to have a trusted friend or family member hold onto her firearm until mood improves.  Patient states she will give her gun to her father.  Therapy: We will refer for psychotherapy  Risk Assessment: Risk of self-harm acutely is moderate to severe.  Risk factors include anxiety disorder, mood disorder, access to firearms, and recent psychosocial stressors (pandemic). Protective factors include no family history, no present SI, no history of suicide attempts or self-harm in the past, minimal AODA, healthcare seeking, future orientation, willingness to engage in care.  Risk of self-harm chronically is also moderate to severe, but could be further elevated in the event of treatment noncompliance and/or AODA.  Labs/Diagnostics Ordered:   No orders of the defined types were placed in this encounter.    Medications:   New Medications Ordered This Visit   Medications    naltrexone (DEPADE) 50 MG tablet     Sig: Take 0.5 tab PO QD x 4 days, if tolerated can increase to 1 tab PO QD     Dispense:  30 tablet     Refill:  0       Discussed all risks, benefits, alternatives, and side effects of citalopram including but not limited to GI upset, sexual dysfunction, bleeding risk, seizure risk, insomnia, sedation, headache, activation of darnell or hypomania, increased fragility fracture risk, hyponatremia, ocular effects, dose-dependent prolonged QT interval, withdrawal syndrome following abrupt discontinuation, serotonin syndrome, and activation of suicidal ideation and behavior.  Pt educated on the need to practice safe sex while taking this med. Discussed the need for pt to immediately call the office for any new or worsening symptoms, such as worsening depression; feeling nervous or restless; suicidal thoughts or actions; or other changes changes in mood or behavior, and all other concerns. Pt educated on med compliance and the risks of suddenly  stopping this medication or missing doses. Pt verbalized understanding and is agreeable to taking citalopram. Addressed all questions and concerns.     Discussed all risks, benefits, alternatives, and side effects of Quetiapine, including but not limited to GI upset, agitation, dizziness, headache, sexual dysfunction, sedation, weight gain, anticholinergic effects, cataract risk, dyslipidemia, extrapyramidal symptoms (dystonia, drug-induced parkinsonism, akathisia, tardive dyskinesia), lowering of seizure threshold, hematologic abnormalities, hyperglycemia, hypothyroidism, increased mortality in elderly patients with dementia-related psychosis, neuroleptic malignant syndrome, orthostatic hypotension, falls risk in older adults, prolonged QT interval, and temperature dysregulation. Pt instructed to avoid driving and doing other tasks or actions that require to be alert until knowing how the drug affects them.  Pt educated on the need to practice safe sex while taking this med. Discussed the need for pt to immediately call the office for any new or worsening symptoms, such as worsening depression; feeling nervous or restless; suicidal thoughts or actions; or other changes changes in mood or behavior, and all other concerns. Pt educated on med compliance and the risks of suddenly stopping this medication or missing doses. Pt verbalized understanding and is agreeable to taking Quetiapine. Addressed all questions and concerns.     Naltrexone, risks, benefits, alternatives discussed with patient including GI upset, nausea, vomiting, decreased appetite, dizziness, anxiety.  After discussion of these risks and benefits, the patient voiced understanding and agreed to proceed.      Follow up: F/u in 2 weeks.      TREATMENT PLAN/GOALS: Continue supportive psychotherapy efforts and medications as indicated. Treatment and medication options discussed during today's visit. Patient ackowledged and verbally consented to continue  with current treatment plan and was educated on the importance of compliance with treatment and follow-up appointments.    MEDICATION ISSUES:  GAUTAM reviewed as expected.  Discussed medication options and treatment plan of prescribed medication as well as the risks, benefits, and side effects including potential falls, possible impaired driving and metabolic adversities among others. Patient is agreeable to call the office with any worsening of symptoms or onset of side effects. Patient is agreeable to call 911 or go to the nearest ER should he/she begin having SI/HI. No medication side effects or related complaints today.            This document has been electronically signed by Martha Harvey PA-C  March 17, 2025 10:03 EDT      Part of this note may be an electronic transcription/translation of spoken language to printed text using the Dragon Dictation System.

## 2025-03-20 ENCOUNTER — TELEPHONE (OUTPATIENT)
Dept: PSYCHIATRY | Facility: CLINIC | Age: 45
End: 2025-03-20
Payer: COMMERCIAL

## 2025-03-20 DIAGNOSIS — G47.00 INSOMNIA, UNSPECIFIED TYPE: Primary | ICD-10-CM

## 2025-03-20 DIAGNOSIS — F33.1 MODERATE EPISODE OF RECURRENT MAJOR DEPRESSIVE DISORDER: ICD-10-CM

## 2025-03-20 RX ORDER — QUETIAPINE FUMARATE 25 MG/1
TABLET, FILM COATED ORAL
Qty: 60 TABLET | Refills: 0 | Status: SHIPPED | OUTPATIENT
Start: 2025-03-20

## 2025-03-20 NOTE — TELEPHONE ENCOUNTER
PT PHONED IN.    PT WAS STARTED ON SEROQUEL 100 MG; SHE TOOK ONE MONDAY AND SHE LITERALLY SLEPT FOR 2 DAYS AND SHE IS STILL FEELING IT TODAY.     IT'S EITHER TO STRONG OR SHE NEEDS SOMETHING LOWER DOSE.    PROVIDER PLEASE ADVISE.

## 2025-03-20 NOTE — TELEPHONE ENCOUNTER
Please inform the patient that I decreased the dose to 25 mg where it says to take 1 to 2 tablets as needed for sleep. Please advise the patient that she can start with half a tablet first and if that does not work, she can proceed to taking 1 full tablet or up to 2 tablets as needed for sleep. Rx has been sent to the pharmacy

## 2025-03-24 RX ORDER — CETIRIZINE HYDROCHLORIDE 10 MG/1
10 TABLET ORAL DAILY
Qty: 30 TABLET | Refills: 1 | Status: SHIPPED | OUTPATIENT
Start: 2025-03-24

## 2025-03-24 RX ORDER — TRAZODONE HYDROCHLORIDE 100 MG/1
100 TABLET ORAL NIGHTLY
Qty: 90 TABLET | Refills: 1 | Status: SHIPPED | OUTPATIENT
Start: 2025-03-24

## 2025-04-03 ENCOUNTER — OFFICE VISIT (OUTPATIENT)
Dept: BEHAVIORAL HEALTH | Facility: CLINIC | Age: 45
End: 2025-04-03
Payer: COMMERCIAL

## 2025-04-03 VITALS
HEART RATE: 50 BPM | BODY MASS INDEX: 25.45 KG/M2 | WEIGHT: 143.6 LBS | DIASTOLIC BLOOD PRESSURE: 68 MMHG | HEIGHT: 63 IN | SYSTOLIC BLOOD PRESSURE: 116 MMHG

## 2025-04-03 DIAGNOSIS — F99 INSOMNIA DUE TO OTHER MENTAL DISORDER: ICD-10-CM

## 2025-04-03 DIAGNOSIS — F51.05 INSOMNIA DUE TO OTHER MENTAL DISORDER: ICD-10-CM

## 2025-04-03 DIAGNOSIS — F10.21 ALCOHOL DEPENDENCE IN REMISSION: ICD-10-CM

## 2025-04-03 DIAGNOSIS — F33.41 RECURRENT MAJOR DEPRESSIVE DISORDER, IN PARTIAL REMISSION: ICD-10-CM

## 2025-04-03 DIAGNOSIS — F41.1 GENERALIZED ANXIETY DISORDER: Primary | ICD-10-CM

## 2025-04-03 RX ORDER — CITALOPRAM HYDROBROMIDE 20 MG/1
20 TABLET ORAL EVERY MORNING
Qty: 90 TABLET | Refills: 0 | Status: SHIPPED | OUTPATIENT
Start: 2025-04-03

## 2025-04-03 RX ORDER — TRAZODONE HYDROCHLORIDE 100 MG/1
200 TABLET ORAL NIGHTLY PRN
Qty: 180 TABLET | Refills: 0 | Status: SHIPPED | OUTPATIENT
Start: 2025-04-03

## 2025-04-03 NOTE — PROGRESS NOTES
Chief Complaint:  Depression     History of Present Illness: Frances Rao is a 44 y.o. female who presents to the office today for f/u of mood. Pt stopped naltrexone after a few days since she didn't notice any change. Pt c/o depression that comes and goes, occurs a few times a month. No anhedonia. Pt has been going to the gym. No hopelessness. Pt denies having any current SI or HI at this time. No SI since last visit. Pt c/o difficulty sleeping despite taking Seroquel 50mg. Pt is unable to take Seroquel if she has to go to work the next day.  Patient states she is sleeping well with trazodone 200 mg.  Patient was recently prescribed trazodone by PCP.  No excessive worrying. Pt states her mood and job have been going well. She also c/o irritability, has improved. Pt denies alcohol cravings. Pt denies binge drinking since last visit. Pt is not concerned about alcohol use since last visit.     I have reviewed/confirmed previously documented HPI with no changes.     Answers submitted by the patient for this visit:  Depression (Submitted on 3/10/2025)  Chief Complaint: Depression  Visit: follow-up  Frequency: most days  Severity: mild  excessive worry: No  insomnia: Yes  irritability: Yes  malaise/fatigue: Yes  obsessions: No  hypersomnia: No  difficulty controlling mood: No  difficulty staying asleep: Yes  difficulty falling asleep: Yes  Hours of sleep per night: 5 Hours  Medication compliance: %      Medical Record Review: Reviewed office visit note from 11/18/2024, history of orthostatic hypotension, has increased water to 1 bottle per day.  Patient complains of insomnia and does not think trazodone is working.  Patient has anxiety she has increased since restarting work.  She midst of feeling down and sad.  Patient is going through relationship issues.  Patient denies SI and HI.  History of RLS, Anxiety, depression, migraine      PHQ-9 Depression Screening  Little interest or pleasure in doing things?      Feeling down, depressed, or hopeless?     PHQ-2 Total Score     Trouble falling or staying asleep, or sleeping too much?     Feeling tired or having little energy?     Poor appetite or overeating?     Feeling bad about yourself - or that you are a failure or have let yourself or your family down?     Trouble concentrating on things, such as reading the newspaper or watching television?     Moving or speaking so slowly that other people could have noticed? Or the opposite - being so fidgety or restless that you have been moving around a lot more than usual?       Thoughts that you would be better off dead, or of hurting yourself in some way?     PHQ-9 Total Score     If you checked off any problems, how difficult have these problems made it for you to do your work, take care of things at home, or get along with other people?                 HELENE-7           ROS:  Review of Systems   Constitutional:  Negative for appetite change, chills, diaphoresis, fatigue, fever and unexpected weight change.   HENT:  Positive for congestion, sore throat and tinnitus. Negative for drooling.    Eyes:  Negative for visual disturbance.   Respiratory:  Positive for chest tightness and shortness of breath. Negative for cough.    Cardiovascular:  Positive for chest pain and palpitations.   Gastrointestinal:  Positive for abdominal pain, diarrhea and nausea. Negative for constipation and vomiting.   Endocrine: Positive for heat intolerance. Negative for cold intolerance.   Genitourinary:  Negative for difficulty urinating and dysuria.   Musculoskeletal:  Positive for arthralgias, back pain and myalgias. Negative for neck pain.   Skin:  Negative for rash.   Neurological:  Positive for dizziness and headaches. Negative for syncope, weakness and numbness.   Psychiatric/Behavioral:  Positive for agitation and dysphoric mood. Negative for confusion, self-injury, sleep disturbance and suicidal ideas. The patient is nervous/anxious.         Problem List:  Patient Active Problem List   Diagnosis    Cigarette nicotine dependence without complication    Thickened endometrium    Hot flashes    Restless legs syndrome (RLS)    Insomnia due to medical condition    Abnormal uterine bleeding (AUB)    Uterine leiomyoma    Orthostatic hypertension    Syncope    Rectal bleed    Generalized anxiety disorder    Lightheadedness       Current Medications:   Current Outpatient Medications   Medication Sig Dispense Refill    Airsupra 90-80 MCG/ACT aerosol       albuterol sulfate  (90 Base) MCG/ACT inhaler Inhale 2 puffs Every 4 (Four) Hours As Needed for Wheezing. 8 g 2    cetirizine (zyrTEC) 10 MG tablet Take 1 tablet by mouth Daily. 30 tablet 1    citalopram (CeleXA) 20 MG tablet Take 1 tablet by mouth Every Morning. 90 tablet 0    cloNIDine (CATAPRES) 0.1 MG tablet Take 1 tablet by mouth Every Night. 90 tablet 1    fluticasone (FLONASE) 50 MCG/ACT nasal spray Administer 2 sprays into the nostril(s) as directed by provider Daily. 11.1 g 1    hydrOXYzine (ATARAX) 25 MG tablet       meclizine (ANTIVERT) 12.5 MG tablet Take 1 tablet by mouth 3 (Three) Times a Day As Needed for Dizziness. 30 tablet 0    pramipexole (MIRAPEX) 0.25 MG tablet       traZODone (DESYREL) 100 MG tablet Take 2 tablets by mouth At Night As Needed for Sleep. 180 tablet 0     No current facility-administered medications for this visit.       Discontinued Medications:  Medications Discontinued During This Encounter   Medication Reason    naltrexone (DEPADE) 50 MG tablet     QUEtiapine (SEROquel) 25 MG tablet     citalopram (CeleXA) 20 MG tablet Reorder    traZODone (DESYREL) 100 MG tablet Reorder         Allergy:   No Known Allergies     Past Medical History:  Past Medical History:   Diagnosis Date    Abnormal Pap smear of cervix     Abnormal uterine bleeding (AUB)     Anxiety     Depression 2012    Fibroid     History of sleep walking     Insomnia     Migraine     Orthostatic  hypertension 10/21/2024    Ovarian cyst        Past Surgical History:  Past Surgical History:   Procedure Laterality Date    COLPOSCOPY      D & C HYSTEROSCOPY N/A 02/01/2024    Procedure: DILATATION AND CURETTAGE HYSTEROSCOPY;  Surgeon: Afshan Magana DO;  Location: [facility] MAIN OR;  Service: Gynecology;  Laterality: N/A;    HYSTERECTOMY      TOTAL LAPAROSCOPIC HYSTERECTOMY N/A 08/09/2024    Procedure: ROBOTIC ASSISTED LAPAROSCOPIC HYSTERECTOMY WITH BILATERAL SALPINGECTOMY, CYTSOCOPY;  Surgeon: Afshan Magana DO;  Location: [facility] MAIN OR;  Service: Robotics - DaVinci;  Laterality: N/A;    WISDOM TOOTH EXTRACTION         Past Psychiatric History:  Began Treatment: 2 years ago   Diagnoses: Anxiety  Psychiatrist: Denies  Therapist: Denies  Admission History: Denies  Medications/Treatment: Celexa, Clonidine, Trazodone, Hydroxyzine, effexor, Seroquel  Self Harm: Denies  Suicide Attempts: Denies  Postpartum depression: Denies    Family Psychiatric History:   Diagnoses: Her half brother has a h/o bipolar.   Substance use: Her pat grandfather and mat grandfather had a h/o EtOH abuse.   Suicide Attempts/Completions: Denies    Family History   Problem Relation Age of Onset    COPD Mother     Restless legs syndrome Mother     Anxiety disorder Mother     Arthritis Mother     COPD Father     Insomnia Father     Arthritis Father     Breast cancer Paternal Great-Grandmother     Breast cancer Maternal Great-Grandmother     Breast cancer Maternal Grandmother     Breast cancer Paternal Grandmother     Uterine cancer Neg Hx     Ovarian cancer Neg Hx     Colon cancer Neg Hx     Melanoma Neg Hx     Prostate cancer Neg Hx        Substance Abuse History:   Alcohol use: Pt will a few shots of whiskey, has been nightly for the past few weeks.   Nicotine: Pt smokes 0.5PPD.   Illicit Drug Use: Pt smokes marijuana.   Longest Period Sober: 2 years from EtOH.   Rehab/AA/NA: Denies    Social History:  Living Situation: Pt lives with her  "father.   Marital/Relationship History: Single  Children: Denies  Work History/Occupation: Pt works as [employer].   Education: Pt completed high school, no college.    History: Denies  Legal: Denies    Social History     Socioeconomic History    Marital status: Single   Tobacco Use    Smoking status: Every Day     Current packs/day: 0.50     Average packs/day: 0.5 packs/day for 50.6 years (25.3 ttl pk-yrs)     Types: Cigarettes     Start date: 8/12/1996    Smokeless tobacco: Never    Tobacco comments:     Last 1/31/24   Vaping Use    Vaping status: Former    Substances: Nicotine, Flavoring    Devices: Disposable   Substance and Sexual Activity    Alcohol use: Yes    Drug use: Yes     Types: Marijuana    Sexual activity: Not Currently     Partners: Male     Birth control/protection: None, Hysterectomy       Developmental History:   [facility] of birth: [facility]  Siblings: 1 half brother  Childhood: Pt was raised by her father since 18 months old, would see her mother on the weekend. No abuse, trauma, or neglect.       Physical Exam:  Physical Exam    Appearance: Well-groomed with adequate hygiene, appears to be of stated age. Casually and neatly dressed, maintains good eye contact.   Behavior: Appropriate, cooperative. No acute distress.  Motor: No abnormal movements, tics or tremors are noted. No psychomotor agitation or retardation.  Speech: Coherent, spontaneous, appropriate with normal rate, volume, rhythm, and tone. Normal reaction time to questions.  No hyperverbal or pressured speech  Mood: \"I'm good\"  Affect: Full range, appropriate, congruent with spontaneous emotional reactivity. Normal intensity. No emotional blunting.   Thought content: Negative suicidal ideations, negative homicidal ideations. Patient denies any obsession, compulsion, or phobia. No evidence of delusions.  Perceptions: Negative auditory hallucinations, negative visual hallucinations. Pt does not appear to be actively " "responding to internal stimuli.   Thought process: Logical, goal-directed, coherent, and linear with no evidence of flight of ideas, looseness of associations, thought blocking, or tangentiality.    Insight/Judgement: Fair/fair  Cognition: Alert and oriented to person, place, and date. Memory intact for recent and remote events. Attention and concentration intact.     I have reexamined the patient and the results are consistent with the previously documented exam. Martha Harvey PA-C         Vital Signs:   /68   Pulse 50   Ht 160 cm (62.99\")   Wt 65.1 kg (143 lb 9.6 oz)   BMI 25.44 kg/m²      Lab Results:   Hospital Outpatient Visit on 02/18/2025   Component Date Value Ref Range Status    Heart rate (average) 02/18/2025 71   Final    Heart rate minimum 02/18/2025 35   Final    Heart rate maximum 02/18/2025 125   Final   Office Visit on 01/20/2025   Component Date Value Ref Range Status    Glucose 01/20/2025 87  65 - 99 mg/dL Final    BUN 01/20/2025 16  6 - 20 mg/dL Final    Creatinine 01/20/2025 0.82  0.57 - 1.00 mg/dL Final    Sodium 01/20/2025 140  136 - 145 mmol/L Final    Potassium 01/20/2025 4.2  3.5 - 5.2 mmol/L Final    Chloride 01/20/2025 104  98 - 107 mmol/L Final    CO2 01/20/2025 25.0  22.0 - 29.0 mmol/L Final    Calcium 01/20/2025 9.8  8.6 - 10.5 mg/dL Final    Total Protein 01/20/2025 6.7  6.0 - 8.5 g/dL Final    Albumin 01/20/2025 4.3  3.5 - 5.2 g/dL Final    ALT (SGPT) 01/20/2025 20  1 - 33 U/L Final    AST (SGOT) 01/20/2025 16  1 - 32 U/L Final    Alkaline Phosphatase 01/20/2025 70  39 - 117 U/L Final    Total Bilirubin 01/20/2025 0.3  0.0 - 1.2 mg/dL Final    Globulin 01/20/2025 2.4  gm/dL Final    A/G Ratio 01/20/2025 1.8  g/dL Final    BUN/Creatinine Ratio 01/20/2025 19.5  7.0 - 25.0 Final    Anion Gap 01/20/2025 11.0  5.0 - 15.0 mmol/L Final    eGFR 01/20/2025 91.7  >60.0 mL/min/1.73 Final    TSH 01/20/2025 1.160  0.270 - 4.200 uIU/mL Final    Total Cholesterol 01/20/2025 184  0 - 200 " mg/dL Final    Triglycerides 01/20/2025 84  0 - 150 mg/dL Final    HDL Cholesterol 01/20/2025 37 (L)  40 - 60 mg/dL Final    LDL Cholesterol  01/20/2025 131 (H)  0 - 100 mg/dL Final    VLDL Cholesterol 01/20/2025 16  5 - 40 mg/dL Final    LDL/HDL Ratio 01/20/2025 3.52   Final    Ferritin 01/20/2025 81.70  13.00 - 150.00 ng/mL Final    Iron 01/20/2025 75  37 - 145 mcg/dL Final    Iron Saturation (TSAT) 01/20/2025 16 (L)  20 - 50 % Final    Transferrin 01/20/2025 310  200 - 360 mg/dL Final    TIBC 01/20/2025 462  298 - 536 mcg/dL Final    WBC 01/20/2025 6.21  3.40 - 10.80 10*3/mm3 Final    RBC 01/20/2025 4.88  3.77 - 5.28 10*6/mm3 Final    Hemoglobin 01/20/2025 15.3  12.0 - 15.9 g/dL Final    Hematocrit 01/20/2025 44.7  34.0 - 46.6 % Final    MCV 01/20/2025 91.6  79.0 - 97.0 fL Final    MCH 01/20/2025 31.4  26.6 - 33.0 pg Final    MCHC 01/20/2025 34.2  31.5 - 35.7 g/dL Final    RDW 01/20/2025 14.8  12.3 - 15.4 % Final    RDW-SD 01/20/2025 49.2  37.0 - 54.0 fl Final    MPV 01/20/2025 9.9  6.0 - 12.0 fL Final    Platelets 01/20/2025 216  140 - 450 10*3/mm3 Final    Neutrophil % 01/20/2025 60.9  42.7 - 76.0 % Final    Lymphocyte % 01/20/2025 29.6  19.6 - 45.3 % Final    Monocyte % 01/20/2025 6.3  5.0 - 12.0 % Final    Eosinophil % 01/20/2025 2.4  0.3 - 6.2 % Final    Basophil % 01/20/2025 0.5  0.0 - 1.5 % Final    Immature Grans % 01/20/2025 0.3  0.0 - 0.5 % Final    Neutrophils, Absolute 01/20/2025 3.78  1.70 - 7.00 10*3/mm3 Final    Lymphocytes, Absolute 01/20/2025 1.84  0.70 - 3.10 10*3/mm3 Final    Monocytes, Absolute 01/20/2025 0.39  0.10 - 0.90 10*3/mm3 Final    Eosinophils, Absolute 01/20/2025 0.15  0.00 - 0.40 10*3/mm3 Final    Basophils, Absolute 01/20/2025 0.03  0.00 - 0.20 10*3/mm3 Final    Immature Grans, Absolute 01/20/2025 0.02  0.00 - 0.05 10*3/mm3 Final    nRBC 01/20/2025 0.0  0.0 - 0.2 /100 WBC Final   Clinical Support on 01/14/2025   Component Date Value Ref Range Status    TB Skin Test 01/16/2025  Negative   Final    Induration 01/16/2025 0  0 - 10 mm Final   Admission on 01/01/2025, Discharged on 01/01/2025   Component Date Value Ref Range Status    Rapid Strep A Screen 01/01/2025 Negative   Final    Internal Control 01/01/2025 Passed   Final    Lot Number 01/01/2025 [id number]   Final    Expiration Date 01/01/2025 12,112,026   Final    SARS Antigen 01/01/2025 Not Detected  Not Detected, Presumptive Negative Final    Influenza A Antigen HALEY 01/01/2025 Not Detected  Not Detected Final    Influenza B Antigen HALEY 01/01/2025 Not Detected  Not Detected Final    Internal Control 01/01/2025 Passed  Passed Final    Lot Number 01/01/2025 [id number]   Final    Expiration Date 01/01/2025 10,182,025   Final   Office Visit on 10/21/2024   Component Date Value Ref Range Status    Glucose 10/21/2024 106 (H)  65 - 99 mg/dL Final    BUN 10/21/2024 12  6 - 20 mg/dL Final    Creatinine 10/21/2024 0.96  0.57 - 1.00 mg/dL Final    Sodium 10/21/2024 140  136 - 145 mmol/L Final    Potassium 10/21/2024 4.1  3.5 - 5.2 mmol/L Final    Chloride 10/21/2024 104  98 - 107 mmol/L Final    CO2 10/21/2024 24.6  22.0 - 29.0 mmol/L Final    Calcium 10/21/2024 10.3  8.6 - 10.5 mg/dL Final    Total Protein 10/21/2024 7.4  6.0 - 8.5 g/dL Final    Albumin 10/21/2024 4.5  3.5 - 5.2 g/dL Final    ALT (SGPT) 10/21/2024 18  1 - 33 U/L Final    AST (SGOT) 10/21/2024 14  1 - 32 U/L Final    Alkaline Phosphatase 10/21/2024 77  39 - 117 U/L Final    Total Bilirubin 10/21/2024 0.3  0.0 - 1.2 mg/dL Final    Globulin 10/21/2024 2.9  gm/dL Final    A/G Ratio 10/21/2024 1.6  g/dL Final    BUN/Creatinine Ratio 10/21/2024 12.5  7.0 - 25.0 Final    Anion Gap 10/21/2024 11.4  5.0 - 15.0 mmol/L Final    eGFR 10/21/2024 75.9  >60.0 mL/min/1.73 Final    Ferritin 10/21/2024 37.10  13.00 - 150.00 ng/mL Final    Iron 10/21/2024 48  37 - 145 mcg/dL Final    Iron Saturation (TSAT) 10/21/2024 11 (L)  20 - 50 % Final    Transferrin 10/21/2024 300  200 - 360 mg/dL Final     TIBC 10/21/2024 447  298 - 536 mcg/dL Final    WBC 10/21/2024 7.19  3.40 - 10.80 10*3/mm3 Final    RBC 10/21/2024 5.03  3.77 - 5.28 10*6/mm3 Final    Hemoglobin 10/21/2024 14.8  12.0 - 15.9 g/dL Final    Hematocrit 10/21/2024 45.1  34.0 - 46.6 % Final    MCV 10/21/2024 89.7  79.0 - 97.0 fL Final    MCH 10/21/2024 29.4  26.6 - 33.0 pg Final    MCHC 10/21/2024 32.8  31.5 - 35.7 g/dL Final    RDW 10/21/2024 13.4  12.3 - 15.4 % Final    RDW-SD 10/21/2024 43.8  37.0 - 54.0 fl Final    MPV 10/21/2024 10.3  6.0 - 12.0 fL Final    Platelets 10/21/2024 235  140 - 450 10*3/mm3 Final    Neutrophil % 10/21/2024 63.0  42.7 - 76.0 % Final    Lymphocyte % 10/21/2024 27.7  19.6 - 45.3 % Final    Monocyte % 10/21/2024 5.4  5.0 - 12.0 % Final    Eosinophil % 10/21/2024 3.2  0.3 - 6.2 % Final    Basophil % 10/21/2024 0.6  0.0 - 1.5 % Final    Immature Grans % 10/21/2024 0.1  0.0 - 0.5 % Final    Neutrophils, Absolute 10/21/2024 4.53  1.70 - 7.00 10*3/mm3 Final    Lymphocytes, Absolute 10/21/2024 1.99  0.70 - 3.10 10*3/mm3 Final    Monocytes, Absolute 10/21/2024 0.39  0.10 - 0.90 10*3/mm3 Final    Eosinophils, Absolute 10/21/2024 0.23  0.00 - 0.40 10*3/mm3 Final    Basophils, Absolute 10/21/2024 0.04  0.00 - 0.20 10*3/mm3 Final    Immature Grans, Absolute 10/21/2024 0.01  0.00 - 0.05 10*3/mm3 Final    nRBC 10/21/2024 0.0  0.0 - 0.2 /100 WBC Final   Office Visit on 10/03/2024   Component Date Value Ref Range Status    Glucose 10/03/2024 69  65 - 99 mg/dL Final    BUN 10/03/2024 8  6 - 20 mg/dL Final    Creatinine 10/03/2024 1.09 (H)  0.57 - 1.00 mg/dL Final    Sodium 10/03/2024 138  136 - 145 mmol/L Final    Potassium 10/03/2024 3.9  3.5 - 5.2 mmol/L Final    Chloride 10/03/2024 100  98 - 107 mmol/L Final    CO2 10/03/2024 27.0  22.0 - 29.0 mmol/L Final    Calcium 10/03/2024 10.7 (H)  8.6 - 10.5 mg/dL Final    Total Protein 10/03/2024 7.3  6.0 - 8.5 g/dL Final    Albumin 10/03/2024 4.8  3.5 - 5.2 g/dL Final    ALT (SGPT) 10/03/2024 15   1 - 33 U/L Final    AST (SGOT) 10/03/2024 11  1 - 32 U/L Final    Alkaline Phosphatase 10/03/2024 81  39 - 117 U/L Final    Total Bilirubin 10/03/2024 0.4  0.0 - 1.2 mg/dL Final    Globulin 10/03/2024 2.5  gm/dL Final    A/G Ratio 10/03/2024 1.9  g/dL Final    BUN/Creatinine Ratio 10/03/2024 7.3  7.0 - 25.0 Final    Anion Gap 10/03/2024 11.0  5.0 - 15.0 mmol/L Final    eGFR 10/03/2024 65.2  >60.0 mL/min/1.73 Final    Total Cholesterol 10/03/2024 217 (H)  0 - 200 mg/dL Final    Triglycerides 10/03/2024 99  0 - 150 mg/dL Final    HDL Cholesterol 10/03/2024 35 (L)  40 - 60 mg/dL Final    LDL Cholesterol  10/03/2024 164 (H)  0 - 100 mg/dL Final    VLDL Cholesterol 10/03/2024 18  5 - 40 mg/dL Final    LDL/HDL Ratio 10/03/2024 4.63   Final    TSH 10/03/2024 0.606  0.270 - 4.200 uIU/mL Final    Free T4 10/03/2024 1.41  0.92 - 1.68 ng/dL Final   Office Visit on 09/16/2024   Component Date Value Ref Range Status    GARDNERELLA VAGINALIS 09/16/2024 Positive (A)  Negative Final    TRICHOMONAS VAGINALIS 09/16/2024 Negative  Negative Final    KADE SPECIES 09/16/2024 Negative  Negative Final   Admission on 08/09/2024, Discharged on 08/09/2024   Component Date Value Ref Range Status    HCG, Urine QL 08/09/2024 Negative  Negative Final    ABO Type 08/09/2024 A   Final    RH type 08/09/2024 Positive   Final    Antibody Screen 08/09/2024 Negative   Final    T&S Expiration Date 08/09/2024 8/16/2024 11:59:00 PM   Final    WBC 08/09/2024 7.10  3.40 - 10.80 10*3/mm3 Final    RBC 08/09/2024 4.91  3.77 - 5.28 10*6/mm3 Final    Hemoglobin 08/09/2024 14.9  12.0 - 15.9 g/dL Final    Hematocrit 08/09/2024 45.0  34.0 - 46.6 % Final    MCV 08/09/2024 91.6  79.0 - 97.0 fL Final    MCH 08/09/2024 30.3  26.6 - 33.0 pg Final    MCHC 08/09/2024 33.1  31.5 - 35.7 g/dL Final    RDW 08/09/2024 13.6  12.3 - 15.4 % Final    RDW-SD 08/09/2024 45.8  37.0 - 54.0 fl Final    MPV 08/09/2024 9.3  6.0 - 12.0 fL Final    Platelets 08/09/2024 212  140 - 450  10*3/mm3 Final    Neutrophil % 08/09/2024 56.5  42.7 - 76.0 % Final    Lymphocyte % 08/09/2024 33.7  19.6 - 45.3 % Final    Monocyte % 08/09/2024 5.9  5.0 - 12.0 % Final    Eosinophil % 08/09/2024 3.0  0.3 - 6.2 % Final    Basophil % 08/09/2024 0.6  0.0 - 1.5 % Final    Immature Grans % 08/09/2024 0.3  0.0 - 0.5 % Final    Neutrophils, Absolute 08/09/2024 4.02  1.70 - 7.00 10*3/mm3 Final    Lymphocytes, Absolute 08/09/2024 2.39  0.70 - 3.10 10*3/mm3 Final    Monocytes, Absolute 08/09/2024 0.42  0.10 - 0.90 10*3/mm3 Final    Eosinophils, Absolute 08/09/2024 0.21  0.00 - 0.40 10*3/mm3 Final    Basophils, Absolute 08/09/2024 0.04  0.00 - 0.20 10*3/mm3 Final    Immature Grans, Absolute 08/09/2024 0.02  0.00 - 0.05 10*3/mm3 Final    nRBC 08/09/2024 0.0  0.0 - 0.2 /100 WBC Final    ABO Type 08/09/2024 A   Final    RH type 08/09/2024 Positive   Final    Case Report 08/09/2024    Final                    Value:Surgical Pathology Report                         Case: [id number]                                  Authorizing Provider:  Afshan Magana DO         Collected:           08/09/2024 12:19 PM          Ordering Location:     [facility] MAIN Received:            08/12/2024 09:46 AM                                 OR                                                                           Pathologist:           Jr Merritt MD                                                            Specimen:    Uterus, Cervix, Bilateral Fallopian Tubes , UTERUS, CERVIX, BILATERAL FALLOPIAN                     TUBES, LEFT OVARY                                                                          Clinical Information 08/09/2024    Final                    Value:Abnormal uterine bleeding (AUB)                          Uterine leiomyoma, unspecified location    Final Diagnosis 08/09/2024    Final                    Value:Uterus, cervix, bilateral fallopian tubes and ovaries, hysterectomy with                            "bilateral salpingo-oophorectomy:                           - Cervix:  No significant pathologic change                           - Endometrium:  Secretory endometrium                           - Myometrium:  Multiple leiomyomas                           - Serosa:  No significant pathologic change                           - Left ovary:  Hemorrhagic cyst with dystrophic ossification, see comment                           - Bilateral fallopian tubes:  No significant pathologic change                              Comment 08/09/2024    Final                    Value:Sections from the left ovary show dense fibrous tissue, blood, and                           hemosiderin laden macrophages, consistent with a hemorrhagic cyst.  No                           epithelial lining or endometrial stroma is present, therefore, the cyst                           cannot be further classified.    Gross Description 08/09/2024    Final                    Value:1. Uterus, Cervix, Bilateral Fallopian Tubes.                          Received fresh and subsequently placed in formalin, labeled \"uterus,                           cervix, bilateral fallopian tubes, left ovary\" is a 637 g, 18.0 x 13.0 x                           7.0 cm aggregate of morcellated hysterectomy fragments including detached,                           bilateral, fimbriated fallopian tubes, 3.0 x 2.5 cm unoriented cervix, and                           a disrupted left ovary.  The cervix displays tan, smooth serosa with a 1.0                           x 0.5 cm cervical os.  The disrupted fibroids ranging in size from 1.5-7.0                           cm and each display a white, whorled cut surface.  An endometrial cavity                           is not grossly appreciated.  The 5.5 cm in length by 0.7 cm average                           diameter fallopian tubes display shaggy serosa and a pinpoint lumen upon                           sectioning.  The 10 g, 3.5 x 2.5 x " 2.5 cm disrupted left ovary is                           diffusely calcified, and sectioning reveals a calcified cut surface and                           hemorrhagic material.  Representative sections are submitted as follows:                           1A-anterior/posterior cervix; 1B-possible endometrial cavity;                           0V-7T-qqontsvrss fallopian tubes, fimbria entirely; 8P-5L-ccofutre of                           fibroids; 1G-calcified ovary, following decalcification.  1H-additional                           sections of left ovary, following decalcification.  8/15/2024                           ASIF    Microscopic Description 08/09/2024    Final                    Value:Microscopic examination performed.   Lab on 05/21/2024   Component Date Value Ref Range Status    Ferritin 05/21/2024 49.40  13.00 - 150.00 ng/mL Final    Iron 05/21/2024 68  37 - 145 mcg/dL Final   Office Visit on 04/08/2024   Component Date Value Ref Range Status    Glucose 04/08/2024 91  65 - 99 mg/dL Final    BUN 04/08/2024 11  6 - 20 mg/dL Final    Creatinine 04/08/2024 0.80  0.57 - 1.00 mg/dL Final    Sodium 04/08/2024 142  136 - 145 mmol/L Final    Potassium 04/08/2024 4.5  3.5 - 5.2 mmol/L Final    Chloride 04/08/2024 106  98 - 107 mmol/L Final    CO2 04/08/2024 28.0  22.0 - 29.0 mmol/L Final    Calcium 04/08/2024 9.8  8.6 - 10.5 mg/dL Final    Total Protein 04/08/2024 6.9  6.0 - 8.5 g/dL Final    Albumin 04/08/2024 4.7  3.5 - 5.2 g/dL Final    ALT (SGPT) 04/08/2024 19  1 - 33 U/L Final    AST (SGOT) 04/08/2024 14  1 - 32 U/L Final    Alkaline Phosphatase 04/08/2024 81  39 - 117 U/L Final    Total Bilirubin 04/08/2024 0.3  0.0 - 1.2 mg/dL Final    Globulin 04/08/2024 2.2  gm/dL Final    A/G Ratio 04/08/2024 2.1  g/dL Final    BUN/Creatinine Ratio 04/08/2024 13.8  7.0 - 25.0 Final    Anion Gap 04/08/2024 8.0  5.0 - 15.0 mmol/L Final    eGFR 04/08/2024 95.1  >60.0 mL/min/1.73 Final    Total Cholesterol 04/08/2024 192  0 -  200 mg/dL Final    Triglycerides 04/08/2024 80  0 - 150 mg/dL Final    HDL Cholesterol 04/08/2024 41  40 - 60 mg/dL Final    LDL Cholesterol  04/08/2024 136 (H)  0 - 100 mg/dL Final    VLDL Cholesterol 04/08/2024 15  5 - 40 mg/dL Final    LDL/HDL Ratio 04/08/2024 3.29   Final    Hepatitis C Ab 04/08/2024 Non-Reactive  Non-Reactive Final    TSH 04/08/2024 2.430  0.270 - 4.200 uIU/mL Final    Free T4 04/08/2024 1.11  0.93 - 1.70 ng/dL Final    WBC 04/08/2024 6.43  3.40 - 10.80 10*3/mm3 Final    RBC 04/08/2024 4.77  3.77 - 5.28 10*6/mm3 Final    Hemoglobin 04/08/2024 14.3  12.0 - 15.9 g/dL Final    Hematocrit 04/08/2024 42.6  34.0 - 46.6 % Final    MCV 04/08/2024 89.3  79.0 - 97.0 fL Final    MCH 04/08/2024 30.0  26.6 - 33.0 pg Final    MCHC 04/08/2024 33.6  31.5 - 35.7 g/dL Final    RDW 04/08/2024 12.7  12.3 - 15.4 % Final    RDW-SD 04/08/2024 41.5  37.0 - 54.0 fl Final    MPV 04/08/2024 10.0  6.0 - 12.0 fL Final    Platelets 04/08/2024 205  140 - 450 10*3/mm3 Final    Neutrophil % 04/08/2024 53.5  42.7 - 76.0 % Final    Lymphocyte % 04/08/2024 32.8  19.6 - 45.3 % Final    Monocyte % 04/08/2024 7.8  5.0 - 12.0 % Final    Eosinophil % 04/08/2024 5.1  0.3 - 6.2 % Final    Basophil % 04/08/2024 0.6  0.0 - 1.5 % Final    Immature Grans % 04/08/2024 0.2  0.0 - 0.5 % Final    Neutrophils, Absolute 04/08/2024 3.44  1.70 - 7.00 10*3/mm3 Final    Lymphocytes, Absolute 04/08/2024 2.11  0.70 - 3.10 10*3/mm3 Final    Monocytes, Absolute 04/08/2024 0.50  0.10 - 0.90 10*3/mm3 Final    Eosinophils, Absolute 04/08/2024 0.33  0.00 - 0.40 10*3/mm3 Final    Basophils, Absolute 04/08/2024 0.04  0.00 - 0.20 10*3/mm3 Final    Immature Grans, Absolute 04/08/2024 0.01  0.00 - 0.05 10*3/mm3 Final    nRBC 04/08/2024 0.0  0.0 - 0.2 /100 WBC Final   There may be more visits with results that are not included.       EKG Results:  No orders to display       Imaging Results:  CT Head Without Contrast    Result Date: 1/16/2025  Impression: 1.No  acute intracranial process identified. 2.Mild mucosal disease within left sphenoid sinus. Electronically Signed: Travis Horan MD  1/16/2025 7:00 PM EST  Workstation ID: [id number]    XR Chest PA & Lateral (In Office)    Result Date: 1/7/2025  Impression: No acute process. Electronically Signed: Rito Lambert MD  1/7/2025 3:10 PM EST  Workstation ID: [id number]        Assessment & Plan   Diagnoses and all orders for this visit:    1. Generalized anxiety disorder (Primary)  -     citalopram (CeleXA) 20 MG tablet; Take 1 tablet by mouth Every Morning.  Dispense: 90 tablet; Refill: 0    2. Recurrent major depressive disorder, in partial remission  -     citalopram (CeleXA) 20 MG tablet; Take 1 tablet by mouth Every Morning.  Dispense: 90 tablet; Refill: 0    3. Insomnia due to other mental disorder  -     traZODone (DESYREL) 100 MG tablet; Take 2 tablets by mouth At Night As Needed for Sleep.  Dispense: 180 tablet; Refill: 0    4. Alcohol dependence in remission          Patient screened positive for depression based on a PHQ-9 score of 9 on 3/6/2025. Follow-up recommendations include: Prescribed antidepressant medication treatment, Referral to Mental Health specialist, Suicide Risk Assessment performed, and see assessment below .    Dx: MDD, HELENE, insomnia, alcohol dependence.  We will continue Celexa for management depression, anxiety, and overall mood.  We will discontinue Seroquel and naltrexone.  We will take over prescription for trazodone increased to 200 mg for sleep as needed, counseled the patient on the need to take medications as prescribed.  Reiterated need for psychotherapy.  Instructed patient to contact the office for any new or worsening symptoms or any other concerns.  Follow-up in 1 month per patient request.  Addressed all questions and concerns.    I have reviewed the assessment and plan and verified the accuracy of it. No changes to assessment and plan since the information was documented. Martha  NYA Harvey 04/03/25     Visit Diagnoses:    ICD-10-CM ICD-9-CM   1. Generalized anxiety disorder  F41.1 300.02   2. Recurrent major depressive disorder, in partial remission  F33.41 296.35   3. Insomnia due to other mental disorder  F51.05 300.9    F99 327.02   4. Alcohol dependence in remission  F10.21 303.93           PLAN:  Safety: Discussed concerns about access to firearms and the need to have a trusted friend or family member hold onto her firearm until mood improves.  Patient states she will give her gun to her father.  Therapy: We will refer for psychotherapy  Risk Assessment: Risk of self-harm acutely is moderate to severe.  Risk factors include anxiety disorder, mood disorder, access to firearms, and recent psychosocial stressors (pandemic). Protective factors include no family history, no present SI, no history of suicide attempts or self-harm in the past, minimal AODA, healthcare seeking, future orientation, willingness to engage in care.  Risk of self-harm chronically is also moderate to severe, but could be further elevated in the event of treatment noncompliance and/or AODA.  Labs/Diagnostics Ordered:   No orders of the defined types were placed in this encounter.    Medications:   New Medications Ordered This Visit   Medications    citalopram (CeleXA) 20 MG tablet     Sig: Take 1 tablet by mouth Every Morning.     Dispense:  90 tablet     Refill:  0    traZODone (DESYREL) 100 MG tablet     Sig: Take 2 tablets by mouth At Night As Needed for Sleep.     Dispense:  180 tablet     Refill:  0       Discussed all risks, benefits, alternatives, and side effects of citalopram including but not limited to GI upset, sexual dysfunction, bleeding risk, seizure risk, insomnia, sedation, headache, activation of darnell or hypomania, increased fragility fracture risk, hyponatremia, ocular effects, dose-dependent prolonged QT interval, withdrawal syndrome following abrupt discontinuation, serotonin syndrome, and  activation of suicidal ideation and behavior.  Pt educated on the need to practice safe sex while taking this med. Discussed the need for pt to immediately call the office for any new or worsening symptoms, such as worsening depression; feeling nervous or restless; suicidal thoughts or actions; or other changes changes in mood or behavior, and all other concerns. Pt educated on med compliance and the risks of suddenly stopping this medication or missing doses. Pt verbalized understanding and is agreeable to taking citalopram. Addressed all questions and concerns.     Discussed all risks, benefits, alternatives, and side effects of Trazodone including but not limited to GI upset, sexual dysfunction, dizziness, headache, nervousness, bleeding risk, seizure risk, sedation, headache, activation of darnell or hypomania, increased fragility fracture risk, cardiac arrhythmias, priapism, hyponatremia, ocular effects, prolonged QT interval, withdrawal syndrome following abrupt discontinuation, serotonin syndrome, and activation of suicidal ideation and behavior.  Pt educated on the need to practice safe sex while taking this med. Discussed the need for pt to immediately call the office for any new or worsening symptoms, such as worsening depression; feeling nervous or restless; suicidal thoughts or actions; or other changes changes in mood or behavior, and all other concerns. Pt educated on med compliance and the risks of suddenly stopping this medication or missing doses. Pt verbalized understanding and is agreeable to taking Trazodone. Addressed all questions and concerns.     Follow up: F/u in 1 month.    TREATMENT PLAN/GOALS: Continue supportive psychotherapy efforts and medications as indicated. Treatment and medication options discussed during today's visit. Patient ackowledged and verbally consented to continue with current treatment plan and was educated on the importance of compliance with treatment and follow-up  appointments.    MEDICATION ISSUES:  GAUTAM reviewed as expected.  Discussed medication options and treatment plan of prescribed medication as well as the risks, benefits, and side effects including potential falls, possible impaired driving and metabolic adversities among others. Patient is agreeable to call the office with any worsening of symptoms or onset of side effects. Patient is agreeable to call 911 or go to the nearest ER should he/she begin having SI/HI. No medication side effects or related complaints today.            This document has been electronically signed by Martha Harvey PA-C  April 3, 2025 09:39 EDT      Part of this note may be an electronic transcription/translation of spoken language to printed text using the Dragon Dictation System.

## 2025-04-16 DIAGNOSIS — G47.00 INSOMNIA, UNSPECIFIED TYPE: ICD-10-CM

## 2025-04-16 DIAGNOSIS — F33.1 MODERATE EPISODE OF RECURRENT MAJOR DEPRESSIVE DISORDER: ICD-10-CM

## 2025-04-17 RX ORDER — QUETIAPINE FUMARATE 25 MG/1
TABLET, FILM COATED ORAL
Qty: 60 TABLET | Refills: 0 | OUTPATIENT
Start: 2025-04-17

## 2025-04-22 ENCOUNTER — TELEPHONE (OUTPATIENT)
Dept: PSYCHIATRY | Facility: CLINIC | Age: 45
End: 2025-04-22
Payer: COMMERCIAL

## 2025-04-22 NOTE — TELEPHONE ENCOUNTER
PT RETURNED CALL.     REFERRAL SPECIALISTS ALREADY OOO.    PT DID REPORT THAT SHE GOES TO WORK AT 12 AND CAN BE REACHED AGAIN BEFORE 12 THE NEXT BUSINESS DAY.    I WILL ALSO INFORM RE[facility]ERRAL SPECIALISTS OF THIS IN PERSON THE NEXT BUSINESS DAY.

## 2025-04-22 NOTE — TELEPHONE ENCOUNTER
PT LVM.    PT RETURNING CALL SHE SAID SHE RECEIVED FROM US.    I DON'T SEE WHERE OUR OFFICE HAS REACHED OUT TO PT AT THIS TIME.    I DO HOWEVER SEE THAT PT HAS A REFERRAL IN FROM PROVIDER.  Referral to Psychiatry for Severe episode of recurrent major depressive disorder, without psychotic features; Generalized anxiety disorder (03/06/2025)

## 2025-05-13 ENCOUNTER — OFFICE VISIT (OUTPATIENT)
Dept: BEHAVIORAL HEALTH | Facility: CLINIC | Age: 45
End: 2025-05-13
Payer: COMMERCIAL

## 2025-05-13 VITALS
WEIGHT: 158.9 LBS | BODY MASS INDEX: 28.16 KG/M2 | DIASTOLIC BLOOD PRESSURE: 64 MMHG | HEIGHT: 63 IN | HEART RATE: 71 BPM | SYSTOLIC BLOOD PRESSURE: 116 MMHG

## 2025-05-13 DIAGNOSIS — F33.41 RECURRENT MAJOR DEPRESSIVE DISORDER, IN PARTIAL REMISSION: ICD-10-CM

## 2025-05-13 DIAGNOSIS — F99 INSOMNIA DUE TO OTHER MENTAL DISORDER: ICD-10-CM

## 2025-05-13 DIAGNOSIS — F10.21 ALCOHOL DEPENDENCE IN REMISSION: ICD-10-CM

## 2025-05-13 DIAGNOSIS — F41.1 GENERALIZED ANXIETY DISORDER: Primary | ICD-10-CM

## 2025-05-13 DIAGNOSIS — F51.05 INSOMNIA DUE TO OTHER MENTAL DISORDER: ICD-10-CM

## 2025-05-13 RX ORDER — TRAZODONE HYDROCHLORIDE 100 MG/1
200 TABLET ORAL NIGHTLY PRN
Qty: 180 TABLET | Refills: 0 | Status: SHIPPED | OUTPATIENT
Start: 2025-05-13

## 2025-05-13 RX ORDER — IBUPROFEN 600 MG/1
TABLET ORAL
COMMUNITY
Start: 2025-04-25

## 2025-05-13 RX ORDER — CITALOPRAM HYDROBROMIDE 20 MG/1
20 TABLET ORAL EVERY MORNING
Qty: 90 TABLET | Refills: 0 | Status: SHIPPED | OUTPATIENT
Start: 2025-05-13

## 2025-05-13 RX ORDER — AMOXICILLIN 500 MG/1
CAPSULE ORAL
COMMUNITY
Start: 2025-04-25

## 2025-05-13 NOTE — PROGRESS NOTES
Chief Complaint:  Depression     History of Present Illness: Frances Rao is a 45 y.o. female who presents to the office today for f/u of mood. Patient is taking medications as prescribed and tolerating well without any complications. Pt denies feeling depressed. No anhedonia or hopelessness. Pt has been going to the gym. Pt denies having any current SI or HI at this time. No SI since last visit. No difficulty sleeping with taking trazodone 200mg. No excessive worrying. Pt notes having some anxiety that is situational and manageable. Pt will have some irritability at work, but is situational and manageable. Pt denies alcohol cravings. Pt denies binge drinking since last visit. Pt is not concerned about alcohol use since last visit.     I have reviewed/confirmed previously documented HPI with no changes.     Answers submitted by the patient for this visit:  Problem not listed (Submitted on 5/6/2025)  Chief Complaint: Other medical problem  Reason for appointment: Sleep  Onset: more than 5 years  Chronicity: recurrent  Frequency: weekly      Medical Record Review: Reviewed office visit note from 11/18/2024, history of orthostatic hypotension, has increased water to 1 bottle per day.  Patient complains of insomnia and does not think trazodone is working.  Patient has anxiety she has increased since restarting work.  She midst of feeling down and sad.  Patient is going through relationship issues.  Patient denies SI and HI.  History of RLS, Anxiety, depression, migraine      PHQ-9 Depression Screening  Little interest or pleasure in doing things?     Feeling down, depressed, or hopeless?     PHQ-2 Total Score     Trouble falling or staying asleep, or sleeping too much?     Feeling tired or having little energy?     Poor appetite or overeating?     Feeling bad about yourself - or that you are a failure or have let yourself or your family down?     Trouble concentrating on things, such as reading the newspaper or  watching television?     Moving or speaking so slowly that other people could have noticed? Or the opposite - being so fidgety or restless that you have been moving around a lot more than usual?       Thoughts that you would be better off dead, or of hurting yourself in some way?     PHQ-9 Total Score     If you checked off any problems, how difficult have these problems made it for you to do your work, take care of things at home, or get along with other people?                 HELENE-7           ROS:  Review of Systems   Constitutional:  Negative for appetite change, chills, diaphoresis, fatigue, fever and unexpected weight change.   HENT:  Positive for congestion, sore throat and tinnitus. Negative for drooling.    Eyes:  Negative for visual disturbance.   Respiratory:  Positive for chest tightness and shortness of breath. Negative for cough.    Cardiovascular:  Positive for chest pain. Negative for palpitations.   Gastrointestinal:  Positive for abdominal pain, diarrhea and nausea. Negative for constipation and vomiting.   Endocrine: Positive for heat intolerance. Negative for cold intolerance.   Genitourinary:  Negative for difficulty urinating and dysuria.   Musculoskeletal:  Positive for arthralgias, back pain and myalgias. Negative for neck pain.   Skin:  Negative for rash.   Neurological:  Positive for dizziness and headaches. Negative for syncope, weakness and numbness.   Psychiatric/Behavioral:  Positive for agitation. Negative for confusion, dysphoric mood, self-injury, sleep disturbance and suicidal ideas. The patient is nervous/anxious.        Problem List:  Patient Active Problem List   Diagnosis    Cigarette nicotine dependence without complication    Thickened endometrium    Hot flashes    Restless legs syndrome (RLS)    Insomnia due to medical condition    Abnormal uterine bleeding (AUB)    Uterine leiomyoma    Orthostatic hypertension    Syncope    Rectal bleed    Generalized anxiety disorder     Lightheadedness       Current Medications:   Current Outpatient Medications   Medication Sig Dispense Refill    Airsupra 90-80 MCG/ACT aerosol       albuterol sulfate  (90 Base) MCG/ACT inhaler Inhale 2 puffs Every 4 (Four) Hours As Needed for Wheezing. 8 g 2    amoxicillin (AMOXIL) 500 MG capsule       cetirizine (zyrTEC) 10 MG tablet Take 1 tablet by mouth Daily. 30 tablet 1    citalopram (CeleXA) 20 MG tablet Take 1 tablet by mouth Every Morning. 90 tablet 0    cloNIDine (CATAPRES) 0.1 MG tablet Take 1 tablet by mouth Every Night. 90 tablet 1    fluticasone (FLONASE) 50 MCG/ACT nasal spray Administer 2 sprays into the nostril(s) as directed by provider Daily. 11.1 g 1    hydrOXYzine (ATARAX) 25 MG tablet        MG tablet       meclizine (ANTIVERT) 12.5 MG tablet Take 1 tablet by mouth 3 (Three) Times a Day As Needed for Dizziness. 30 tablet 0    pramipexole (MIRAPEX) 0.25 MG tablet       traZODone (DESYREL) 100 MG tablet Take 2 tablets by mouth At Night As Needed for Sleep. 180 tablet 0     No current facility-administered medications for this visit.       Discontinued Medications:  Medications Discontinued During This Encounter   Medication Reason    citalopram (CeleXA) 20 MG tablet Reorder    traZODone (DESYREL) 100 MG tablet Reorder           Allergy:   No Known Allergies     Past Medical History:  Past Medical History:   Diagnosis Date    Abnormal Pap smear of cervix     Abnormal uterine bleeding (AUB)     Anxiety     Depression 2012    Fibroid     History of sleep walking     Insomnia     Migraine     Orthostatic hypertension 10/21/2024    Ovarian cyst        Past Surgical History:  Past Surgical History:   Procedure Laterality Date    COLPOSCOPY      D & C HYSTEROSCOPY N/A 02/01/2024    Procedure: DILATATION AND CURETTAGE HYSTEROSCOPY;  Surgeon: Afshan Magana DO;  Location: [facility] MAIN OR;  Service: Gynecology;  Laterality: N/A;    HYSTERECTOMY      TOTAL LAPAROSCOPIC HYSTERECTOMY N/A  08/09/2024    Procedure: ROBOTIC ASSISTED LAPAROSCOPIC HYSTERECTOMY WITH BILATERAL SALPINGECTOMY, CYTSOCOPY;  Surgeon: Afshan Magana DO;  Location: [facility] MAIN OR;  [facility];  Laterality: N/A;    WISDOM TOOTH EXTRACTION         Past Psychiatric History:  Began Treatment: 2 years ago   Diagnoses: Anxiety  Psychiatrist: Denies  Therapist: Denies  Admission History: Denies  Medications/Treatment: Celexa, Clonidine, Trazodone, Hydroxyzine, effexor, Seroquel  Self Harm: Denies  Suicide Attempts: Denies  Postpartum depression: Denies    Family Psychiatric History:   Diagnoses: Her half brother has a h/o bipolar.   Substance use: Her pat grandfather and mat grandfather had a h/o EtOH abuse.   Suicide Attempts/Completions: Denies    Family History   Problem Relation Age of Onset    COPD Mother     Restless legs syndrome Mother     Anxiety disorder Mother     Arthritis Mother     COPD Father     Insomnia Father     Arthritis Father     Breast cancer Paternal Great-Grandmother     Breast cancer Maternal Great-Grandmother     Breast cancer Maternal Grandmother     Breast cancer Paternal Grandmother     Uterine cancer Neg Hx     Ovarian cancer Neg Hx     Colon cancer Neg Hx     Melanoma Neg Hx     Prostate cancer Neg Hx        Substance Abuse History:   Alcohol use: Pt will a few shots of whiskey, has been nightly for the past few weeks.   Nicotine: Pt smokes 0.5PPD.   Illicit Drug Use: Pt smokes marijuana.   Longest Period Sober: 2 years from EtOH.   Rehab/AA/NA: Denies    Social History:  Living Situation: Pt lives with her father.   Marital/Relationship History: Single  Children: Denies  Work History/Occupation: Pt works as [employer].   Education: Pt completed high school, no college.    History: Denies  Legal: Denies    Social History     Socioeconomic History    Marital status: Single   Tobacco Use    Smoking status: Every Day     Current packs/day: 0.50     Average packs/day: 0.5  "packs/day for 50.8 years (25.4 ttl pk-yrs)     Types: Cigarettes     Start date: 8/12/1996    Smokeless tobacco: Never    Tobacco comments:     Last 1/31/24   Vaping Use    Vaping status: Former    Substances: Nicotine, Flavoring    Devices: Disposable   Substance and Sexual Activity    Alcohol use: Yes    Drug use: Yes     Types: Marijuana    Sexual activity: Not Currently     Partners: Male     Birth control/protection: None, Hysterectomy       Developmental History:   Place of birth: [facility]  Siblings: 1 half brother  Childhood: Pt was raised by her father since 18 months old, would see her mother on the weekend. No abuse, trauma, or neglect.       Physical Exam:  Physical Exam    Appearance: Well-groomed with adequate hygiene, appears to be of stated age. Casually and neatly dressed, maintains good eye contact.   Behavior: Appropriate, cooperative. No acute distress.  Motor: No abnormal movements, tics or tremors are noted. No psychomotor agitation or retardation.  Speech: Coherent, spontaneous, appropriate with normal rate, volume, rhythm, and tone. Normal reaction time to questions.  No hyperverbal or pressured speech  Mood: \"I'm good\"  Affect: Full range, appropriate, congruent with spontaneous emotional reactivity. Normal intensity. No emotional blunting.   Thought content: Negative suicidal ideations, negative homicidal ideations. Patient denies any obsession, compulsion, or phobia. No evidence of delusions.  Perceptions: Negative auditory hallucinations, negative visual hallucinations. Pt does not appear to be actively responding to internal stimuli.   Thought process: Logical, goal-directed, coherent, and linear with no evidence of flight of ideas, looseness of associations, thought blocking, or tangentiality.    Insight/Judgement: Fair/fair  Cognition: Alert and oriented to person, place, and date. Memory intact for recent and remote events. Attention and concentration intact.     I have " "reexamined the patient and the results are consistent with the previously documented exam. Martha Harvey PA-C         Vital Signs:   /64   Pulse 71   Ht 160 cm (62.99\")   Wt 72.1 kg (158 lb 14.4 oz)   BMI 28.16 kg/m²      Lab Results:   Hospital Outpatient Visit on 02/18/2025   Component Date Value Ref Range Status    Heart rate (average) 02/18/2025 71   Final    Heart rate minimum 02/18/2025 35   Final    Heart rate maximum 02/18/2025 125   Final   Office Visit on 01/20/2025   Component Date Value Ref Range Status    Glucose 01/20/2025 87  65 - 99 mg/dL Final    BUN 01/20/2025 16  6 - 20 mg/dL Final    Creatinine 01/20/2025 0.82  0.57 - 1.00 mg/dL Final    Sodium 01/20/2025 140  136 - 145 mmol/L Final    Potassium 01/20/2025 4.2  3.5 - 5.2 mmol/L Final    Chloride 01/20/2025 104  98 - 107 mmol/L Final    CO2 01/20/2025 25.0  22.0 - 29.0 mmol/L Final    Calcium 01/20/2025 9.8  8.6 - 10.5 mg/dL Final    Total Protein 01/20/2025 6.7  6.0 - 8.5 g/dL Final    Albumin 01/20/2025 4.3  3.5 - 5.2 g/dL Final    ALT (SGPT) 01/20/2025 20  1 - 33 U/L Final    AST (SGOT) 01/20/2025 16  1 - 32 U/L Final    Alkaline Phosphatase 01/20/2025 70  39 - 117 U/L Final    Total Bilirubin 01/20/2025 0.3  0.0 - 1.2 mg/dL Final    Globulin 01/20/2025 2.4  gm/dL Final    A/G Ratio 01/20/2025 1.8  g/dL Final    BUN/Creatinine Ratio 01/20/2025 19.5  7.0 - 25.0 Final    Anion Gap 01/20/2025 11.0  5.0 - 15.0 mmol/L Final    eGFR 01/20/2025 91.7  >60.0 mL/min/1.73 Final    TSH 01/20/2025 1.160  0.270 - 4.200 uIU/mL Final    Total Cholesterol 01/20/2025 184  0 - 200 mg/dL Final    Triglycerides 01/20/2025 84  0 - 150 mg/dL Final    HDL Cholesterol 01/20/2025 37 (L)  40 - 60 mg/dL Final    LDL Cholesterol  01/20/2025 131 (H)  0 - 100 mg/dL Final    VLDL Cholesterol 01/20/2025 16  5 - 40 mg/dL Final    LDL/HDL Ratio 01/20/2025 3.52   Final    Ferritin 01/20/2025 81.70  13.00 - 150.00 ng/mL Final    Iron 01/20/2025 75  37 - 145 mcg/dL " Final    Iron Saturation (TSAT) 01/20/2025 16 (L)  20 - 50 % Final    Transferrin 01/20/2025 310  200 - 360 mg/dL Final    TIBC 01/20/2025 462  298 - 536 mcg/dL Final    WBC 01/20/2025 6.21  3.40 - 10.80 10*3/mm3 Final    RBC 01/20/2025 4.88  3.77 - 5.28 10*6/mm3 Final    Hemoglobin 01/20/2025 15.3  12.0 - 15.9 g/dL Final    Hematocrit 01/20/2025 44.7  34.0 - 46.6 % Final    MCV 01/20/2025 91.6  79.0 - 97.0 fL Final    MCH 01/20/2025 31.4  26.6 - 33.0 pg Final    MCHC 01/20/2025 34.2  31.5 - 35.7 g/dL Final    RDW 01/20/2025 14.8  12.3 - 15.4 % Final    RDW-SD 01/20/2025 49.2  37.0 - 54.0 fl Final    MPV 01/20/2025 9.9  6.0 - 12.0 fL Final    Platelets 01/20/2025 216  140 - 450 10*3/mm3 Final    Neutrophil % 01/20/2025 60.9  42.7 - 76.0 % Final    Lymphocyte % 01/20/2025 29.6  19.6 - 45.3 % Final    Monocyte % 01/20/2025 6.3  5.0 - 12.0 % Final    Eosinophil % 01/20/2025 2.4  0.3 - 6.2 % Final    Basophil % 01/20/2025 0.5  0.0 - 1.5 % Final    Immature Grans % 01/20/2025 0.3  0.0 - 0.5 % Final    Neutrophils, Absolute 01/20/2025 3.78  1.70 - 7.00 10*3/mm3 Final    Lymphocytes, Absolute 01/20/2025 1.84  0.70 - 3.10 10*3/mm3 Final    Monocytes, Absolute 01/20/2025 0.39  0.10 - 0.90 10*3/mm3 Final    Eosinophils, Absolute 01/20/2025 0.15  0.00 - 0.40 10*3/mm3 Final    Basophils, Absolute 01/20/2025 0.03  0.00 - 0.20 10*3/mm3 Final    Immature Grans, Absolute 01/20/2025 0.02  0.00 - 0.05 10*3/mm3 Final    nRBC 01/20/2025 0.0  0.0 - 0.2 /100 WBC Final   Clinical Support on 01/14/2025   Component Date Value Ref Range Status    TB Skin Test 01/16/2025 Negative   Final    Induration 01/16/2025 0  0 - 10 mm Final   Admission on 01/01/2025, Discharged on 01/01/2025   Component Date Value Ref Range Status    Rapid Strep A Screen 01/01/2025 Negative   Final    Internal Control 01/01/2025 Passed   Final    Lot Number 01/01/2025 [id number]   Final    Expiration Date 01/01/2025 12,112,026   Final    SARS Antigen 01/01/2025 Not  Detected  Not Detected, Presumptive Negative Final    Influenza A Antigen HALEY 01/01/2025 Not Detected  Not Detected Final    Influenza B Antigen HALEY 01/01/2025 Not Detected  Not Detected Final    Internal Control 01/01/2025 Passed  Passed Final    Lot Number 01/01/2025 [id number]   Final    Expiration Date 01/01/2025 10,174,257   Final   Office Visit on 10/21/2024   Component Date Value Ref Range Status    Glucose 10/21/2024 106 (H)  65 - 99 mg/dL Final    BUN 10/21/2024 12  6 - 20 mg/dL Final    Creatinine 10/21/2024 0.96  0.57 - 1.00 mg/dL Final    Sodium 10/21/2024 140  136 - 145 mmol/L Final    Potassium 10/21/2024 4.1  3.5 - 5.2 mmol/L Final    Chloride 10/21/2024 104  98 - 107 mmol/L Final    CO2 10/21/2024 24.6  22.0 - 29.0 mmol/L Final    Calcium 10/21/2024 10.3  8.6 - 10.5 mg/dL Final    Total Protein 10/21/2024 7.4  6.0 - 8.5 g/dL Final    Albumin 10/21/2024 4.5  3.5 - 5.2 g/dL Final    ALT (SGPT) 10/21/2024 18  1 - 33 U/L Final    AST (SGOT) 10/21/2024 14  1 - 32 U/L Final    Alkaline Phosphatase 10/21/2024 77  39 - 117 U/L Final    Total Bilirubin 10/21/2024 0.3  0.0 - 1.2 mg/dL Final    Globulin 10/21/2024 2.9  gm/dL Final    A/G Ratio 10/21/2024 1.6  g/dL Final    BUN/Creatinine Ratio 10/21/2024 12.5  7.0 - 25.0 Final    Anion Gap 10/21/2024 11.4  5.0 - 15.0 mmol/L Final    eGFR 10/21/2024 75.9  >60.0 mL/min/1.73 Final    Ferritin 10/21/2024 37.10  13.00 - 150.00 ng/mL Final    Iron 10/21/2024 48  37 - 145 mcg/dL Final    Iron Saturation (TSAT) 10/21/2024 11 (L)  20 - 50 % Final    Transferrin 10/21/2024 300  200 - 360 mg/dL Final    TIBC 10/21/2024 447  298 - 536 mcg/dL Final    WBC 10/21/2024 7.19  3.40 - 10.80 10*3/mm3 Final    RBC 10/21/2024 5.03  3.77 - 5.28 10*6/mm3 Final    Hemoglobin 10/21/2024 14.8  12.0 - 15.9 g/dL Final    Hematocrit 10/21/2024 45.1  34.0 - 46.6 % Final    MCV 10/21/2024 89.7  79.0 - 97.0 fL Final    MCH 10/21/2024 29.4  26.6 - 33.0 pg Final    MCHC 10/21/2024 32.8  31.5 -  35.7 g/dL Final    RDW 10/21/2024 13.4  12.3 - 15.4 % Final    RDW-SD 10/21/2024 43.8  37.0 - 54.0 fl Final    MPV 10/21/2024 10.3  6.0 - 12.0 fL Final    Platelets 10/21/2024 235  140 - 450 10*3/mm3 Final    Neutrophil % 10/21/2024 63.0  42.7 - 76.0 % Final    Lymphocyte % 10/21/2024 27.7  19.6 - 45.3 % Final    Monocyte % 10/21/2024 5.4  5.0 - 12.0 % Final    Eosinophil % 10/21/2024 3.2  0.3 - 6.2 % Final    Basophil % 10/21/2024 0.6  0.0 - 1.5 % Final    Immature Grans % 10/21/2024 0.1  0.0 - 0.5 % Final    Neutrophils, Absolute 10/21/2024 4.53  1.70 - 7.00 10*3/mm3 Final    Lymphocytes, Absolute 10/21/2024 1.99  0.70 - 3.10 10*3/mm3 Final    Monocytes, Absolute 10/21/2024 0.39  0.10 - 0.90 10*3/mm3 Final    Eosinophils, Absolute 10/21/2024 0.23  0.00 - 0.40 10*3/mm3 Final    Basophils, Absolute 10/21/2024 0.04  0.00 - 0.20 10*3/mm3 Final    Immature Grans, Absolute 10/21/2024 0.01  0.00 - 0.05 10*3/mm3 Final    nRBC 10/21/2024 0.0  0.0 - 0.2 /100 WBC Final   Office Visit on 10/03/2024   Component Date Value Ref Range Status    Glucose 10/03/2024 69  65 - 99 mg/dL Final    BUN 10/03/2024 8  6 - 20 mg/dL Final    Creatinine 10/03/2024 1.09 (H)  0.57 - 1.00 mg/dL Final    Sodium 10/03/2024 138  136 - 145 mmol/L Final    Potassium 10/03/2024 3.9  3.5 - 5.2 mmol/L Final    Chloride 10/03/2024 100  98 - 107 mmol/L Final    CO2 10/03/2024 27.0  22.0 - 29.0 mmol/L Final    Calcium 10/03/2024 10.7 (H)  8.6 - 10.5 mg/dL Final    Total Protein 10/03/2024 7.3  6.0 - 8.5 g/dL Final    Albumin 10/03/2024 4.8  3.5 - 5.2 g/dL Final    ALT (SGPT) 10/03/2024 15  1 - 33 U/L Final    AST (SGOT) 10/03/2024 11  1 - 32 U/L Final    Alkaline Phosphatase 10/03/2024 81  39 - 117 U/L Final    Total Bilirubin 10/03/2024 0.4  0.0 - 1.2 mg/dL Final    Globulin 10/03/2024 2.5  gm/dL Final    A/G Ratio 10/03/2024 1.9  g/dL Final    BUN/Creatinine Ratio 10/03/2024 7.3  7.0 - 25.0 Final    Anion Gap 10/03/2024 11.0  5.0 - 15.0 mmol/L Final     eGFR 10/03/2024 65.2  >60.0 mL/min/1.73 Final    Total Cholesterol 10/03/2024 217 (H)  0 - 200 mg/dL Final    Triglycerides 10/03/2024 99  0 - 150 mg/dL Final    HDL Cholesterol 10/03/2024 35 (L)  40 - 60 mg/dL Final    LDL Cholesterol  10/03/2024 164 (H)  0 - 100 mg/dL Final    VLDL Cholesterol 10/03/2024 18  5 - 40 mg/dL Final    LDL/HDL Ratio 10/03/2024 4.63   Final    TSH 10/03/2024 0.606  0.270 - 4.200 uIU/mL Final    Free T4 10/03/2024 1.41  0.92 - 1.68 ng/dL Final   Office Visit on 09/16/2024   Component Date Value Ref Range Status    GARDNERELLA VAGINALIS 09/16/2024 Positive (A)  Negative Final    TRICHOMONAS VAGINALIS 09/16/2024 Negative  Negative Final    KADE SPECIES 09/16/2024 Negative  Negative Final   Admission on 08/09/2024, Discharged on 08/09/2024   Component Date Value Ref Range Status    HCG, Urine QL 08/09/2024 Negative  Negative Final    ABO Type 08/09/2024 A   Final    RH type 08/09/2024 Positive   Final    Antibody Screen 08/09/2024 Negative   Final    T&S Expiration Date 08/09/2024 8/16/2024 11:59:00 PM   Final    WBC 08/09/2024 7.10  3.40 - 10.80 10*3/mm3 Final    RBC 08/09/2024 4.91  3.77 - 5.28 10*6/mm3 Final    Hemoglobin 08/09/2024 14.9  12.0 - 15.9 g/dL Final    Hematocrit 08/09/2024 45.0  34.0 - 46.6 % Final    MCV 08/09/2024 91.6  79.0 - 97.0 fL Final    MCH 08/09/2024 30.3  26.6 - 33.0 pg Final    MCHC 08/09/2024 33.1  31.5 - 35.7 g/dL Final    RDW 08/09/2024 13.6  12.3 - 15.4 % Final    RDW-SD 08/09/2024 45.8  37.0 - 54.0 fl Final    MPV 08/09/2024 9.3  6.0 - 12.0 fL Final    Platelets 08/09/2024 212  140 - 450 10*3/mm3 Final    Neutrophil % 08/09/2024 56.5  42.7 - 76.0 % Final    Lymphocyte % 08/09/2024 33.7  19.6 - 45.3 % Final    Monocyte % 08/09/2024 5.9  5.0 - 12.0 % Final    Eosinophil % 08/09/2024 3.0  0.3 - 6.2 % Final    Basophil % 08/09/2024 0.6  0.0 - 1.5 % Final    Immature Grans % 08/09/2024 0.3  0.0 - 0.5 % Final    Neutrophils, Absolute 08/09/2024 4.02  1.70 - 7.00  10*3/mm3 Final    Lymphocytes, Absolute 08/09/2024 2.39  0.70 - 3.10 10*3/mm3 Final    Monocytes, Absolute 08/09/2024 0.42  0.10 - 0.90 10*3/mm3 Final    Eosinophils, Absolute 08/09/2024 0.21  0.00 - 0.40 10*3/mm3 Final    Basophils, Absolute 08/09/2024 0.04  0.00 - 0.20 10*3/mm3 Final    Immature Grans, Absolute 08/09/2024 0.02  0.00 - 0.05 10*3/mm3 Final    nRBC 08/09/2024 0.0  0.0 - 0.2 /100 WBC Final    ABO Type 08/09/2024 A   Final    RH type 08/09/2024 Positive   Final    Case Report 08/09/2024    Final                    Value:Surgical Pathology Report                         Case: [id number]                                  Authorizing Provider:  Afshan Magana DO         Collected:           08/09/2024 12:19 PM          Ordering Location:     [facility] MAIN Received:            08/12/2024 09:46 AM                                 OR                                                                           Pathologist:           Jr Merritt MD                                                            Specimen:    Uterus, Cervix, Bilateral Fallopian Tubes , UTERUS, CERVIX, BILATERAL FALLOPIAN                     TUBES, LEFT OVARY                                                                          Clinical Information 08/09/2024    Final                    Value:Abnormal uterine bleeding (AUB)                          Uterine leiomyoma, unspecified location    Final Diagnosis 08/09/2024    Final                    Value:Uterus, cervix, bilateral fallopian tubes and ovaries, hysterectomy with                           bilateral salpingo-oophorectomy:                           - Cervix:  No significant pathologic change                           - Endometrium:  Secretory endometrium                           - Myometrium:  Multiple leiomyomas                           - Serosa:  No significant pathologic change                           - Left ovary:  Hemorrhagic cyst with dystrophic  "ossification, see comment                           - Bilateral fallopian tubes:  No significant pathologic change                              Comment 08/09/2024    Final                    Value:Sections from the left ovary show dense fibrous tissue, blood, and                           hemosiderin laden macrophages, consistent with a hemorrhagic cyst.  No                           epithelial lining or endometrial stroma is present, therefore, the cyst                           cannot be further classified.    Gross Description 08/09/2024    Final                    Value:1. Uterus, Cervix, Bilateral Fallopian Tubes.                          Received fresh and subsequently placed in formalin, labeled \"uterus,                           cervix, bilateral fallopian tubes, left ovary\" is a 637 g, 18.0 x 13.0 x                           7.0 cm aggregate of morcellated hysterectomy fragments including detached,                           bilateral, fimbriated fallopian tubes, 3.0 x 2.5 cm unoriented cervix, and                           a disrupted left ovary.  The cervix displays tan, smooth serosa with a 1.0                           x 0.5 cm cervical os.  The disrupted fibroids ranging in size from 1.5-7.0                           cm and each display a white, whorled cut surface.  An endometrial cavity                           is not grossly appreciated.  The 5.5 cm in length by 0.7 cm average                           diameter fallopian tubes display shaggy serosa and a pinpoint lumen upon                           sectioning.  The 10 g, 3.5 x 2.5 x 2.5 cm disrupted left ovary is                           diffusely calcified, and sectioning reveals a calcified cut surface and                           hemorrhagic material.  Representative sections are submitted as follows:                           1A-anterior/posterior cervix; 1B-possible endometrial cavity;                           [id number] fallopian " tubes, fimbria entirely; [id number]-[id number] of                           fibroids; 1G-calcified ovary, following decalcification.  1H-additional                           sections of left ovary, following decalcification.  8/15/2024                           ASIF    Microscopic Description 08/09/2024    Final                    Value:Microscopic examination performed.   Lab on 05/21/2024   Component Date Value Ref Range Status    Ferritin 05/21/2024 49.40  13.00 - 150.00 ng/mL Final    Iron 05/21/2024 68  37 - 145 mcg/dL Final       EKG Results:  No orders to display       Imaging Results:  CT Head Without Contrast    Result Date: 1/16/2025  Impression: 1.No acute intracranial process identified. 2.Mild mucosal disease within left sphenoid sinus. Electronically Signed: Travis Horan MD  1/16/2025 7:00 PM EST  Workstation ID: [id number]    XR Chest PA & Lateral (In Office)    Result Date: 1/7/2025  Impression: No acute process. Electronically Signed: Rito Lambert MD  1/7/2025 3:10 PM EST  Workstation ID: [id number]        Assessment & Plan   Diagnoses and all orders for this visit:    1. Generalized anxiety disorder (Primary)  -     citalopram (CeleXA) 20 MG tablet; Take 1 tablet by mouth Every Morning.  Dispense: 90 tablet; Refill: 0    2. Recurrent major depressive disorder, in partial remission  -     citalopram (CeleXA) 20 MG tablet; Take 1 tablet by mouth Every Morning.  Dispense: 90 tablet; Refill: 0    3. Insomnia due to other mental disorder  -     traZODone (DESYREL) 100 MG tablet; Take 2 tablets by mouth At Night As Needed for Sleep.  Dispense: 180 tablet; Refill: 0    4. Alcohol dependence in remission            Patient screened positive for depression based on a PHQ-9 score of 9 on 3/6/2025. Follow-up recommendations include: Prescribed antidepressant medication treatment, Referral to Mental Health specialist, Suicide Risk Assessment performed, and see assessment below .    Dx: MDD, HELENE, insomnia, alcohol  dependence.  We will continue Celexa for management depression, anxiety, and overall mood.  We will continue trazodone 200 mg for sleep as needed.  Reiterated need for psychotherapy.  Instructed patient to contact the office for any new or worsening symptoms or any other concerns.  Follow-up in 2 months per patient request.  Addressed all questions and concerns.    I have reviewed the assessment and plan and verified the accuracy of it. No changes to assessment and plan since the information was documented. Martha Harvey PA-C 05/13/25       Visit Diagnoses:    ICD-10-CM ICD-9-CM   1. Generalized anxiety disorder  F41.1 300.02   2. Recurrent major depressive disorder, in partial remission  F33.41 296.35   3. Insomnia due to other mental disorder  F51.05 300.9    F99 327.02   4. Alcohol dependence in remission  F10.21 303.93             PLAN:  Safety: Discussed concerns about access to firearms and the need to have a trusted friend or family member hold onto her firearm until mood improves.  Patient states she will give her gun to her father.  Therapy: We will refer for psychotherapy  Risk Assessment: Risk of self-harm acutely is moderate to severe.  Risk factors include anxiety disorder, mood disorder, access to firearms, and recent psychosocial stressors (pandemic). Protective factors include no family history, no present SI, no history of suicide attempts or self-harm in the past, minimal AODA, healthcare seeking, future orientation, willingness to engage in care.  Risk of self-harm chronically is also moderate to severe, but could be further elevated in the event of treatment noncompliance and/or AODA.  Labs/Diagnostics Ordered:   No orders of the defined types were placed in this encounter.    Medications:   New Medications Ordered This Visit   Medications    citalopram (CeleXA) 20 MG tablet     Sig: Take 1 tablet by mouth Every Morning.     Dispense:  90 tablet     Refill:  0    traZODone (DESYREL) 100 MG  tablet     Sig: Take 2 tablets by mouth At Night As Needed for Sleep.     Dispense:  180 tablet     Refill:  0       Discussed all risks, benefits, alternatives, and side effects of citalopram including but not limited to GI upset, sexual dysfunction, bleeding risk, seizure risk, insomnia, sedation, headache, activation of darnell or hypomania, increased fragility fracture risk, hyponatremia, ocular effects, dose-dependent prolonged QT interval, withdrawal syndrome following abrupt discontinuation, serotonin syndrome, and activation of suicidal ideation and behavior.  Pt educated on the need to practice safe sex while taking this med. Discussed the need for pt to immediately call the office for any new or worsening symptoms, such as worsening depression; feeling nervous or restless; suicidal thoughts or actions; or other changes changes in mood or behavior, and all other concerns. Pt educated on med compliance and the risks of suddenly stopping this medication or missing doses. Pt verbalized understanding and is agreeable to taking citalopram. Addressed all questions and concerns.     Discussed all risks, benefits, alternatives, and side effects of Trazodone including but not limited to GI upset, sexual dysfunction, dizziness, headache, nervousness, bleeding risk, seizure risk, sedation, headache, activation of darnell or hypomania, increased fragility fracture risk, cardiac arrhythmias, priapism, hyponatremia, ocular effects, prolonged QT interval, withdrawal syndrome following abrupt discontinuation, serotonin syndrome, and activation of suicidal ideation and behavior.  Pt educated on the need to practice safe sex while taking this med. Discussed the need for pt to immediately call the office for any new or worsening symptoms, such as worsening depression; feeling nervous or restless; suicidal thoughts or actions; or other changes changes in mood or behavior, and all other concerns. Pt educated on med compliance and  the risks of suddenly stopping this medication or missing doses. Pt verbalized understanding and is agreeable to taking Trazodone. Addressed all questions and concerns.     Follow up: F/u in 2 months.    TREATMENT PLAN/GOALS: Continue supportive psychotherapy efforts and medications as indicated. Treatment and medication options discussed during today's visit. Patient ackowledged and verbally consented to continue with current treatment plan and was educated on the importance of compliance with treatment and follow-up appointments.    MEDICATION ISSUES:  GAUTAM reviewed as expected.  Discussed medication options and treatment plan of prescribed medication as well as the risks, benefits, and side effects including potential falls, possible impaired driving and metabolic adversities among others. Patient is agreeable to call the office with any worsening of symptoms or onset of side effects. Patient is agreeable to call 911 or go to the nearest ER should he/she begin having SI/HI. No medication side effects or related complaints today.            This document has been electronically signed by Martha Harvey PA-C  May 13, 2025 08:29 EDT      Part of this note may be an electronic transcription/translation of spoken language to printed text using the Dragon Dictation System.     Melolabial Transposition Flap Text: The defect edges were debeveled with a #15 scalpel blade.  Given the location of the defect and the proximity to free margins a melolabial flap was deemed most appropriate.  Using a sterile surgical marker, an appropriate melolabial transposition flap was drawn incorporating the defect.    The area thus outlined was incised deep to adipose tissue with a #15 scalpel blade.  The skin margins were undermined to an appropriate distance in all directions utilizing iris scissors.

## 2025-05-22 RX ORDER — CETIRIZINE HYDROCHLORIDE 10 MG/1
10 TABLET ORAL DAILY
Qty: 30 TABLET | Refills: 1 | Status: SHIPPED | OUTPATIENT
Start: 2025-05-22

## 2025-05-22 RX ORDER — CLONIDINE HYDROCHLORIDE 0.1 MG/1
0.1 TABLET ORAL NIGHTLY
Qty: 30 TABLET | Refills: 1 | Status: SHIPPED | OUTPATIENT
Start: 2025-05-22

## 2025-06-16 RX ORDER — PRAMIPEXOLE DIHYDROCHLORIDE 0.25 MG/1
0.25 TABLET ORAL NIGHTLY
Qty: 30 TABLET | Refills: 2 | Status: SHIPPED | OUTPATIENT
Start: 2025-06-16 | End: 2025-06-16

## 2025-06-16 RX ORDER — PRAMIPEXOLE DIHYDROCHLORIDE 0.5 MG/1
0.5 TABLET ORAL NIGHTLY
Qty: 30 TABLET | Refills: 1 | Status: SHIPPED | OUTPATIENT
Start: 2025-06-16

## 2025-06-16 NOTE — TELEPHONE ENCOUNTER
Caller: Frances Rao    Relationship: Self    Best call back number: [phone]    Requested Prescriptions:   Requested Prescriptions     Pending Prescriptions Disp Refills    pramipexole (MIRAPEX) 0.25 MG tablet          Pharmacy where request should be sent: [facility] [id number]  [place] - [address] AT [facility] [address] - [phone] [facility] [phone]      Last office visit with prescribing clinician: 1/20/2025   Last telemedicine visit with prescribing clinician: Visit date not found   Next office visit with prescribing clinician: Visit date not found     Additional details provided by patient: PATIENT WAS PRESCRIBED THIS MEDICATION FROM ANOTHER DOCTOR BUT WOULD LIKE [facility] TO REFILL THE MEDICATION IF POSSIBLE. SHE TAKES THIS FOR RESTLESS LEG SYNDROME.     SHE WAS PRESCRIBED TO TAKE THIS AS 1 EACH NIGHT, BUT WAS ADVISED THAT SHE CAN TAKE UP TO 4 A NIGHT IF IT BECAME MORE SEVERE. SHE HAS ONLY HAD TO TAKE 2 A NIGHT AS IT HELPS. HOWEVER, SHE PICKED UP THE SCRIPT IN MAY, 2025, AND SHE IS NOT DUE FOR REFILL UNTIL AUGUST, 2025 SINCE THE INSTRUCTIONS IS TO TAKE 1 EACH NIGHT, AND SHE IS RUNNING OUT OF THE MEDICATION QUICKER. PATIENT WOULD LIKE CALL BACK TO LET HER KNOW IF SHE CAN GET THIS FILLED FROM [facility].     Does the patient have less than a 3 day supply:  [x] Yes  [] No    Would you like a call back once the refill request has been completed: [x] Yes [] No    If the office needs to give you a call back, can they leave a voicemail: [x] Yes [] No    Slim Gordon Rep   06/16/25 10:57 EDT

## 2025-07-18 ENCOUNTER — OFFICE VISIT (OUTPATIENT)
Dept: OTOLARYNGOLOGY | Facility: CLINIC | Age: 45
End: 2025-07-18
Payer: COMMERCIAL

## 2025-07-18 VITALS
SYSTOLIC BLOOD PRESSURE: 125 MMHG | TEMPERATURE: 98 F | HEART RATE: 75 BPM | OXYGEN SATURATION: 96 % | DIASTOLIC BLOOD PRESSURE: 79 MMHG

## 2025-07-18 DIAGNOSIS — R09.81 NASAL CONGESTION: ICD-10-CM

## 2025-07-18 DIAGNOSIS — J30.9 ALLERGIC RHINITIS, UNSPECIFIED SEASONALITY, UNSPECIFIED TRIGGER: Primary | ICD-10-CM

## 2025-07-18 DIAGNOSIS — J34.3 HYPERTROPHY OF BOTH INFERIOR NASAL TURBINATES: ICD-10-CM

## 2025-07-18 RX ORDER — BUDESONIDE AND FORMOTEROL FUMARATE DIHYDRATE 160; 4.5 UG/1; UG/1
AEROSOL RESPIRATORY (INHALATION)
COMMUNITY
Start: 2025-06-13

## 2025-07-18 RX ORDER — CLONIDINE HYDROCHLORIDE 0.1 MG/1
0.1 TABLET ORAL NIGHTLY
Qty: 30 TABLET | Refills: 1 | Status: SHIPPED | OUTPATIENT
Start: 2025-07-18

## 2025-07-18 NOTE — PROGRESS NOTES
Patient Name: Frances Rao   Visit Date: 07/18/2025   Patient ID: [id number]  Provider: Toni Sanabria MD    Sex: female  Location: [facility]   YOB: 1980  Location Address: [address] [place] [address], [address] [place] [address],?[place]?[id number]    Primary Care Provider Maria Guadalupe Francisco PA-C  Location Phone: [phone]    Referring Provider: Maria Guadalupe Francisco PA-C        Chief Complaint  Nasal Congestion    History of Present Illness  Frances Rao is a 45 y.o. female who presents to [facility] today as a consult from Maria Guadalupe Francisco PA-C for evaluation of her nose.  She tells me that she has experienced difficulty with bilateral nasal congestion for a number of years.  This is occasionally associated with thick rhinorrhea.  Her symptoms are year-round.  She denies any issues with hyposmia, epistaxis, prior nasal trauma, or prior nasal surgery.  She does sleep with a fan on typically and feels as though this causes some dryness in her nose.  She is currently intermittently using fluticasone and taking cetirizine daily. CT scan of the head without contrast on 1/16/2025 revealed clear mastoids, clear middle ear's, and clear sinuses aside from very mild mucosal thickening in the left sphenoid.  Her maxillary sinuses were incompletely visualized.  She is seen by family allergy and asthma for which she is treated for allergic rhinitis and moderate persistent asthma.  Skin testing revealed positive reactivity to dust mites, cats, cockroach, mold spores, tree pollen.  She tells me that she dislikes needles and does not wish to pursue immunotherapy.      Past Medical History:   Diagnosis Date    Abnormal Pap smear of cervix     Abnormal uterine bleeding (AUB)     Allergic     Anxiety     Asthma     Depression 2012    Dizziness     Fibroid     History of sleep walking     Insomnia     Migraine     Orthostatic hypertension 10/21/2024    Ovarian cyst        Past  Surgical History:   Procedure Laterality Date    COLPOSCOPY      D & C HYSTEROSCOPY N/A 02/01/2024    Procedure: DILATATION AND CURETTAGE HYSTEROSCOPY;  Surgeon: Afshan Magana DO;  Location: [facility] MAIN OR;  Service: Gynecology;  Laterality: N/A;    HYSTERECTOMY  08/09/2024    TOTAL LAPAROSCOPIC HYSTERECTOMY N/A 08/09/2024    Procedure: ROBOTIC ASSISTED LAPAROSCOPIC HYSTERECTOMY WITH BILATERAL SALPINGECTOMY, CYTSOCOPY;  Surgeon: Afshan Magana DO;  Location: [facility] MAIN OR;  Service: Robotics - DaVinci;  Laterality: N/A;    WISDOM TOOTH EXTRACTION           Current Outpatient Medications:     Airsupra 90-80 MCG/ACT aerosol, , Disp: , Rfl:     albuterol sulfate  (90 Base) MCG/ACT inhaler, Inhale 2 puffs Every 4 (Four) Hours As Needed for Wheezing., Disp: 8 g, Rfl: 2    cetirizine (zyrTEC) 10 MG tablet, TAKE 1 TABLET BY MOUTH DAILY, Disp: 30 tablet, Rfl: 1    citalopram (CeleXA) 20 MG tablet, Take 1 tablet by mouth Every Morning., Disp: 90 tablet, Rfl: 0    cloNIDine (CATAPRES) 0.1 MG tablet, TAKE ONE TABLET BY MOUTH ONCE NIGHTLY, Disp: 30 tablet, Rfl: 1    fluticasone (FLONASE) 50 MCG/ACT nasal spray, Administer 2 sprays into the nostril(s) as directed by provider Daily., Disp: 11.1 g, Rfl: 1    pramipexole (MIRAPEX) 0.5 MG tablet, Take 1 tablet by mouth Every Night., Disp: 30 tablet, Rfl: 1    Symbicort 160-4.5 MCG/ACT inhaler, , Disp: , Rfl:     traZODone (DESYREL) 100 MG tablet, Take 2 tablets by mouth At Night As Needed for Sleep., Disp: 180 tablet, Rfl: 0     No Known Allergies    Social History     Tobacco Use    Smoking status: Every Day     Current packs/day: 0.50     Average packs/day: 0.5 packs/day for 50.9 years (25.5 ttl pk-yrs)     Types: Cigarettes     Start date: 8/12/1996    Smokeless tobacco: Never    Tobacco comments:     Last 1/31/24   Vaping Use    Vaping status: Former    Substances: Nicotine, Flavoring    Devices: Disposable   Substance Use Topics    Alcohol use: Not Currently    Drug  use: Yes     Types: Marijuana        Objective     Vital Signs:   /79   Pulse 75   Temp 98 °F (36.7 °C)   SpO2 96%       Physical Exam    General: Well developed, well nourished patient of stated age in no acute distress. Voice is strong and clear.   Head: Normocephalic and atraumatic.  Face: No lesions.  Bilateral parotid and submandibular glands are unremarkable.  Stensen's and Warthin's ducts are productive of clear saliva bilaterally.  House-Brackmann I/VI     bilaterally.   muscles and temporomandibular joint nontender to palpation.  No TMJ crepitus.  Eyes: PERRLA, sclerae anicteric, no conjunctival injection. Extraocular movements are intact and full. No nystagmus.   Ears: Auricles are normal in appearance. Bilateral external auditory canals are unremarkable. Bilateral tympanic membranes are clear and without effusion. Hearing normal to conversational voice.   Nose: External nose is normal in appearance. Bilateral nares are patent with normal appearing mucosa. Septum midline.  Bilateral inferior turbinates are hypertrophied. No lesions.   Oral Cavity: Lips are normal in appearance. Oral mucosa is unremarkable. Gingiva is unremarkable. Normal dentition for age. Tongue is unremarkable with good movement. Hard palate is unremarkable.   Oropharynx: Soft palate is unremarkable with full movement. Uvula is unremarkable. Bilateral tonsils are unremarkable. Posterior oropharynx is unremarkable.    Larynx and hypopharynx: Deferred secondary to gag reflex.  Neck: Supple.  No mass.  Nontender to palpation.  Trachea midline. Thyroid normal size and without nodules to palpation.   Lymphatic: No lymphadenopathy upon palpation.  Respiratory: Clear to auscultation bilaterally, nonlabored respirations    Cardiovascular: RRR, no murmurs, rubs, or gallops,   Psychiatric: Appropriate affect, cooperative   Neurologic: Oriented x 3, strength symmetric in all extremities, Cranial Nerves II-XII are grossly intact  to confrontation   Skin: Warm and dry. No rashes.    Procedures           Result Review :               Assessment and Plan    Diagnoses and all orders for this visit:    1. Allergic rhinitis, unspecified seasonality, unspecified trigger (Primary)    2. Hypertrophy of both inferior nasal turbinates    3. Nasal congestion    Impressions and findings were discussed at great length.  Currently, she is seen today for evaluation of bilateral nasal congestion.  She does have a history of allergic rhinitis and is currently taking cetirizine and intermittently using fluticasone.  Examination today reveals bilateral inferior turbinate hypertrophy.  We discussed the pathophysiology and natural history of this condition.  Options for management were discussed including medical management with a daily nasal steroid spray such as her fluticasone and saline irrigations versus submucous resection of the bilateral inferior nasal turbinates.  We reviewed the risks, benefits, alternatives, and expected postoperative course.  After thorough discussion she is going to try using her fluticasone consistently and will call if she desires surgery in the future.        Follow Up   Return if symptoms worsen or fail to improve.  Patient was given instructions and counseling regarding her condition or for health maintenance advice. Please see specific information pulled into the AVS if appropriate.

## 2025-08-01 RX ORDER — PRAMIPEXOLE DIHYDROCHLORIDE 0.5 MG/1
0.5 TABLET ORAL NIGHTLY
Qty: 30 TABLET | Refills: 1 | Status: SHIPPED | OUTPATIENT
Start: 2025-08-01

## (undated) DEVICE — LITHOTOMY-SLINGS: Brand: MEDLINE INDUSTRIES, INC.

## (undated) DEVICE — APPL CHLORAPREP HI/LITE 26ML ORNG

## (undated) DEVICE — DRSNG WND GZ CURAD OIL EMULSION 3X3IN STRL

## (undated) DEVICE — GLOVE,SURG,SENSICARE SLT,LF,PF,6.5: Brand: MEDLINE

## (undated) DEVICE — SOL IRRG H2O BG 3000ML STRL

## (undated) DEVICE — CVR DISP HUG U VAC STEEP TREND

## (undated) DEVICE — BLADELESS OBTURATOR: Brand: WECK VISTA

## (undated) DEVICE — GLOVE,SURG,SENSICARE SLT,LF,PF,7.5: Brand: MEDLINE

## (undated) DEVICE — ADHS SKIN SURG TISS VISC PREMIERPRO EXOFIN HI/VISC FAST/DRY

## (undated) DEVICE — ANTIBACTERIAL UNDYED BRAIDED (POLYGLACTIN 910), SYNTHETIC ABSORBABLE SUTURE: Brand: COATED VICRYL

## (undated) DEVICE — Device

## (undated) DEVICE — GLV SURG BIOGEL LTX PF 6 1/2

## (undated) DEVICE — SOL NACL 0.9PCT 1000ML

## (undated) DEVICE — TOWEL,OR,DSP,ST,BLUE,STD,4/PK,20PK/CS: Brand: MEDLINE

## (undated) DEVICE — INTENDED FOR TISSUE SEPARATION, AND OTHER PROCEDURES THAT REQUIRE A SHARP SURGICAL BLADE TO PUNCTURE OR CUT.: Brand: BARD-PARKER ® CARBON RIB-BACK BLADES

## (undated) DEVICE — GLV SURG SENSICARE PI ORTHO PF SZ7 LF STRL

## (undated) DEVICE — SEAL

## (undated) DEVICE — APPL HEMO SURG ARISTA/AH/FLEXITIP XL 38CM

## (undated) DEVICE — 1000ML,PRESSURE INFUSER W/STOPCOCK: Brand: MEDLINE

## (undated) DEVICE — SYR LL TP 10ML STRL

## (undated) DEVICE — SLV SCD KN/LEN ADJ EXPRSS BLENDED MD 1P/U

## (undated) DEVICE — TOTAL TRAY, 16FR 10ML SIL FOLEY, URN: Brand: MEDLINE

## (undated) DEVICE — GOWN,REINFORCE,POLY,SIRUS,BREATH SLV,XLG: Brand: MEDLINE

## (undated) DEVICE — TIP COVER ACCESSORY

## (undated) DEVICE — GYN LAPAROSCOPY-LF: Brand: MEDLINE INDUSTRIES, INC.

## (undated) DEVICE — LAPAROSCOPIC SMOKE EVACUATION SYSTEM ACTIVE AND PASSIVE: Brand: VALLEYLAB

## (undated) DEVICE — STERILE POLYISOPRENE POWDER-FREE SURGICAL GLOVES WITH EMOLLIENT COATING: Brand: PROTEXIS

## (undated) DEVICE — CATH URETH INTRMIT ALLPURP LTX 16F RED

## (undated) DEVICE — SKIN PREP TRAY W/CHG: Brand: MEDLINE INDUSTRIES, INC.

## (undated) DEVICE — VCARE SMALL UTERINE MANIPULATOR: Brand: VCARE SMALL

## (undated) DEVICE — ANTIBACTERIAL VIOLET BRAIDED (POLYGLACTIN 910), SYNTHETIC ABSORBABLE SUTURE: Brand: COATED VICRYL

## (undated) DEVICE — ENDOPATH XCEL BLADELESS TROCARS WITH STABILITY SLEEVES: Brand: ENDOPATH XCEL

## (undated) DEVICE — PREP TRAY WITH CHG: Brand: MEDLINE INDUSTRIES, INC.

## (undated) DEVICE — ARM DRAPE

## (undated) DEVICE — COVER,MAYO STAND,STERILE: Brand: MEDLINE